# Patient Record
Sex: MALE | Race: WHITE | NOT HISPANIC OR LATINO | Employment: UNEMPLOYED | URBAN - METROPOLITAN AREA
[De-identification: names, ages, dates, MRNs, and addresses within clinical notes are randomized per-mention and may not be internally consistent; named-entity substitution may affect disease eponyms.]

---

## 2019-11-18 ENCOUNTER — HOSPITAL ENCOUNTER (EMERGENCY)
Facility: CLINIC | Age: 59
Discharge: HOME | End: 2019-11-18
Attending: PSYCHIATRY & NEUROLOGY | Admitting: PSYCHIATRY & NEUROLOGY
Payer: COMMERCIAL

## 2019-11-18 VITALS
HEIGHT: 67 IN | SYSTOLIC BLOOD PRESSURE: 155 MMHG | RESPIRATION RATE: 18 BRPM | WEIGHT: 140 LBS | DIASTOLIC BLOOD PRESSURE: 91 MMHG | TEMPERATURE: 98.3 F | HEART RATE: 85 BPM | BODY MASS INDEX: 21.97 KG/M2 | OXYGEN SATURATION: 96 %

## 2019-11-18 DIAGNOSIS — F10.20 UNCOMPLICATED ALCOHOL DEPENDENCE (H): ICD-10-CM

## 2019-11-18 DIAGNOSIS — F43.23 ADJUSTMENT REACTION WITH ANXIETY AND DEPRESSION: ICD-10-CM

## 2019-11-18 LAB
ALCOHOL BREATH TEST: 0.07 (ref 0–0.01)
AMPHETAMINES UR QL SCN: NEGATIVE
BARBITURATES UR QL: NEGATIVE
BENZODIAZ UR QL: NEGATIVE
CANNABINOIDS UR QL SCN: NEGATIVE
COCAINE UR QL: NEGATIVE
ETHANOL UR QL SCN: POSITIVE
OPIATES UR QL SCN: NEGATIVE

## 2019-11-18 PROCEDURE — 90791 PSYCH DIAGNOSTIC EVALUATION: CPT

## 2019-11-18 PROCEDURE — 80307 DRUG TEST PRSMV CHEM ANLYZR: CPT | Performed by: FAMILY MEDICINE

## 2019-11-18 PROCEDURE — 99285 EMERGENCY DEPT VISIT HI MDM: CPT | Mod: 25 | Performed by: PSYCHIATRY & NEUROLOGY

## 2019-11-18 PROCEDURE — 80320 DRUG SCREEN QUANTALCOHOLS: CPT | Performed by: FAMILY MEDICINE

## 2019-11-18 PROCEDURE — 99284 EMERGENCY DEPT VISIT MOD MDM: CPT | Mod: Z6 | Performed by: PSYCHIATRY & NEUROLOGY

## 2019-11-18 RX ORDER — ZOLPIDEM TARTRATE 10 MG/1
10 TABLET ORAL
Status: ON HOLD | COMMUNITY
End: 2019-11-26

## 2019-11-18 ASSESSMENT — ENCOUNTER SYMPTOMS
GASTROINTESTINAL NEGATIVE: 1
RESPIRATORY NEGATIVE: 1
DECREASED CONCENTRATION: 1
HALLUCINATIONS: 0
EYES NEGATIVE: 1
MUSCULOSKELETAL NEGATIVE: 1
NEUROLOGICAL NEGATIVE: 1
APPETITE CHANGE: 1
ACTIVITY CHANGE: 1
HYPERACTIVE: 0
CARDIOVASCULAR NEGATIVE: 1

## 2019-11-18 ASSESSMENT — MIFFLIN-ST. JEOR: SCORE: 1398.67

## 2019-11-18 NOTE — ED PROVIDER NOTES
"  History     Chief Complaint   Patient presents with     Drug / Alcohol Assessment     Suicidal     HPI  Bert Aburto is a 59 year old male who is here wanting a CD assessment and detox. Patient has a long history of alcohol dependence. He felt his drinking has gotten out of control past month. He lost his job this summer. His wife is concerned. He has been seen in the ED for falls due to his drinking. He was last seen at [facility] 2 days ago. He was not interested in [facility] detox. He allegedly has been calling [facility] for bed availability but none was available. He decided to come here to the ED. He reports drinking up to 15 beers daily. He breathalized 0.06 on arrival. He reports his wife told him to say that he is feeling suicidal with hope that he will be admitted. There was no detox bed. Patient is not interested in a psych bed as he denies feeling unsafe nor suicidal. He was told to say it in order to get admitted. He is not interested in [facility] detox. He is open to getting an outpatient CD assessment.     Patient denies using other drugs. He does not exhibit psychosis nor appear under the influence nor impaired by drugs. He now would like to go home. He would like to go smoke.    Please see [facility] Crisis Assessment on 11/18/19 in Epic for further details.    PERSONAL MEDICAL HISTORY  Past Medical History:   Diagnosis Date     Cancer (H)      PAST SURGICAL HISTORY  Past Surgical History:   Procedure Laterality Date     GENITOURINARY SURGERY       FAMILY HISTORY  History reviewed. No pertinent family history.  SOCIAL HISTORY  Social History     Tobacco Use     Smoking status: Current Every Day Smoker     Packs/day: 1.50     Smokeless tobacco: Never Used   Substance Use Topics     Alcohol use: Yes     Comment: \"12 beers a day\"     MEDICATIONS  No current facility-administered medications for this encounter.      Current Outpatient Medications   Medication     zolpidem (AMBIEN) 10 MG tablet     ALLERGIES  No Known " "Allergies      I have reviewed the Medications, Allergies, Past Medical and Surgical History, and Social History in the Epic system.    Review of Systems   Constitutional: Positive for activity change and appetite change.   HENT: Negative.    Eyes: Negative.    Respiratory: Negative.    Cardiovascular: Negative.    Gastrointestinal: Negative.    Genitourinary: Negative.    Musculoskeletal: Negative.    Neurological: Negative.    Psychiatric/Behavioral: Positive for decreased concentration and suicidal ideas. Negative for hallucinations. The patient is not hyperactive.    All other systems reviewed and are negative.      Physical Exam   BP: (!) 161/82  Pulse: 68  Temp: 98  F (36.7  C)  Resp: 18  Height: 170.2 cm (5' 7\")  Weight: 63.5 kg (140 lb)  SpO2: 100 %      Physical Exam  Vitals signs and nursing note reviewed.   HENT:      Head: Normocephalic.      Nose: Nose normal.      Mouth/Throat:      Mouth: Mucous membranes are moist.   Eyes:      Pupils: Pupils are equal, round, and reactive to light.   Neck:      Musculoskeletal: Normal range of motion.   Cardiovascular:      Rate and Rhythm: Normal rate and regular rhythm.   Pulmonary:      Effort: Pulmonary effort is normal.      Breath sounds: Normal breath sounds.   Abdominal:      General: Abdomen is flat.   Musculoskeletal: Normal range of motion.   Skin:     General: Skin is warm.   Neurological:      General: No focal deficit present.      Mental Status: He is alert.   Psychiatric:         Attention and Perception: Attention and perception normal.         Mood and Affect: Mood and affect normal.         Speech: Speech normal.         Behavior: Behavior normal. Behavior is not agitated, aggressive, hyperactive or combative. Behavior is cooperative.         Thought Content: Thought content normal. Thought content is not paranoid or delusional. Thought content does not include homicidal or suicidal ideation.         Cognition and Memory: Cognition and memory " normal.         Judgment: Judgment normal.         ED Course        Procedures               Labs Ordered and Resulted from Time of ED Arrival Up to the Time of Departure from the ED   DRUG ABUSE SCREEN 6 CHEM DEP URINE ([facility]) - Abnormal; Notable for the following components:       Result Value    Ethanol Qual Urine Positive (*)     All other components within normal limits   ALCOHOL BREATH TEST POCT - Abnormal; Notable for the following components:    Alcohol Breath Test 0.069 (*)     All other components within normal limits            Assessments & Plan (with Medical Decision Making)   Patient with alcohol dependence who is here seeking detox. He also mentioned feeling suicidal as he was told that he would be ensured an admission. There is no available bed. He is not interested in a psych bed. He would like discharge to home. He plans on taking cab. He took an referral for an outpatient CD evaluation set up by [facility]. Patient can be discharged.    I have reviewed the nursing notes.    I have reviewed the findings, diagnosis, plan and need for follow up with the patient.    New Prescriptions    No medications on file       Final diagnoses:   Uncomplicated alcohol dependence (H)   Adjustment reaction with anxiety and depression       11/18/2019   [facility], [place], EMERGENCY DEPARTMENT     Juaquin Alcaraz MD  11/18/19 [id number]

## 2019-11-18 NOTE — ED NOTES
Patient arrives to [facility]. Psych Associate explains process and gives patient urine cup. Patient told about meeting with Mental Health  and Psychiatrist. Patient told about 2-5 hour time frame for complete evaluation. Patient offered fluids, nutrition, and comfort measures. Patient told about continuous video observation in room.

## 2019-11-18 NOTE — DISCHARGE INSTRUCTIONS
Please work on sobriety. Consider attending AA meetings for support  Follow-up P-referred outpatient CD evaluation and recommendations  If you are unable to stop drinking and want detox here, please continue to call Intake ([phone]) for bed availability

## 2019-11-20 ENCOUNTER — HOSPITAL ENCOUNTER (OUTPATIENT)
Dept: BEHAVIORAL HEALTH | Facility: CLINIC | Age: 59
Discharge: HOME | End: 2019-11-20
Attending: SOCIAL WORKER | Admitting: SOCIAL WORKER
Payer: COMMERCIAL

## 2019-11-20 VITALS
SYSTOLIC BLOOD PRESSURE: 134 MMHG | HEIGHT: 67 IN | DIASTOLIC BLOOD PRESSURE: 76 MMHG | WEIGHT: 140 LBS | HEART RATE: 85 BPM | BODY MASS INDEX: 21.97 KG/M2

## 2019-11-20 PROCEDURE — H0001 ALCOHOL AND/OR DRUG ASSESS: HCPCS | Mod: HF | Performed by: COUNSELOR

## 2019-11-20 ASSESSMENT — ANXIETY QUESTIONNAIRES
6. BECOMING EASILY ANNOYED OR IRRITABLE: NEARLY EVERY DAY
7. FEELING AFRAID AS IF SOMETHING AWFUL MIGHT HAPPEN: NEARLY EVERY DAY
2. NOT BEING ABLE TO STOP OR CONTROL WORRYING: NEARLY EVERY DAY
3. WORRYING TOO MUCH ABOUT DIFFERENT THINGS: NEARLY EVERY DAY
1. FEELING NERVOUS, ANXIOUS, OR ON EDGE: MORE THAN HALF THE DAYS
GAD7 TOTAL SCORE: 20
IF YOU CHECKED OFF ANY PROBLEMS ON THIS QUESTIONNAIRE, HOW DIFFICULT HAVE THESE PROBLEMS MADE IT FOR YOU TO DO YOUR WORK, TAKE CARE OF THINGS AT HOME, OR GET ALONG WITH OTHER PEOPLE: EXTREMELY DIFFICULT
4. TROUBLE RELAXING: NEARLY EVERY DAY
5. BEING SO RESTLESS THAT IT IS HARD TO SIT STILL: NEARLY EVERY DAY

## 2019-11-20 ASSESSMENT — MIFFLIN-ST. JEOR: SCORE: 1398.67

## 2019-11-20 ASSESSMENT — PAIN SCALES - GENERAL: PAINLEVEL: NO PAIN (0)

## 2019-11-20 ASSESSMENT — PATIENT HEALTH QUESTIONNAIRE - PHQ9: SUM OF ALL RESPONSES TO PHQ QUESTIONS 1-9: 21

## 2019-11-20 NOTE — PROGRESS NOTES
"Rule 25 Assessment  Background Information   1. Date of Assessment Request  2. Date of Assessment  11/20/2019 3. Date Service Authorized     4.   TIGRE Grajeda     5.  Phone Number   [phone] 6. Referent  [facility] ED 7. Assessment Site  [facility]     8. Client Name   Bert Aburto 9. Date of Birth  1960 Age  59 year old 10. Gender  male  11. PMI/ Insurance No.  [id number]   12. Client's Primary Language:  English 13. Do you require special accommodations, such as an  or assistance with written material? No   14. Current Address: [address] [place] [address]   15. Client Phone Numbers: [phone] (home) or [phone] (cell)     16. Tell me what has happened to bring you here today.    Mr. Bert Aburto presents to [facility] for an outpatient evaluation of possible chemical dependency. The reason for the CD evaluation was due to the patient stating, \"I have a reallyt bad drinking problem and I haven't been functioning the last few months. If I am awake I am drinking. And I can't eat.\"     17. Have you had other rule 25 assessments?     No    DIMENSION I - Acute Intoxication /Withdrawal Potential   1. Chemical use most recent 12 months outside a facility and other significant use history (client self-report)              X = Primary Drug Used   Age of First Use Most Recent Pattern of Use and Duration   Need enough information to show pattern (both frequency and amounts) and to show tolerance for each chemical that has a diagnosis   Date of last use and time, if needed   Withdrawal Potential? Requiring special care Method of use  (oral, smoked, snort, IV, etc)   x   Alcohol     teens HU: past 6 months.  In a 24 hour period, drinking at least 12 Leinekugel beers. He'll drink [employer] [place] Cream before he goes to sleep.    Prior to past 6 months: drinking about 6 beers a day.   11/20/19  11am  CCEILY @ 12:30pm " was 0.50 yes oral      Marijuana/  Hashish   teens HU: none  Used in high school and college. 1980 no smoke      Cocaine/Crack     No use          Meth/  Amphetamines   No use          Heroin     No use          Other Opiates/  Synthetics   '18 7/17/2018: surgery and rx oxycotin. He used as prescribed. 7/2018 no oral      Inhalants     No use          Benzodiazepines     No use          Hallucinogens     '90 Mushrooms: Used 10x in his life. '90 no oral      Barbiturates/  Sedatives/  Hypnotics '10 Ambien:  First Rx: 2010  Currently: when he tries to sleep, he takes 10-20mg. He suppose to take one 10mg tab.   11/20/19  This am he took  10mg no oral      Over-the-Counter Drugs   No use          Other     No use          Nicotine     teen Current: 1+ PPD 11/20/19 no smoke     2. Do you use greater amounts of alcohol/other drugs to feel intoxicated or achieve the desired effect?  Yes.  Or use the same amount and get less of an effect?  Yes.  Example: The patient reported having increased use and tolerance issues with alcohol.    3A. Have you ever been to detox?     No    3B. When was the first time?     The patient denied ever having a detoxification admission.    3C. How many times since then?     The patient denied ever having a detoxification admission.    3D. Date of most recent detox:     The patient denied ever having a detoxification admission.    4.  Withdrawal symptoms: Have you had any of the following withdrawal symptoms?  Past 12 months Recent (past 30 days)   Agitation  Headache  Fatigue / Extremely Tired  Vivid / Unpleasant Dreams  Irritability  Sensitivity to Noise  Diminished Appetite  Unable to Eat Agitation  Headache  Fatigue / Extremely Tired  Vivid / Unpleasant Dreams  Irritability  Sensitivity to Noise  Diminished Appetite  Unable to Eat     's Visual Observations and Symptoms: currently intoxicated. CECILY at 12:30pm was 0.050    Based on the above information, is withdrawal likely to require  attention as part of treatment participation?  Yes    Dimension I Ratings   Acute intoxication/Withdrawal potential - The placing authority must use the criteria in Dimension I to determine a client s acute intoxication and withdrawal potential.    RISK DESCRIPTIONS - Severity ratin Client can tolerate and cope with withdrawal discomfort. The client displays mild to moderate intoxication or signs and symptoms interfering with daily functioning but does not immediately endanger self or others. Client poses minimal risk of severe withdrawal.    REASONS SEVERITY WAS ASSIGNED (What about the amount of the person s use and date of most recent use and history of withdrawal problems suggests the potential of withdrawal symptoms requiring professional assistance? )     Patient reports that his last use of alcohol was today about 11am. Patient displays mild intoxication symptomology at this time.  Pt was given a breathalyzer during his evaluation and patient's CECILY was 0.05 at 12:30pm. We called [facility] detox, and no beds were available today. Pt will need detox prior to entering a residential CD treatment program.         DIMENSION II - Biomedical Complications and Conditions   1a. Do you have any current health/medical conditions?(Include any infectious diseases, allergies, or chronic or acute pain, history of chronic conditions)       Yes.   Illnesses/Medical Conditions you are receiving care for: Chronic back pain. Hx of prostrate cancer in 2018.    1b. On a scale of mild, moderate to severe please specify the severity of the patient's diabetes and/or neuropathy.    The patient denied having a history of being diagnosed with diabetes or neuropathy.    2. Do you have a health care provider? When was your most recent appointment? What concerns were identified?     The patient's PCP is Dr. Chela Brambila.  The patient's Primary Medical Clinic is [facility].  The patient's last medical appointment was ~ 1-2  week agos.  Pt was seen in the ED at  [facility] on 2019 for alcohol abuse.    3. If indicated by answers to items 1 or 2: How do you deal with these concerns? Is that working for you? If you are not receiving care for this problem, why not?      Back pain: he ignores it and he drinks to treat it.    4A. List current medication(s) including over-the-counter or herbal supplements--including pain management:     Ambien and zoloft    4B. Do you follow current medical recommendations/take medications as prescribed?     No, please explain: He takes more Ambien than prescribed.    4C. When did you last take your medication?     Zoloft: this past weekend  Ambien: this morning    4D. Do you need a referral to have a follow up with a primary care physician?    No.    5. Has a health care provider/healer ever recommended that you reduce or quit alcohol/drug use?     Yes    6. Are you pregnant?     NA, because the patient is male    7. Have you had any injuries, assaults/violence towards you, accidents, health related issues, overdose(s) or hospitalizations related to your use of alcohol or other drugs:     Yes, explain: 2018 he was walking the dog, fell, and got a concussion. ED visit on 2019 for alcohol intoxication/withdrawal/abuse.    8. Do you have any specific physical needs/accommodations? No    Dimension II Ratings   Biomedical Conditions and Complications - The placing authority must use the criteria in Dimension II to determine a client s biomedical conditions and complications.   RISK DESCRIPTIONS - Severity ratin Client tolerates and sherrie with physical discomfort and is able to get the services that the client needs.    REASONS SEVERITY WAS ASSIGNED (What physical/medical problems does this person have that would inhibit his or her ability to participate in treatment? What issues does he or she have that require assistance to address?)    Patient denies having any chronic  "biomedical conditions that would interfere with treatment or any recovery skills training/workshop. Pt reports having chronic back pain. Pt denies taking any medications for his physical health. At the time of the CD evaluation the patient's BP was 134/76 and Pulse was 85 BPM. Pt's BMI score was 21.93, placing him in the healthy weight category. Pt reports having pain at this time and reports his pain level is a 1 on the 0-10 Pain Rating Scale. Pt reports that he is a daily cigarette smoker and is not inclined to quit smoking at this time.          DIMENSION III - Emotional, Behavioral, Cognitive Conditions and Complications   1. (Optional) Tell me what it was like growing up in your family. (substance use, mental health, discipline, abuse, support)     \"big family, crowded. It was good. I enjoyed it.\" He reports there is 8 kids in the family and he is the 2nd youngest.  CD: oldest sister and brother are on lithium for bipolar. Mom was too before she passed away.  Support: yes  Abuse: no    2. When was the last time that you had significant problems...  A. with feeling very trapped, lonely, sad, blue, depressed or hopeless  about the future? Past Month- \"because of my drinking.\"    B. with sleep trouble, such as bad dreams, sleeping restlessly, or falling  asleep during the day? Past Month- related to his drinking.    C. with feeling very anxious, nervous, tense, scared, panicked, or like  something bad was going to happen? Past Month- he if often anxious.    D. with becoming very distressed and upset when something reminded  you of the past? Never    E. with thinking about ending your life or committing suicide? Past Month- \"I just feel like my life is going no where and I feel I would be better off dead. Especially the way it is now.\" Any actual thoughts of suicide? \"not how it would happen.\" Any plan? no.  Passive suicide ideation.    3. When was the last time that you did the following things two or more " "times?  A. Lied or conned to get things you wanted or to avoid having to do  something? Past Month    B. Had a hard time paying attention at school, work, or home? 2 - 12 months ago    C. Had a hard time listening to instructions at school, work, or home? Never    D. Were a bully or threatened other people? Never    E. Started physical fights with other people? Never    Note: These questions are from the Global Appraisal of Individual Needs--Short Screener. Any item marked  past month  or  2 to 12 months ago  will be scored with a severity rating of at least 2.     For each item that has occurred in the past month or past year ask follow up questions to determine how often the person has felt this way or has the behavior occurred? How recently? How has it affected their daily living? And, whether they were using or in withdrawal at the time?    See above    4A. If the person has answered item 2E with  in the past year  or  the past month , ask about frequency and history of suicide in the family or someone close and whether they were under the influence.     The patient denied any family member or someone close to the patient had ever completed suicide.    Any history of suicide in your family? Or someone close to you?     The patient denied any family member or someone close to the patient had ever completed suicide.    4B. If the person answered item 2E  in the past month  ask about  intent, plan, means and access and any other follow-up information  to determine imminent risk. Document any actions taken to intervene  on any identified imminent risk.      Past Month- \"I just feel like my life is going no where and I feel I would be better off dead. Especially the way it is now.\" Any actual thoughts of suicide? \"not how it would happen.\" Any plan? no.  Passive suicide ideation.    5A. Have you ever been diagnosed with a mental health problem?     No      5B. Are you receiving care for any mental health issues? If " "yes, what is the focus of that care or treatment?  Are you satisfied with the service? Most recent appointment?  How has it been helpful?     Yes, He has a therapist and they have worked together since this past summer.  Last saw her \"a few months ago because I can't afford it.\"    6. Have you been prescribed medications for emotional/psychological problems?     Yes, Ambien and zoloft    7. Does your MH provider know about your use?     Yes.  7B. What does he or she have to say about it?(DSM) \"the same thing, quit. You shouldn't be doing what you are doing.\"    8A. Have you ever been verbally, emotionally, physically or sexually abused?      No     Follow up questions to learn current risk, continuing emotional impact.      The patient denied having any history of being verbally, emotionally, physically or sexually abused.    8B. Have you received counseling for abuse?      The patient denied having any history of being verbally, emotionally, physically or sexually abused.    9. Have you ever experienced or been part of a group that experienced community violence, historical trauma, rape or assault?     No    10A. :    No    11. Do you have problems with any of the following things in your daily life?    Dizziness, Performing your job/school work and In relationships with others      Note: If the person has any of the above problems, follow up with items 12, 13, and 14. If none of the issues in item 11 are a problem for the person, skip to item 15.    The patient would benefit from developing sober coping skills.    12. Have you been diagnosed with traumatic brain injury or Alzheimer s?  No    13. If the answer to #12 is no, ask the following questions:    Have you ever hit your head or been hit on the head? Yes- 2018 concussion    Were you ever seen in the Emergency Room, hospital or by a doctor because of an injury to your head? Yes    Have you had any significant illness that affected your brain (brain " "tumor, meningitis, West Nile Virus, stroke or seizure, heart attack, near drowning or near suffocation)? No    14. If the answer to #12 is yes, ask if any of the problems identified in #11 occurred since the head injury or loss of oxygen. Yes, \"I was unconscious, so yeah.\"    15A. Highest grade of school completed:     Graduate/professional degree- environmental engineering     15B. Do you have a learning disability? No    15C. Did you ever have tutoring in Math or English? No    15D. Have you ever been diagnosed with Fetal Alcohol Effects or Fetal Alcohol Syndrome? No    16. If yes to item 15 B, C, or D: How has this affected your use or been affected by your use?     The patient denied having any history of a learning disability, tutoring in math or English or being diagnosed with Fetal Alcohol Effects or Fetal Alcohol Syndrome.    Dimension III Ratings   Emotional/Behavioral/Cognitive - The placing authority must use the criteria in Dimension III to determine a client s emotional, behavioral, and cognitive conditions and complications.   RISK DESCRIPTIONS - Severity ratin Client has difficulty with impulse control and lacks coping skills. Client has thoughts of suicide or harm to others without means; however, the thoughts may interfere with participation in some treatment activities. Client has difficulty functioning in significant life areas. Client has moderate symptoms of emotional, behavioral, or cognitive problems. Client is able to participate in most treatment activities.    REASONS SEVERITY WAS ASSIGNED - What current issues might with thinking, feelings or behavior pose barriers to participation in a treatment program? What coping skills or other assets does the person have to offset those issues? Are these problems that can be initially accommodated by a treatment provider? If not, what specialized skills or attributes must a provider have?    The patient denies having mental health diagnosis. Pt " "is taking Ambien more than he is prescribed and doesn't take his Zoloft consistently. Pt reports he has worked with a therapist in 2019, but hasn't seen her in a while since he couldn't afford to see her anymore. Pt denies a history of abuse. At the time of the assessment, pt's PHQ-9 score was 21 (severe depression) and his SCOTT-7 score was 20 (severe anxiety).  Pt lacks emotional and stress management skills. Pt denies SIB, SA, suicidal thoughts at this time. During pt's assessment, his [place]-Suicide Severity Rating Scale score was 1 - Low Risk.         DIMENSION IV - Readiness for Change   1. You ve told me what brought you here today. (first section) What do you think the problem really is?     The drinking and not being able to stop.     2. Tell me how things are going. Ask enough questions to determine whether the person has use related problems or assets that can be built upon in the following areas: Family/friends/relationships; Legal; Financial; Emotional; Educational; Recreational/ leisure; Vocational/employment; Living arrangements (DSM)      The patient currently lives with his wife of 30 years.  The patient denied having any concerns regarding his immediate living environment or neighborhood. The patient reported having a relationship conflict with \"my wife, my daughter, and just about anybody that knows me, mainly through my wife\" due to his ongoing alcohol abuse issues. The patient reported having a history of legal issues, including 3 DUIs, the most recent was in 2008. The patient is unemployed and he last worked 6/10/2019. The patient reported having some increased financial stress due to not working. The patient lacks a current sober peer support network.    3. What activities have you engaged in when using alcohol/other drugs that could be hazardous to you or others (i.e. driving a car/motorcycle/boat, operating machinery, unsafe sex, sharing needles for drugs or tattoos, etc     The patient " "reported having a history of driving while under the influence of alcohol or drugs.    4. How much time do you spend getting, using or getting over using alcohol or drugs? (DSM)     He drinks whenever he is awake. He will drink at least 1 beer an hour.    5. Reasons for drinking/drug use (Use the space below to record answers. It may not be necessary to ask each item.)  Like the feeling Yes   Trying to forget problems Yes   To cope with stress Yes   To relieve physical pain Yes   To cope with anxiety Yes   To cope with depression Yes   To relax or unwind Yes   Makes it easier to talk with people No   Partner encourages use No   Most friends drink or use Yes   To cope with family problems Yes   Afraid of withdrawal symptoms/to feel better No   Other (specify)  No     A. What concerns other people about your alcohol or drug use/Has anyone told you that you use too much? What did they say? (DSM)     Wife: \"because I am basically worthless.\"    B. When did you first think you had a problem with drugs or alcohol?    \"ever since I was alive.\"    6. What changes are you willing to make? What substance are you willing to stop using? How are you going to do that? Have you tried that before? What interfered with your success with that goal?      What specific changes are you willing to make? \"quit drinking. Try to anyways.\"  Why do you want to make this change? \"because it is time.\"  What are you willing to commit to in order to make this change? \"check in (to detox), get better, come home hopefully, and stop.\"  What is getting in your way to make this change? Nothing. \"Just this process\" of getting into CD treatment.  What is the cost if you don't make this change? \"my marriage. My house.\"  On a scale 0-10, how confident are you in making this change? 8  Why are you a 8 and not a 6? \"because I am not sure what it really means.\"    7. What would be helpful to you in making this change?     \"AA for one. Getting alcohol out of " "my system.\" He wants to attend detox.    Dimension IV Ratings   Readiness for Change - The placing authority must use the criteria in Dimension IV to determine a client s readiness for change.   RISK DESCRIPTIONS - Severity ratin Client displays verbal compliance, but lacks consistent behaviors; has low motivation for change; and is passively involved in treatment.    REASONS SEVERITY WAS ASSIGNED - (What information did the person provide that supports your assessment of his or her readiness to change? How aware is the person of problems caused by continued use? How willing is she or he to make changes? What does the person feel would be helpful? What has the person been able to do without help?)      Patient admits he has had a problem with alcohol \"ever since I was alive.\"  The patient's motivation for CD treatment appears to external, as an attempt to save his marriage.  Pt drank an hour prior to this CD evaluation. Pt is willing to go to detox followed by inpatient CD treatment.  Pt appears to be in the \"contemplation\" Stage within the Stages of Change Model.          DIMENSION V - Relapse, Continued Use, and Continued Problem Potential   1A. In what ways have you tried to control, cut-down or quit your use? If you have had periods of sobriety, how did you accomplish that? What was helpful? What happened to prevent you from continuing your sobriety? (DSM)     Control: He hasn't tried to control his use of alcohol.  Sober time: when his daughter was born.    1B. What were the circumstances of your most recent relapse with mood altering chemicals?    none    2. Have you experienced cravings? If yes, ask follow up questions to determine if the person recognizes triggers and if the person has had any success in dealing with them.     In the past 30 days, on a scale of 0-10 (0 least severe to 10 being most severe, how would you rate your cravings?  10    3. Have you been treated for alcohol/other drug " "abuse/dependence? No    4. Support group participation: Have you/do you attend support group meetings to reduce/stop your alcohol/drug use? How recently? What was your experience? Are you willing to restart? If the person has not participated, is he or she willing?     \"I have been to AA once.\"    5. What would assist you in staying sober/straight?     \"AA for one. Getting alcohol out of my system.\" He wants to attend detox.    Dimension V Ratings   Relapse/Continued Use/Continued problem potential - The placing authority must use the criteria in Dimension V to determine a client s relapse, continued use, and continued problem potential.   RISK DESCRIPTIONS - Severity ratin No awareness of the negative impact of mental health problems or substance abuse. No coping skills to arrest mental health or addiction illnesses, or prevent relapse.    REASONS SEVERITY WAS ASSIGNED - (What information did the person provide that indicates his or her understanding of relapse issues? What about the person s experience indicates how prone he or she is to relapse? What coping skills does the person have that decrease relapse potential?)      Pt denies having ever attended a cd treatment program, a detox program, or a 12 step meeting before. Pt lacks education into his disease of addiction. In the past 30 days, pt rates his cravings for alcohol as a 10 on the 0-10 Craving Scale.  Pt lacks impulse control and long-term sober maintenance skills. Pt lacks insight into the effects his use has had on his physical and mental health.  Pt is at a high risk for continued use.         DIMENSION VI - Recovery Environment   1. Are you employed/attending school? Tell me about that.     Pt is no longer working. His last day of work was 6/10/2019.  How has your use impacted your job? He reports it hasn't.  When is the last time you called in to work due to being too hungover/impaired to go to work? He hasn't    2A. Describe a typical day; " "evening for you. Work, school, social, leisure, volunteer, spiritual practices. Include time spent obtaining, using, recovering from drugs or alcohol. (DSM)     Current: \"I get up around, various times. I might wake up... I go to bed kind of early. I might up at 2, I might wake up at 8. Every time I get up I have a drink. My wife gets up around 10 or 11, and she immediately calls me and it goes down hill from there.\"    Please describe what leisure activities have been associated with your substance abuse:     The patient denied having any leisure activities which had been associated with his substance abuse.    2B. How often do you spend more time than you planned using or use more than you planned? (DSM)     \"every day.\"    3. How important is using to your social connections? Do many of your family or friends use?     Most of his friends drink.    4A. Are you currently in a significant relationship?     Yes.  4B. How long?  30 years            Please describe your significant other's use of mood altering chemicals? She drinks when she comes home from work.    4C. Sexual Orientation:     Heterosexual    5A. Who do you live with?      His wife    5B. Tell me about their alcohol/drug use and mental health issues.     She drinks when she comes home from work to relax.    5C. Are you concerned for your safety there? No    5D. Are you concerned about the safety of anyone else who lives with you? No    6A. Do you have children who live with you?     No    6B. Do you have children who do not live with you?     Yes- one adult daughter.    7A. Who supports you in making changes in your alcohol or drug use? What are they willing to do to support you? Who is upset or angry about you making changes in your alcohol or drug use? How big a problem is this for you?      \"wife and daughter.\"    7B. This table is provided to record information about the person s relationships and available support It is not necessary to ask " each item; only to get a comprehensive picture of their support system.  How often can you count on the following people when you need someone?   Partner / Spouse Always supportive   Parent(s)/Aunt(s)/Uncle(s)/Grandparents The patient's parents and other family members are .   Sibling(s)/Cousin(s) Usually supportive   Child(negro) Always supportive   Other relative(s) Usually supportive   Friend(s)/neighbor(s) Usually supportive  The patient doesn't have any other current friends.   Child(negro) s father(s)/mother(s) Always supportive   Support group member(s) The patient denied having any current involvement with 12-step or other support group meetings.   Community of campbell members The patient denied having any current involvement with community campbell members.   /counselor/therapist/healer Always supportive   Other (specify) No     8A. What is your current living situation?     He owns a house with his wife.    8B. What is your long term plan for where you will be living?     No changes. He is talking about selling his house.     8C. Tell me about your living environment/neighborhood? Ask enough follow up questions to determine safety, criminal activity, availability of alcohol and drugs, supportive or antagonistic to the person making changes.      No concerns.    9. Criminal justice history: Gather current/recent history and any significant history related to substance use--Arrests? Convictions? Circumstances? Alcohol or drug involvement? Sentences? Still on probation or parole? Expectations of the court? Current court order? Any sex offenses - lifetime? What level? (DSM)    3 DUIs. Most recent one was in .    10. What obstacles exist to participating in treatment? (Time off work, childcare, funding, transportation, pending snf time, living situation)     The patient denied having any obstacles for participating in substance abuse treatment.    Dimension VI Ratings   Recovery environment -  "The placing authority must use the criteria in Dimension VI to determine a client s recovery environment.   RISK DESCRIPTIONS - Severity rating: 3 Client is not engaged in structured, meaningful activity and the client's peers, family, significant other, and living environment are unsupportive, or there is significant criminal justice system involvement.    REASONS SEVERITY WAS ASSIGNED - (What support does the person have for making changes? What structure/stability does the person have in his or her daily life that will increase the likelihood that changes can be sustained? What problems exist in the person s environment that will jeopardize getting/staying clean and sober?)     The patient currently lives with his wife of 30 years.  The patient denied having any concerns regarding his immediate living environment or neighborhood. The patient reported having a relationship conflict with \"my wife, my daughter, and just about anybody that knows me, mainly through my wife\" due to his ongoing alcohol abuse issues. The patient reported having a history of legal issues, including 3 DUIs, the most recent was in 2008. The patient is unemployed and he last worked 6/10/2019. The patient reported having some increased financial stress due to not working. The patient lacks a current sober peer support network.         Client Choice/Exceptions   Would you like services specific to language, age, gender, culture, Mormon preference, race, ethnicity, sexual orientation or disability?  No    What particular treatment choices and options would you like to have? inpatient    Do you have a preference for a particular treatment program? None    Criteria for Diagnosis     Criteria for Diagnosis  DSM-5 Criteria for Substance Use Disorder  Instructions: Determine whether the client currently meets the criteria for Substance Use Disorder using the diagnostic criteria in the DSM-V pp.[phone]. Current means during the most recent 12 " months outside a facility that controls access to substances    Category of Substance Severity (ICD-10 Code / DSM 5 Code)     Alcohol Use Disorder Severe  (10.20) (303.90)   Cannabis Use Disorder The patient does not meet the criteria for a Cannabis use disorder.   Hallucinogen Use Disorder The patient does not meet the criteria for a Hallucinogen use disorder.   Inhalant Use Disorder The patient does not meet the criteria for an Inhalant use disorder.   Opioid Use Disorder The patient does not meet the criteria for an Opioid use disorder.   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   Tobacco Use Disorder Moderate   (F17.200) (305.1)   Other (or unknown) Substance Use Disorder The patient does not meet the criteria for a Other (or unknown) Substance use disorder.       Collateral Contact Summary   Number of contacts made: 2    Contact with referring person:  No    If court related records were reviewed, summarize here: No court records had been reviewed at the time of this documentation.    Information from collateral contacts supported/largely agreed with information from the client and associated risk ratings.      Rule 25 Assessment Summary and Plan   's Recommendation    1)  Complete a medical detox program or a similar detox program followed by a residential treatment program. Some examples are: [facility], [facility], [facility], or [facility].   2)  Abstain from all mood-altering chemicals unless prescribed by a licensed provider.   3)  Attend, at minimum, 2 weekly support group meetings, such as 12 step based (AA/NA), SMART Recovery, [facility], and/or [facility] meetings.     4)  Actively work with a male mentor/sponsor on a weekly basis.   5)  Follow all the recommendations of your treatment/medical  providers.  6)  Begin working with your therapist again.      Collateral Contacts     Name:    [id number]   Relationship:    [facility]   Phone Number:    NA Releases:    NA     The patient's medical record at [facility] was reviewed and the information contained in the medical record supported the patient's account of his chemical use history and chemical use consequences.    Per ED note on 11/18/2019 by Dr. Alcaraz:  Chief Complaint   Patient presents with     Drug / Alcohol Assessment     Suicidal      HPI  Bert A Deady is a 59 year old male who is here wanting a CD assessment and detox. Patient has a long history of alcohol dependence. He felt his drinking has gotten out of control past month. He lost his job this summer. His wife is concerned. He has been seen in the ED for falls due to his drinking. He was last seen at [facility] 2 days ago. He was not interested in [facility] detox. He allegedly has been calling [facility] for bed availability but none was available. He decided to come here to the ED. He reports drinking up to 15 beers daily. He breathalized 0.06 on arrival. He reports his wife told him to say that he is feeling suicidal with hope that he will be admitted. There was no detox bed. Patient is not interested in a psych bed as he denies feeling unsafe nor suicidal. He was told to say it in order to get admitted. He is not interested in [facility] detox. He is open to getting an outpatient CD assessment.      Patient denies using other drugs. He does not exhibit psychosis nor appear under the influence nor impaired by drugs. He now would like to go home. He would like to go smoke.    Per Teresa Church on 6/12/2008:  HISTORY OF ALCOHOL AND DRUG USE:  He began using alcohol at age 14.  His heaviest use was in his college years, he was using 3-4 times per week, 3-4 beers.  In the last 6 months he quit drinking after he received a DWI.  His last reported use was 11/16/2007 or 11/17/2007.  He smoked  marijuana at  age 15.  His heaviest use was in high school and early college.  He was smoking 2-3 times per week.  He has not used since the 1980s.  He used cocaine at age 20 1-2 times.  He used hallucinogens at age 20.  He has not done that since college.  He started smoking cigarettes at age 15 and he   continues to smoke.  SUMMARY OF CHEMICAL DEPENDENCY SYMPTOMS:  He does not think he has a problem with chemicals.  He used to drink beer.  He has reduced his use within the last 6 months.  He has had an increase in tolerance and needed more to get the same high.  He has attempted to stop his use with the aid of AA.  He has changed his pattern of use.  He uses 1-2 hours a day when he does drink.  He admits to having had hangovers.  He failed to meet parental responsibilities and other responsibilities at home.  He has had 2   DWIs, one in 2004 and one in 2007.  He spent more money than he could afford.  He embarrassed and emotionally hurt his family.  He was confronted about his using behavior.  He currently lives with his wife and child.    Collateral Contacts     Name:    Corina Brenner   Relationship:    Wife   Phone Number:    [phone] cell  [phone] work   Releases:    Yes     Corina reports pt's drinking is getting worse. She reports he drinks 18-20 beers per day, whenever he is awake. He has drank almost daily for 40+ years. He has 4 DUIs and has never thought he had a problem with alcohol before. She stated she took pt's car keeps so he doesn't drive. He uses 4 tabs of Ambien a day, along with alcohol, to help him sleep. She stated pt spent a lot of time in bed.  He is very depressed and has low energy. She is worried that pt will complete suicide if things don't improve. Pt isn't eating and when he does eat, he throws up. He is unable to keep food down. She is concerned about brain damage caused by pt's drinking and not eating. She stated this is the most willing pt has ever been about not  drinking before.   ollateral Contacts      A problematic pattern of alcohol/drug use leading to clinically significant impairment or distress, as manifested by at least two of the following, occurring within a 12-month period:    1.) Alcohol/drug is often taken in larger amounts or over a longer period than was intended.  2.) There is a persistent desire or unsuccessful efforts to cut down or control alcohol/drug use  3.) A great deal of time is spent in activities necessary to obtain alcohol, use alcohol, or recover from its effects.  4.) Craving, or a strong desire or urge to use alcohol/drug  5.) Recurrent alcohol/drug use resulting in a failure to fulfill major role obligations at work, school or home.  6.) Continued alcohol use despite having persistent or recurrent social or interpersonal problems caused or exacerbated by the effects of alcohol/drug.  7.) Important social, occupational, or recreational activities are given up or reduced because of alcohol/drug use.  8.) Recurrent alcohol/drug use in situations in which it is physically hazardous.  9.) Alcohol/drug use is continued despite knowledge of having a persistent or recurrent physical or psychological problem that is likely to have been caused or exacerbated by alcohol.  10.) Tolerance, as defined by either of the following: A need for markedly increased amounts of alcohol/drug to achieve intoxication or desired effect.  11.) Withdrawal, as manifested by either of the following: The characteristic withdrawal syndrome for alcohol/drug (refer to Criteria A and B of the criteria set for alcohol/drug withdrawal).      Specify if: In early remission:  After full criteria for alcohol/drug use disorder were previously met, none of the criteria for alcohol/drug use disorder have been met for at least 3 months but for less than 12 months (with the exception that Criterion A4,  Craving or a strong desire or urge to use alcohol/drug  may be met).     In sustained  remission:   After full criteria for alcohol use disorder were previously met, non of the criteria for alcohol/drug use disorder have been met at any time during a period of 12 months or longer (with the exception that Criterion A4,  Craving or strong desire or urge to use alcohol/drug  may be met).   Specify if:   This additional specifier is used if the individual is in an environment where access to alcohol is restricted.    Mild: Presence of 2-3 symptoms  Moderate: Presence of 4-5 symptoms  Severe: Presence of 6 or more symptoms

## 2019-11-20 NOTE — PROGRESS NOTES
"[facility]  [address] [place] [address]        ADULT CD ASSESSMENT ADDENDUM      Patient Name: Bert Aburto  Cell Phone: [phone]  Home: [phone]   Email:  [email or web address]  Emergency Contact: Corina Brenner (wife) Tel: [phone]    The patient reported being:      With which race do you identify? White    Initial Screening Questions     1. Are you currently having severe withdrawal symptoms that are putting yourself or others in danger?  No    2. Are you currently having severe medical problems that require immediate attention?  No    3. Are you currently having severe emotional or behavioral problems that are putting yourself or others at risk of harm?  No    4. Do you have sufficient reading skills that will enable you to understand written materials, including the program rules and client rights materials?  Yes     Family History and other additional information     Who raised you? (parents, grandparents, adoptive parents, step-parents, etc.)    Both Parents    Please tell me what it was like growing up in your family. (please include any history of substance abuse, mental health issues, emotional/physical/sexual abuse, forms of discipline, and support)     \"big family, crowded. It was good. I enjoyed it.\" He reports there is 8 kids in the family and he is the 2nd youngest.  CD: oldest sister and brother are on lithium for bipolar. Mom was too before she passed away.  Support: yes  Abuse: no     Do you have any children or Stepchildren? Yes, explain: 1 26 y/o daughter    Are you being investigated by Child Protection Services? No    Do you have a child protection worker, probation office or ?  No    How would you describe your current finances?  Some money problems    If you are having problems, (unpaid bills, bankruptcy, IRS problems) please explain:  No    If working or a student are you able to function appropriately in that setting? No, explain: due " "to his drinking.     Describe your preferred learning style:  by reading    What are your some of your personal strengths?  \"I used to be pretty smart in engineering.\"    Do you currently participate in community campbell activities, such as attending Mosque, temple, Alevism or Baptist services?  No    How does your spirituality impact your recovery?  \"it doesn't.\"    Do you currently self-administer your medications?  Yes    Have you ever had to lie to people important to you about how much you smith?   No   Have you ever felt the need to bet more and more money?   No   Have you ever attempted treatment for a gambling problem?   No   Have you ever touched or fondled someone else inappropriately or forced them to have sex with you against their will?   No   Are you or have you ever been a registered sex offender?   No   Is there any history of sexual abuse in your family? No   Have you ever felt obsessed by your sexual behavior, such as having sex with many partners, masturbating often, using pornography often?   No     Have you ever received therapy or stayed in the hospital for mental health problems?   Yes, explain: currently in therapy. No MH hospitalizations.     Have you ever hurt yourself, such as cutting, burning or hitting yourself?   No     Have you ever purged, binged or restricted yourself as a way to control your weight?   No     Are you on a special diet?   No     Do you have any concerns regarding your nutritional status?   No     Have you had any appetite changes in the last 3 months?   No   Have you had weight loss or weight gain of more than 10 lbs in the last 3 months?   If patient gained or lost more than 10 lbs, then refer to program RN / attending Physician for assessment.   Yes, explain: lost weight   Was the patient informed of BMI?    Normal, No Intervention   Yes   Have you engaged in any risk-taking behavior that would put you at risk for exposure to blood-borne or sexually transmitted " diseases?   No   Do you have any dental problems?   No   Have you ever lived through any trauma or stressful life events?   No   In the past month, have you had any of the following symptoms related to the trauma listed above? (dreams, intense memories, flashbacks, physical reactions, etc.)   No   Have you ever believed people were spying on you, or that someone was plotting against you or trying to hurt you?   No   Have you ever believed someone was reading your mind or could hear your thoughts or that you could actually read someone's mind or hear what another person was thinking?   No   Have you ever believed that someone of some force outside of yourself was putting thoughts into your mind or made you act in a way that was not your usual self?  Have you ever though you were possessed?   No   Have you ever believed you were being sent special messages through the TV, radio or newspaper?   No   Have you ever heard things other people couldn't hear, such as voices or other noises?   No   Have you ever had visions when you were awake?  Or have you ever seen things other people couldn't see?   No   Do you have a valid 's license?    Yes     PHQ-9, SCOTT-7 and Suicide Risk Assessment   PHQ-9 on 11/20/2019 SCOTT-7 on 11/20/2019   The patient's PHQ-9 score was 21 out of 27, indicating severe depression.   The patient's SCOTT-7 score was 20 out of 21, indicating severe anxiety.       [place]-Suicide Severity Rating Scale   Suicide Ideation   1.) Have you ever wished you were dead or that you could go to sleep and not wake up?     Lifetime:  Yes   Past Month:  Yes     2.) Have you actually had any thoughts of killing yourself?   Lifetime:  No   Past Month:  No     3.) Have you been thinking about how you might do this?     Lifetime:  No   Past Month:  No     4.) Have you had these thoughts and had some intention of acting on them?     Lifetime:  No   Past Month:  No     5.) Have you started to work out the details of how  to kill yourself?   Lifetime:  No   Past Month:  No     6.) Do you intend to carry out this plan?      Lifetime:  No   Past Month:  No     Intensity of Ideation   Intensity of ideation (1 being least severe, 5 being most severe):     Lifetime:  The patient denied ever having any suicidal thoughts in life.   Past Month:  4     How often do you have these thoughts?  Daily or on almost daily basis     When you have the thoughts how long do they last?  Fleeting - few seconds or minutes     Can you stop thinking about killing yourself or wanting to die if you want to?  Yes, easily able to control thoughts     Are there things - anyone or anything (i.e. family, Rastafari, pain of death) that stopped you from wanting to die or acting on thoughts of suicide?  Protective factors definitely stopped you from attempting suicide     What sort of reasons did you have for thinking about wanting to die or killing yourself (ie end pain, stop how you were feeling, get attention or reaction, revenge)?  Mostly to end or stop the pain (you couldn't go on living the way you were feeling)     Suicidal Behavior   (Suicide Attempt) - Have you made a suicide attempt?     Lifetime:  The patient had never made a suicide attempt.   Past Month:  The patient had never made a suicide attempt.     Have you engaged in self-harm (non-suicidal self-injury)?  The patient denied having any history of engaging in self-harm (non-suicidal self-injury).     (Interrupted Attempt) - Has there been a time when you started to do something to end your life but someone or something stopped you before you actually did anything?  The patient denied having any history of an interrupted suicide attempt.     (Aborted or Self-Interrupted Attempt) - Has there been a time when you started to do something to try to end your life but you stopped yourself before you actually did anything?  The patient denied having any history of an aborted or self-interrupted suicide  attempt.     (Preparatory Acts of Behavior) - Have you taken any steps towards making suicide attempt or preparing to kill yourself (such as collecting pills, getting a gun, giving valuables away or writing a suicide note)?  The patient denied having any history of taking any steps towards making a suicide attempt or preparing to kill self.     Actual Lethality/Medical Damage:  The patient denied ever making a suicidal attempt.       2008  [facility]  Used with permission by Marycruz Morris, PhD.       Guide to C-SSRS Risk Ratings   NO IDEATION:  with no active thoughts IDEATION: with a wish to die. IDEATION: with active thoughts. Risk Ratings   If Yes No No 0 - Very Low Risk   If NA Yes No 1 - Low Risk   If NA Yes Yes 2 - Low/moderate risk   IDEATION: associated thoughts of methods without intent or plan INTENT: Intent to follow through on suicide PLAN: Plan to follow through on suicide Risk Ratings cont...   If Yes No No 3 - Moderate Risk   If Yes Yes No 4 - High Risk   If Yes Yes Yes 5 - High Risk   The patient's ADDITIONAL RISK FACTORS and lack of PROTECTIVE FACTORS may increase their overall suicide risk ratings.     Additional Risk Factors:    Significant history of having untreated or poorly treated mental health symptoms     A recent loss that was significant to the patient, i.e. loss of job, loss of home, divorce, break-up, etc.     A triggering event(s) leading to humiliation, shame or despair   Protective Factors:    Having people in his/her life that would prevent the patient from considering a suicide attempt (i.e. young children, spouse, parents, etc.)     Having pet(s) that give companionship and/or would prevent the patient from considering a suicide attempt     A positive relationship with his/her clinical medical and/or mental health providers     Having easy access to supportive family members     Risk Status   Past month: 1. - Low Risk: Evaluation Counselors:   "Document in Epic / SBAR to counselor \"Low Risk\".      Treatment Counselors:  Reassess upon admission as applicable, assess weekly in progress notes under Dimension 3 and summarize in Discharge / Treatment summary under Dimension 3.    Past 24 hours: 0. - Very Low Risk:  Evaluation Counselors:  Document in Epic / SBAR to counselor \"Very Low Risk\".      Treatment Counselors:  Reassess upon admission as applicable, assess weekly in progress notes under Dimension 3 and summarize in Discharge / Treatment summary under Dimension 3.   Additional information to support suicide risk rating: There was no additional information to provide at this time.     Mental Health Status   Physical Appearance/Attire: Appears stated age   Hygiene: well groomed   Eye Contact: at examiner   Speech Rate:  regular   Speech Volume: regular   Speech Quality: fluid   Cognitive/Perceptual:  reality based   Cognition: memory intact    Judgment: intact   Insight: intact   Orientation:  time, place, person and situation   Thought: logical    Hallucinations:  none   General Behavioral Tone: cooperative   Psychomotor Activity: no problem noted   Gait:  no problem   Mood: depressed   Affect: blunted/restricted   Counselor Notes: pt's CECILY was 0.05 @ 12:30pm     Criteria for Diagnosis: DSM-5 Criteria for Substance Use Disorders      Alcohol Use Disorder Severe - 303.90 (F10.20)  Tobacco Use Disorder Moderate - 305.10 (F17.200)    Level of Care   I.) Intoxication and Withdrawal: 1   II.) Biomedical:  1   III.) Emotional and Behavioral:  2   IV.) Readiness to Change:  2   V.) Relapse Potential: 4   VI.) Recovery Environmental: 3     Initial Problem List     The patient lacks relapse prevention skills  The patient has poor coping skills  The patient has poor refusal skills   The patient lacks a sober peer support network  The patient has a tendency to isolate  The patient has dual issues of MI and CD  The patient lacks the ability to effectively manage " his/her mental health issues  The patient has a significant history of guilt and shame issues    Patient/Client is willing to follow treatment recommendations.  Yes    Counselor: Sushma Rodriguez [facility]    Vulnerable Adult Checklist for LODGING:     This LODGING patient, or other Residential/Lodging CD Treatment patient is a categorical Vulnerable Adult according to [place] Statute 626.5572 subdivision 21.    Susceptibility to abuse by others     1.  Have you ever been emotionally abused by anyone?          No    2.  Have you ever been bullied, or physically assaulted by anyone?        No    3.  Have you ever been sexually taken advantage of or sexually assaulted?        No    4.  Have you ever been financially taken advantage of?        No    5.  Have you ever hurt yourself intentionally such as burns or cuts?       No    Risk of abusing other vulnerable adults     1.  Have you ever bullied, berated or emotionally degraded someone else?       No    2.  Have you ever financially taken advantage of someone else?       No    3.  Have you ever sexually exploited or assaulted another person?       No    4.  Have you ever gotten into fights, verbal arguments or physically assaulted someone?          No    Based on the above information:    This Lodging Plus patient, or other Residential/Lodging CD Treatment patient is a categorical Vulnerable Adult according to [place] Statue 626.5572 subdivision 21.                                                                                                                                                                                                       This person has a history of abuse, but is assessed as stable and not in need of an individual abuse prevention plan beyond the program abuse prevention plan.

## 2019-11-21 ENCOUNTER — HOSPITAL ENCOUNTER (INPATIENT)
Facility: CLINIC | Age: 59
LOS: 5 days | Discharge: HOME | DRG: 897 | End: 2019-11-26
Attending: EMERGENCY MEDICINE | Admitting: PSYCHIATRY & NEUROLOGY
Payer: COMMERCIAL

## 2019-11-21 DIAGNOSIS — F10.10 ALCOHOL ABUSE: Primary | ICD-10-CM

## 2019-11-21 DIAGNOSIS — F51.04 PSYCHOPHYSIOLOGICAL INSOMNIA: ICD-10-CM

## 2019-11-21 DIAGNOSIS — I10 ESSENTIAL HYPERTENSION: ICD-10-CM

## 2019-11-21 PROBLEM — F10.939 ALCOHOL WITHDRAWAL WITH COMPLICATION WITH INPATIENT TREATMENT, WITH UNSPECIFIED COMPLICATION (H): Status: ACTIVE | Noted: 2019-11-21

## 2019-11-21 LAB
ALBUMIN SERPL-MCNC: 3.7 G/DL (ref 3.4–5)
ALCOHOL BREATH TEST: 0 (ref 0–0.01)
ALP SERPL-CCNC: 120 U/L (ref 40–150)
ALT SERPL W P-5'-P-CCNC: 55 U/L (ref 0–70)
AMPHETAMINES UR QL SCN: NEGATIVE
ANION GAP SERPL CALCULATED.3IONS-SCNC: 8 MMOL/L (ref 3–14)
AST SERPL W P-5'-P-CCNC: 51 U/L (ref 0–45)
BARBITURATES UR QL: NEGATIVE
BASOPHILS # BLD AUTO: 0.1 10E9/L (ref 0–0.2)
BASOPHILS NFR BLD AUTO: 0.5 %
BENZODIAZ UR QL: NEGATIVE
BILIRUB SERPL-MCNC: 0.3 MG/DL (ref 0.2–1.3)
BUN SERPL-MCNC: 7 MG/DL (ref 7–30)
CALCIUM SERPL-MCNC: 9.5 MG/DL (ref 8.5–10.1)
CANNABINOIDS UR QL SCN: NEGATIVE
CHLORIDE SERPL-SCNC: 98 MMOL/L (ref 94–109)
CO2 SERPL-SCNC: 26 MMOL/L (ref 20–32)
COCAINE UR QL: NEGATIVE
CREAT SERPL-MCNC: 0.64 MG/DL (ref 0.66–1.25)
DIFFERENTIAL METHOD BLD: NORMAL
EOSINOPHIL # BLD AUTO: 0.1 10E9/L (ref 0–0.7)
EOSINOPHIL NFR BLD AUTO: 1 %
ERYTHROCYTE [DISTWIDTH] IN BLOOD BY AUTOMATED COUNT: 12 % (ref 10–15)
ETHANOL UR QL SCN: NEGATIVE
GFR SERPL CREATININE-BSD FRML MDRD: >90 ML/MIN/{1.73_M2}
GLUCOSE SERPL-MCNC: 100 MG/DL (ref 70–99)
HCT VFR BLD AUTO: 44.9 % (ref 40–53)
HGB BLD-MCNC: 15.7 G/DL (ref 13.3–17.7)
IMM GRANULOCYTES # BLD: 0.1 10E9/L (ref 0–0.4)
IMM GRANULOCYTES NFR BLD: 0.8 %
LYMPHOCYTES # BLD AUTO: 1.5 10E9/L (ref 0.8–5.3)
LYMPHOCYTES NFR BLD AUTO: 15.3 %
MCH RBC QN AUTO: 32.9 PG (ref 26.5–33)
MCHC RBC AUTO-ENTMCNC: 35 G/DL (ref 31.5–36.5)
MCV RBC AUTO: 94 FL (ref 78–100)
MONOCYTES # BLD AUTO: 1.1 10E9/L (ref 0–1.3)
MONOCYTES NFR BLD AUTO: 10.9 %
NEUTROPHILS # BLD AUTO: 6.9 10E9/L (ref 1.6–8.3)
NEUTROPHILS NFR BLD AUTO: 71.5 %
NRBC # BLD AUTO: 0 10*3/UL
NRBC BLD AUTO-RTO: 0 /100
OPIATES UR QL SCN: NEGATIVE
PLATELET # BLD AUTO: 405 10E9/L (ref 150–450)
POTASSIUM SERPL-SCNC: 4.8 MMOL/L (ref 3.4–5.3)
PROT SERPL-MCNC: 7.8 G/DL (ref 6.8–8.8)
RBC # BLD AUTO: 4.77 10E12/L (ref 4.4–5.9)
SODIUM SERPL-SCNC: 132 MMOL/L (ref 133–144)
WBC # BLD AUTO: 9.7 10E9/L (ref 4–11)

## 2019-11-21 PROCEDURE — 82075 ASSAY OF BREATH ETHANOL: CPT | Performed by: EMERGENCY MEDICINE

## 2019-11-21 PROCEDURE — 25000132 ZZH RX MED GY IP 250 OP 250 PS 637: Performed by: PSYCHIATRY & NEUROLOGY

## 2019-11-21 PROCEDURE — 99252 IP/OBS CONSLTJ NEW/EST SF 35: CPT | Performed by: PHYSICIAN ASSISTANT

## 2019-11-21 PROCEDURE — HZ2ZZZZ DETOXIFICATION SERVICES FOR SUBSTANCE ABUSE TREATMENT: ICD-10-PCS | Performed by: PSYCHIATRY & NEUROLOGY

## 2019-11-21 PROCEDURE — 99207 ZZC CDG-MDM COMPONENT: MEETS MODERATE - UP CODED: CPT | Performed by: PHYSICIAN ASSISTANT

## 2019-11-21 PROCEDURE — 25000132 ZZH RX MED GY IP 250 OP 250 PS 637: Performed by: EMERGENCY MEDICINE

## 2019-11-21 PROCEDURE — 99283 EMERGENCY DEPT VISIT LOW MDM: CPT | Performed by: EMERGENCY MEDICINE

## 2019-11-21 PROCEDURE — 80053 COMPREHEN METABOLIC PANEL: CPT | Performed by: EMERGENCY MEDICINE

## 2019-11-21 PROCEDURE — 80307 DRUG TEST PRSMV CHEM ANLYZR: CPT | Performed by: EMERGENCY MEDICINE

## 2019-11-21 PROCEDURE — 99284 EMERGENCY DEPT VISIT MOD MDM: CPT | Mod: Z6 | Performed by: EMERGENCY MEDICINE

## 2019-11-21 PROCEDURE — 12800008 ZZH R&B CD ADULT

## 2019-11-21 PROCEDURE — 85025 COMPLETE CBC W/AUTO DIFF WBC: CPT | Performed by: EMERGENCY MEDICINE

## 2019-11-21 PROCEDURE — 80320 DRUG SCREEN QUANTALCOHOLS: CPT | Performed by: EMERGENCY MEDICINE

## 2019-11-21 RX ORDER — ONDANSETRON 4 MG/1
4 TABLET, ORALLY DISINTEGRATING ORAL EVERY 6 HOURS PRN
Status: DISCONTINUED | OUTPATIENT
Start: 2019-11-21 | End: 2019-11-26 | Stop reason: HOSPADM

## 2019-11-21 RX ORDER — MULTIPLE VITAMINS W/ MINERALS TAB 9MG-400MCG
1 TAB ORAL ONCE
Status: COMPLETED | OUTPATIENT
Start: 2019-11-21 | End: 2019-11-21

## 2019-11-21 RX ORDER — NICOTINE 21 MG/24HR
1 PATCH, TRANSDERMAL 24 HOURS TRANSDERMAL DAILY
Status: DISCONTINUED | OUTPATIENT
Start: 2019-11-21 | End: 2019-11-26 | Stop reason: HOSPADM

## 2019-11-21 RX ORDER — LANOLIN ALCOHOL/MO/W.PET/CERES
100 CREAM (GRAM) TOPICAL ONCE
Status: COMPLETED | OUTPATIENT
Start: 2019-11-21 | End: 2019-11-21

## 2019-11-21 RX ORDER — ATENOLOL 50 MG/1
50 TABLET ORAL DAILY PRN
Status: DISCONTINUED | OUTPATIENT
Start: 2019-11-21 | End: 2019-11-26 | Stop reason: HOSPADM

## 2019-11-21 RX ORDER — HYDROXYZINE HYDROCHLORIDE 25 MG/1
25 TABLET, FILM COATED ORAL EVERY 4 HOURS PRN
Status: DISCONTINUED | OUTPATIENT
Start: 2019-11-21 | End: 2019-11-26 | Stop reason: HOSPADM

## 2019-11-21 RX ORDER — MULTIPLE VITAMINS W/ MINERALS TAB 9MG-400MCG
1 TAB ORAL DAILY
Status: DISCONTINUED | OUTPATIENT
Start: 2019-11-22 | End: 2019-11-26 | Stop reason: HOSPADM

## 2019-11-21 RX ORDER — LANOLIN ALCOHOL/MO/W.PET/CERES
100 CREAM (GRAM) TOPICAL DAILY
Status: DISCONTINUED | OUTPATIENT
Start: 2019-11-22 | End: 2019-11-26 | Stop reason: HOSPADM

## 2019-11-21 RX ORDER — ACETAMINOPHEN 325 MG/1
650 TABLET ORAL EVERY 4 HOURS PRN
Status: DISCONTINUED | OUTPATIENT
Start: 2019-11-21 | End: 2019-11-26 | Stop reason: HOSPADM

## 2019-11-21 RX ORDER — ALUMINA, MAGNESIA, AND SIMETHICONE 2400; 2400; 240 MG/30ML; MG/30ML; MG/30ML
30 SUSPENSION ORAL EVERY 4 HOURS PRN
Status: DISCONTINUED | OUTPATIENT
Start: 2019-11-21 | End: 2019-11-26 | Stop reason: HOSPADM

## 2019-11-21 RX ORDER — DIAZEPAM 5 MG
5-20 TABLET ORAL EVERY 30 MIN PRN
Status: DISCONTINUED | OUTPATIENT
Start: 2019-11-21 | End: 2019-11-26 | Stop reason: HOSPADM

## 2019-11-21 RX ORDER — TRAZODONE HYDROCHLORIDE 50 MG/1
50 TABLET, FILM COATED ORAL
Status: DISCONTINUED | OUTPATIENT
Start: 2019-11-21 | End: 2019-11-22

## 2019-11-21 RX ORDER — FOLIC ACID 1 MG/1
1 TABLET ORAL ONCE
Status: COMPLETED | OUTPATIENT
Start: 2019-11-21 | End: 2019-11-21

## 2019-11-21 RX ORDER — FOLIC ACID 1 MG/1
1 TABLET ORAL DAILY
Status: DISCONTINUED | OUTPATIENT
Start: 2019-11-22 | End: 2019-11-26 | Stop reason: HOSPADM

## 2019-11-21 RX ORDER — LOPERAMIDE HCL 2 MG
2 CAPSULE ORAL 4 TIMES DAILY PRN
Status: DISCONTINUED | OUTPATIENT
Start: 2019-11-21 | End: 2019-11-26 | Stop reason: HOSPADM

## 2019-11-21 RX ORDER — IBUPROFEN 600 MG/1
600 TABLET, FILM COATED ORAL EVERY 6 HOURS PRN
Status: DISCONTINUED | OUTPATIENT
Start: 2019-11-21 | End: 2019-11-26 | Stop reason: HOSPADM

## 2019-11-21 RX ADMIN — Medication 100 MG: at 10:07

## 2019-11-21 RX ADMIN — NICOTINE 1 PATCH: 21 PATCH, EXTENDED RELEASE TRANSDERMAL at 16:01

## 2019-11-21 RX ADMIN — MULTIPLE VITAMINS W/ MINERALS TAB 1 TABLET: TAB at 10:07

## 2019-11-21 RX ADMIN — FOLIC ACID 1 MG: 1 TABLET ORAL at 10:33

## 2019-11-21 ASSESSMENT — ANXIETY QUESTIONNAIRES: GAD7 TOTAL SCORE: 20

## 2019-11-21 ASSESSMENT — ENCOUNTER SYMPTOMS
ABDOMINAL PAIN: 0
NAUSEA: 0
SHORTNESS OF BREATH: 0
VOMITING: 0
TREMORS: 0

## 2019-11-21 ASSESSMENT — ACTIVITIES OF DAILY LIVING (ADL)
DRESS: INDEPENDENT
HYGIENE/GROOMING: INDEPENDENT
ORAL_HYGIENE: INDEPENDENT

## 2019-11-21 ASSESSMENT — MIFFLIN-ST. JEOR
SCORE: 1398.67
SCORE: 1403.2

## 2019-11-21 NOTE — PROGRESS NOTES
11/21/19 1506   Patient Belongings   Patient Belongings other (see comments)   Belongings Search Yes   Clothing Search Yes   Second Staff Laurent HIGGINS RN     Storage - Sweatshirt, sweatpants with string, shoes, beanie, gloves, cigarettes, lighter, cell charging cord,     Med room - wallet, cell phone, hearing aide case, 4 insurance  Cards,    Security - [employer] health savings card, [facility] visa, [facility] debit/visa, [employer] debit/MasterCard, [place] Drivers license, $87.00    A               Admission:  I am responsible for any personal items that are not sent to the safe or pharmacy.  [facility] is not responsible for loss, theft or damage of any property in my possession.    Signature:  _________________________________ Date: _______  Time: _____                                              Staff Signature:  ____________________________ Date: ________  Time: _____      2nd Staff person, if patient is unable/unwilling to sign:    Signature: ________________________________ Date: ________  Time: _____     Discharge:  [facility] has returned all of my personal belongings:    Signature: _________________________________ Date: ________  Time: _____                                          Staff Signature:  ____________________________ Date: ________  Time: _____

## 2019-11-21 NOTE — ED PROVIDER NOTES
"    [facility] EMERGENCY DEPARTMENT ([facility])    11/21/19        History     Chief Complaint   Patient presents with     Alcohol Problem     Patient drinks 12 packs of beer daily, last drink was yerday at 3 pm, no hx of withdrawal seizure. Has a bed on hold in detox     The history is provided by the patient and medical records.     Bert Aburto is a 59 year old male with a past medical history significant for prostate cancer s/p prostatectomy (7/16/2019), substance abuse, anxiety, and stress disorder who presents here to the Emergency Department seeking detox from alcohol. Patient has called ahead and has a bed on hold for detox. Patient reports that he drinks 12 or more beers daily. States his last drink was yesterday at about 3pm yesterday afternoon when he drank wine. Reports he went to bed early last night. Patient reports that he has been drinking daily for the past 1-2 months. Denies withdrawals currently and denies shaking. Denies withdrawal seizures. Denies suicidal ideations. Patient denies other drug use including marijuana.    Social: Lives with his wife at home. Patient lost his job in June and haven't worked since that time. H/o alcohol abuse    I have reviewed the Medications, Allergies, Past Medical and Surgical History, and Social History in the [facility] [employer] system.    Past Medical History:   Diagnosis Date     Cancer (H)      Substance abuse (H)        Past Surgical History:   Procedure Laterality Date     GENITOURINARY SURGERY         History reviewed. No pertinent family history.    Social History     Tobacco Use     Smoking status: Current Every Day Smoker     Packs/day: 1.50     Smokeless tobacco: Never Used   Substance Use Topics     Alcohol use: Yes     Comment: \"12 beers a day\"       No current facility-administered medications for this encounter.      Current Outpatient Medications   Medication     sertraline (ZOLOFT) 25 MG tablet     zolpidem (AMBIEN) 10 MG tablet      No Known " "Allergies      Review of Systems   Respiratory: Negative for shortness of breath.    Cardiovascular: Negative for chest pain.   Gastrointestinal: Negative for abdominal pain, nausea and vomiting.   Neurological: Negative for tremors.   Psychiatric/Behavioral: Negative for self-injury and suicidal ideas.   All other systems reviewed and are negative.      Physical Exam   BP: (!) 169/80  Pulse: 74  Temp: 97.8  F (36.6  C)  Resp: 16  Height: 170.2 cm (5' 7\")  Weight: 63.5 kg (140 lb)  SpO2: (SARAHI finger tip cyanotic/cold)      Physical Exam  Physical Exam   Constitutional:   well nourished, well developed, resting comfortably   HENT:   Head: Normocephalic and atraumatic.   Cardiovascular: regular rate and rhythm without murmurs or gallops  Pulmonary/Chest: Clear to auscultation bilaterally, with no wheezes or retractions. No respiratory distress.  GI: Soft with good bowel sounds.  Non-tender, non-distended, with no guarding, no rebound, no peritoneal signs.   Back:  No bony or CVA tenderness   Musculoskeletal:  no edema   Skin: Skin is warm and dry. No rash noted.   Neurological: alert and oriented to person, place, and time. Nonfocal exam, no tremor  Psychiatric:  normal mood and affect.   ED Course   9:47 AM  The patient was seen and examined by Tiffany Velasco MD in Room ED16C.        Procedures             Critical Care time:  none           Results for orders placed or performed during the hospital encounter of 11/21/19   CBC with platelets differential     Status: None   Result Value Ref Range    WBC 9.7 4.0 - 11.0 10e9/L    RBC Count 4.77 4.4 - 5.9 10e12/L    Hemoglobin 15.7 13.3 - 17.7 g/dL    Hematocrit 44.9 40.0 - 53.0 %    MCV 94 78 - 100 fl    MCH 32.9 26.5 - 33.0 pg    MCHC 35.0 31.5 - 36.5 g/dL    RDW 12.0 10.0 - 15.0 %    Platelet Count 405 150 - 450 10e9/L    Diff Method Automated Method     % Neutrophils 71.5 %    % Lymphocytes 15.3 %    % Monocytes 10.9 %    % Eosinophils 1.0 %    % Basophils 0.5 %    % " Immature Granulocytes 0.8 %    Nucleated RBCs 0 0 /100    Absolute Neutrophil 6.9 1.6 - 8.3 10e9/L    Absolute Lymphocytes 1.5 0.8 - 5.3 10e9/L    Absolute Monocytes 1.1 0.0 - 1.3 10e9/L    Absolute Eosinophils 0.1 0.0 - 0.7 10e9/L    Absolute Basophils 0.1 0.0 - 0.2 10e9/L    Abs Immature Granulocytes 0.1 0 - 0.4 10e9/L    Absolute Nucleated RBC 0.0    Comprehensive metabolic panel     Status: Abnormal   Result Value Ref Range    Sodium 132 (L) 133 - 144 mmol/L    Potassium 4.8 3.4 - 5.3 mmol/L    Chloride 98 94 - 109 mmol/L    Carbon Dioxide 26 20 - 32 mmol/L    Anion Gap 8 3 - 14 mmol/L    Glucose 100 (H) 70 - 99 mg/dL    Urea Nitrogen 7 7 - 30 mg/dL    Creatinine 0.64 (L) 0.66 - 1.25 mg/dL    GFR Estimate >90 >60 mL/min/[1.73_m2]    GFR Estimate If Black >90 >60 mL/min/[1.73_m2]    Calcium 9.5 8.5 - 10.1 mg/dL    Bilirubin Total 0.3 0.2 - 1.3 mg/dL    Albumin 3.7 3.4 - 5.0 g/dL    Protein Total 7.8 6.8 - 8.8 g/dL    Alkaline Phosphatase 120 40 - 150 U/L    ALT 55 0 - 70 U/L    AST 51 (H) 0 - 45 U/L   Alcohol breath test POCT     Status: Normal   Result Value Ref Range    Alcohol Breath Test 0.000 0.00 - 0.01      Labs Ordered and Resulted from Time of ED Arrival Up to the Time of Departure from the ED   COMPREHENSIVE METABOLIC PANEL - Abnormal; Notable for the following components:       Result Value    Sodium 132 (*)     Glucose 100 (*)     Creatinine 0.64 (*)     AST 51 (*)     All other components within normal limits   ALCOHOL BREATH TEST POCT - Normal   CBC WITH PLATELETS DIFFERENTIAL   DRUG ABUSE SCREEN 6 CHEM DEP URINE ([facility])            Assessments & Plan (with Medical Decision Making)       I have reviewed the nursing notes.  Emergency Department course:  The patient was seen and examined at 0947 am. Breathalyzer is 0.00.   I treated him with thiamine, folic acid and multivitamins po.   Laboratory studies today show an unremarkable CBC, with a WBC of 9.7.  Comprehensive metabolic panel shows  hyponatremia, with a sodium of 132.  AST is slightly elevated at 51.  The remainder of the panel is unremarkable.  Urine tox screen is negative.     The patient is a 59-year-old male with a history of alcohol abuse here for alcohol detox.  He will be admitted here to [facility] for treatment. He was admitted upstairs in the early afternoon.         I have reviewed the findings, diagnosis, plan and need for follow up with the patient.    New Prescriptions    No medications on file       Final diagnoses:   Alcohol abuse     I, Angelito Marinelli, am serving as a trained medical scribe to document services personally performed by Tiffany Velasco MD, based on the provider's statements to me.   I, Tiffany Velasco MD, was physically present and have reviewed and verified the accuracy of this note documented by Angelito Marinelli.  This note was created in part by the use of Dragon voice recognition dictation system. Inadvertent grammatical errors and typographical errors may still exist.  Tiffany Velasco MD    11/21/2019   [facility] EMERGENCY DEPARTMENT     Tiffany Velasco MD  11/21/19 6027

## 2019-11-21 NOTE — CONSULTS
Consult Date:  11/21/2019      HISTORY OF PRESENT ILLNESS:  The patient is a 59-year-old male admitted to station 3A for alcohol abuse and detoxification.  He has been drinking at least a 6-pack of beer daily for the past couple of months; however, breathalyzer on admission was 0.0.  An Internal Medicine consultation was ordered by Dr. Almeida to assess medical problems including hyponatremia.  At this time, Bert denies acute physical concerns including fever, chills, chest pain, shortness of breath, abdominal pain, nausea, bowel or bladder concerns including melena.      PAST MEDICAL HISTORY:   1.  Psychiatric history per Dr. Almeida.   2.  History of prostate cancer, in remission, status post prostatectomy.   3.  Denies history of other chronic medical problems and surgeries including cardiopulmonary disease, hypertension and diabetes.      ADMISSION MEDICATIONS:  None.      ALLERGIES:  NO KNOWN DRUG ALLERGIES.      SOCIAL HISTORY:   with 1 grown child.  Currently unemployed.  Lives in [place].  Smokes on average 20 cigarettes daily.      FAMILY HISTORY:  Reviewed and noncontributory.      REVIEW OF SYSTEMS:  Ten-point review of systems negative except as stated above in history of present illness.      PHYSICAL EXAMINATION:   GENERAL:  Nontoxic-appearing man in no acute distress.   VITAL SIGNS:  Stable.  Temperature afebrile, pulse 96, blood pressure 160s/70s.   HEENT:  Poor dentition, otherwise negative.   NECK:  Supple.  No cervical lymphadenopathy or thyromegaly.   LUNGS:  Clear.   CARDIOVASCULAR:  Regular rate and rhythm.   ABDOMEN:  Soft, nontender.   EXTREMITIES:  No edema.   SKIN:  No rashes noted on exposed areas.   NEUROLOGIC:  He is awake, alert and oriented x3.  Motor strength symmetric.  He is not tremulous.      LABORATORY DATA:  Sodium level 132, AST 51, otherwise CBC and rest of comprehensive metabolic panel normal.  Urine drug screen negative.  Breathalyzer negative.      ASSESSMENT:    1.  Alcohol abuse and withdrawal per Dr. Almeida.   2.  Mild hyponatremia, question hypovolemic due to poor p.o. water intake prior to admission.   3.  Otherwise, overall in apparent normally good physical health.      PLAN:  Repeat sodium level tomorrow a.m.  No further medical intervention indicated at this time.  Medicine will follow up on results of sodium level and if trending towards normal or indeed normal, will sign off.  Please feel free to call with questions.      Thank you for this consultation.         RODERICK LYNN PA-C             D: 2019   T: 2019   MT: PAVITHRA      Name:     LEONA VALE   MRN:      [id number]        Account:       [id number]   :      1960           Consult Date:  2019      Document: [id number]

## 2019-11-21 NOTE — ED NOTES
"ED to Behavioral Floor Handoff    SITUATION  Bert Aburto is a 59 year old male who speaks English and lives in a home with a spouse The patient arrived in the ED by private car from home with a complaint of Alcohol Problem (Patient drinks 12 packs of beer daily, last drink was yerday at 3 pm, no hx of withdrawal seizure. Has a bed on hold in detox)  .The patient's current symptoms started/worsened 2 month(s) ago and during this time the symptoms have increased.   In the ED, pt was diagnosed with   Final diagnoses:   None        Initial vitals were: BP: (!) 169/80  Pulse: 74  Temp: 97.8  F (36.6  C)  Resp: 16  Height: 170.2 cm (5' 7\")  Weight: 63.5 kg (140 lb)  SpO2: (SARAHI finger tip cyanotic/cold)   --------  Is the patient diabetic? Yes   If yes, last blood glucose? --     If yes, was this treated in the ED? --  --------  Is the patient inebriated (ETOH) No or Impaired on other substances? No  MSSA done? Yes  Last MSSA score: --    Were withdrawal symptoms treated? N/A  Does the patient have a seizure history? No. If yes, date of most recent seizure--  --------  Is the patient patient experiencing suicidal ideation? denies current or recent suicidal ideation     Homicidal ideation? denies current or recent homicidal ideation or behaviors.    Self-injurious behavior/urges? denies current or recent self injurious behavior or ideation.  ------  Was pt aggressive in the ED No  Was a code called No  Is the pt now cooperative? Yes  -------  Meds given in ED:   Medications   vitamin B1 (THIAMINE) tablet 100 mg (100 mg Oral Given 11/21/19 1007)   folic acid (FOLVITE) tablet 1 mg (1 mg Oral Given 11/21/19 1033)   multivitamin w/minerals (THERA-VIT-M) tablet 1 tablet (1 tablet Oral Given 11/21/19 1007)      Family present during ED course? No  Family currently present? No    BACKGROUND  Does the patient have a cognitive impairment or developmental disability? No  Allergies: No Known Allergies.   Social demographics are " "  Social History     Socioeconomic History     Marital status:      Spouse name: None     Number of children: None     Years of education: None     Highest education level: None   Occupational History     None   Social Needs     Financial resource strain: None     Food insecurity:     Worry: None     Inability: None     Transportation needs:     Medical: None     Non-medical: None   Tobacco Use     Smoking status: Current Every Day Smoker     Packs/day: 1.50     Smokeless tobacco: Never Used   Substance and Sexual Activity     Alcohol use: Yes     Comment: \"12 beers a day\"     Drug use: Never     Sexual activity: None   Lifestyle     Physical activity:     Days per week: None     Minutes per session: None     Stress: None   Relationships     Social connections:     Talks on phone: None     Gets together: None     Attends Baptism service: None     Active member of club or organization: None     Attends meetings of clubs or organizations: None     Relationship status: None     Intimate partner violence:     Fear of current or ex partner: None     Emotionally abused: None     Physically abused: None     Forced sexual activity: None   Other Topics Concern     Parent/sibling w/ CABG, MI or angioplasty before 65F 55M? Not Asked   Social History Narrative     None        ASSESSMENT  Labs results   Labs Ordered and Resulted from Time of ED Arrival Up to the Time of Departure from the ED   COMPREHENSIVE METABOLIC PANEL - Abnormal; Notable for the following components:       Result Value    Sodium 132 (*)     Glucose 100 (*)     Creatinine 0.64 (*)     AST 51 (*)     All other components within normal limits   ALCOHOL BREATH TEST POCT - Normal   CBC WITH PLATELETS DIFFERENTIAL   DRUG ABUSE SCREEN 6 CHEM DEP URINE ([facility])      Imaging Studies: No results found for this or any previous visit (from the past 24 hour(s)).   Most recent vital signs BP (!) 169/80   Pulse 74   Temp 97.8  F (36.6  C) (Oral)   Resp 16   Ht " "1.702 m (5' 7\")   Wt 63.5 kg (140 lb)   BMI 21.93 kg/m     Abnormal labs/tests/findings requiring intervention:---   Pain control: pt had none  Nausea control: pt had none    RECOMMENDATION  Are any infection precautions needed (MRSA, VRE, etc.)? No If yes, what infection? --  ---  Does the patient have mobility issues? independently. If yes, what device does the pt use? ---  ---  Is patient on 72 hour hold or commitment? No If on 72 hour hold, have hold and rights been given to patient? N/A  Are admitting orders written if after 10 p.m. ?N/A  Tasks needing to be completed:---     Selene Vergara RN   [facility]--    [phone] [place] [facility]   [phone] [facility] ED  "

## 2019-11-21 NOTE — PLAN OF CARE
Pt presents to Station 3A for treatment of alcohol withdrawal. No previous detox or treatment.  Pt reports drinking 12 beers per day x  several months with last use being yesterday. Pt states he is having negative consequences related to his alcohol use. Lost his job. Relationship issues.  Pt wishes to detox and enter into a treatment program. He states he completed a rule 25 assessment at [facility]. Denies SI/SIB.  MSSA upon arrival = 7.  Pt will be monitored for signs and symptoms of alcohol withdrawal and will be treated with the appropriate protocols.

## 2019-11-22 LAB — SODIUM SERPL-SCNC: 133 MMOL/L (ref 133–144)

## 2019-11-22 PROCEDURE — 36415 COLL VENOUS BLD VENIPUNCTURE: CPT | Performed by: PHYSICIAN ASSISTANT

## 2019-11-22 PROCEDURE — 12800008 ZZH R&B CD ADULT

## 2019-11-22 PROCEDURE — 84295 ASSAY OF SERUM SODIUM: CPT | Performed by: PHYSICIAN ASSISTANT

## 2019-11-22 PROCEDURE — H2035 A/D TX PROGRAM, PER HOUR: HCPCS

## 2019-11-22 PROCEDURE — 25000132 ZZH RX MED GY IP 250 OP 250 PS 637: Performed by: PSYCHIATRY & NEUROLOGY

## 2019-11-22 PROCEDURE — 99222 1ST HOSP IP/OBS MODERATE 55: CPT | Mod: AI | Performed by: PSYCHIATRY & NEUROLOGY

## 2019-11-22 RX ORDER — LANOLIN ALCOHOL/MO/W.PET/CERES
3 CREAM (GRAM) TOPICAL
Status: DISCONTINUED | OUTPATIENT
Start: 2019-11-22 | End: 2019-11-26 | Stop reason: HOSPADM

## 2019-11-22 RX ADMIN — HYDROXYZINE HYDROCHLORIDE 25 MG: 25 TABLET, FILM COATED ORAL at 04:22

## 2019-11-22 RX ADMIN — MULTIPLE VITAMINS W/ MINERALS TAB 1 TABLET: TAB at 08:27

## 2019-11-22 RX ADMIN — FOLIC ACID 1 MG: 1 TABLET ORAL at 08:27

## 2019-11-22 RX ADMIN — NICOTINE 1 PATCH: 21 PATCH, EXTENDED RELEASE TRANSDERMAL at 08:27

## 2019-11-22 RX ADMIN — Medication 100 MG: at 08:27

## 2019-11-22 RX ADMIN — DIAZEPAM 10 MG: 5 TABLET ORAL at 04:22

## 2019-11-22 ASSESSMENT — ACTIVITIES OF DAILY LIVING (ADL)
DRESS: INDEPENDENT
HYGIENE/GROOMING: INDEPENDENT
ORAL_HYGIENE: INDEPENDENT

## 2019-11-22 NOTE — PROGRESS NOTES
11/21/19 3274   General Information   Art Directive other (see comments)     AT directive was to create an image of self as a landscape, using features of landscapes as metaphors for self. Goals of directive: creating a personal self narrative, identifying positive strengths and goals, emotional expression. Pt was a positive participant, focused on task for the full duration of group. Pt finished drawing and briefly shared with group.  Pts mood was calm.

## 2019-11-22 NOTE — PHARMACY-ADMISSION MEDICATION HISTORY
Admission Medication History status for the 11/21/2019 admission is complete.  See EPIC admission navigator for Prior to Admission medications.    Medication history sources:  Patient, MN      Medication history source reliability: Moderate    Medication adherence:  Good    Changes made to PTA medication list (reason)  Added: n/a  Deleted: n/a  Changed: Sertraline 25mg: Take 20mg by mouth daily to: Sertraline 50mg: Take 1 tablet by mouth daily (Per patient)    Additional medication history information (including reliability of information, actions taken by pharmacist):   -Pt was a moderate historian; he knew all medication names and last administration times, however reported taking Ambien 30mg nightly prn as compared to the prescribed dose of 10mg nightly prn (confirmed via MN ).   -Preferred pharmacy is [facility] [address])    Time spent in this activity: 10 minutes    Medication history completed by: Berenice Lim PD4 Pharmacy Intern     Prior to Admission medications    Medication Sig Last Dose Taking? Auth Provider   sertraline (ZOLOFT) 50 MG tablet Take 50 mg by mouth daily  Past Week at Unknown time Yes Reported, Patient   zolpidem (AMBIEN) 10 MG tablet Take 10 mg by mouth nightly as needed for sleep 11/20/2019 at Unknown time Yes Reported, Patient

## 2019-11-22 NOTE — H&P
"Admitted:     11/21/2019      LEGAL STATUS AND REASON FOR ADMISSION:  The patient was admitted on a voluntary status after presenting to the Emergency Department seeking admission for alcohol detoxification and requested referral to CD treatment.      SOURCES FOR INTAKE:  The patient's interview and review of available medical records.      CHIEF COMPLAINT:  \"My wife helped get here.\"      HISTORY OF PRESENT ILLNESS:  Bert Aburto is a 59-year-old  male with history of alcoholism, status post prostatectomy and anxiety, who presented to the Emergency Department seeking admission for alcohol detoxification.  He was at [facility] Emergency Department earlier in the week and completed a CD assessment prior to admission.  He lives with his wife.  He lost his job as an  due to poor functioning secondary to alcohol use and is currently unemployed.  He has had 3 DWIs, most recent 1 was in 2008.  He has never been to CD treatment.  He is prescribed Zoloft and Ambien, but has not been compliant with the Zoloft, which he takes it for anxiety.  He also tells me that he was prescribed 10 mg of Ambien, but had been taking up to 30 mg in the past 2-3 weeks.      The patient smokes more than 1 pack of tobacco per day.  He used marijuana in high school but none since.  He acknowledged that he has been prescribed Ambien for over 10 years, 10 mg at bedtime, but acknowledged that he has been taking up to 30 mg in the past 2-3 weeks.  No over-the-counter medication misuse.  No history of cocaine, heroin or methamphetamine use.  Drug of choice is alcohol.  He started drinking in high school, which became habitual shortly after.  Longest sobriety was only 1 month.  He drinks daily, tells me that about 6-8 beers per day, but at the Emergency Department, he stated that he drinks up to a 12-pack of beer per day.  He has had withdrawals, but no DTs or seizures.  He acknowledged progressive use, use despite negative " "consequence and lack of control.  Last alcohol use was 2 days ago.  He is interested in door to [id number] referral to residential CD treatment, acknowledging that he has difficulty maintaining sobriety in the community and is fearful that he will relapse.      The patient does not have a psychiatrist.  He sees a therapist, Renu Benavides, for individual therapy.  He has known her for about a year, but has not seen her lately.  He has been prescribed Zoloft for a couple for a few months, but has not been compliant with it.  He takes it \"only when I'm anxious at bedtime.\"  He has been prescribed Ambien for about 10 years at 10 mg at bedtime, but acknowledged he has been taking on average 30 mg per night for the past 2-3 weeks.  He has been diagnosed with anxiety.  No prior suicidal attempts or self-harming behavior.  No inpatient hospitalization for mental illness.      Apart from stress and frustration related to heavy alcohol consumption, the patient denied any recent bouts of depression, hopelessness, helplessness, thought of suicide.  He is future oriented.  He contracted for safety in the unit and in the community.  He endorsed intermittent anxiety, which seems to be situational in nature.  He acknowledged limited coping skills.  Some anxiety could be related also may be related to alcohol withdrawal.  His sleep and appetite have been intact.  Energy, motivation, concentration and focus fluctuate.  He reported no history or symptoms related to margie or psychosis.  No history or symptoms related to PTSD or OCD.  He stated that his memory is intact, denied any recent confusion or disorientation.      PAST MEDICAL HISTORY:  The patient has a history of prostate cancer, status post prostatectomy in 2018.  No other surgical history.  He tells me that he had a concussion about a year ago after falling while intoxicated.  HE DENIED MEDICAL ALLERGIES.  He describes fatigue and deconditioning secondary to heavy alcohol " use lately.      FAMILY HISTORY:  The patient tells me that older sister and younger brother both have bipolar disorder.  No chemical dependency in the family.      SOCIAL HISTORY:  The patient grew up in suburbs of [place], was raised by both parents with 7 siblings.  Denied childhood abuse and neglect.  Graduated high school on time, completed college and has a Master's degree in Environmental and Civil Engineering.  He has been  for 30 years.  He has 1 child.  He currently resides with his wife.  Denies legal history.  He is unemployed, lost his job recently.      PHYSICAL EXAMINATION:  Please refer to the physical exam done by Anish Marie on 11/21/2019.      REVIEW OF SYSTEMS:  A 12-point review of system was obtained and negative except for HPI.      LABORATORY DATA:  Urine drug screen was negative on 11/21.  On 11/18, it was positive for alcohol with alcohol breathalyzer of 0.069.  CBC and CMP were within normal limits except for sodium 132 (was 133), creatinine 0.64, AST 51.  Nonfasting glucose 100.        VITAL SIGNS:  Temperature 98, respiratory rate 16, heart rate 87, blood pressure 158/103.      PSYCHIATRIC EXAMINATION:  A 59-year-old  male appears his stated age, cooperative, pleasant, engaged, established good rapport and eye contact.  No major psychomotor abnormality, tics or tremors were noted.  His speech was normal pace, volume and clarity.  Gait and muscle tone within normal limits.  Mood described as anxious with full reactive and fully engaged affect.  Thought process was linear and goal directed.  Thought content:  He denied current thought of suicide, urges to self-harm, denied hallucination and perceptual disturbances.  Sensorium was clear.  He was oriented to time, place, person and situation.  Immediate and remote memory intact.  Language and fund of knowledge adequate for age and level of training.  Concentration and focus intact.  Insight and judgment fair to good for  safety at the current level of care.      DIAGNOSES:   1.  Alcohol use disorder, severe.   2.  Anxiety, not otherwise specified.   3.  Rule out substance-induced mood disorder.   4.  Hypnotic use disorder.      PLAN AND RECOMMENDATIONS:  The patient was admitted to detox on a voluntary status after presenting to the Emergency Department seeking admission for detoxification and referral to CD treatment.  The patient completed CD assessment prior to admission.  He is interested in [id number] referral to residential CD treatment.  In addition to alcohol abuse, patient also endorsed acknowledged that he has been misusing Ambien.      After reviewing proposed treatment and medication profile, patient agreed to the followin.  The patient was placed on alcohol withdrawal protocol including MSSA-Valium p.r.n., multivitamin, folic acid and thiamine.  Ambien was discontinued and replaced with melatonin 3 mg as needed for insomnia.   2.  PRN hydroxyzine for anxiety will be offered.   3.  Zoloft was discontinued due to noncompliance.      Internal Medicine consult was obtained to address physical complaints and for health maintenance.  Psychosocial assessment was obtained by our clinical  who will assist with setting up post-discharge care.  The patient will be granted discharge once completed detox and establishes safety in the community.  The patient will likely need [id number]-prlg referral to residential CD treatment due to high risk for relapse and inability to maintain sobriety in the community.         LEVI DIAS MD             D: 2019   T: 2019   MT: PAVITHRA      Name:     LEONA VALE   MRN:      [id number]        Account:      [id number]   :      1960        Admitted:     2019                   Document: [id number]       cc: Chela Brambila MD

## 2019-11-22 NOTE — PROGRESS NOTES
Writer met with pt to discuss aftercare plans. Pt reports he had a CD assessment on 11/20/19 at [facility]. Writer verified in chart review assessment was completed on 11/20/19. Pt reports he is thinking about treatment at [facility] but needs to speak with his wife first to confirm this plan. Pt reports his wife is planning to come to the unit at 1:00 pm today to drop off pt's hearing aids. Pt reports that he will update writer after speaking with his wife on treatment plan. Writer informed pt that she would assist with coordinating admission to [facility] or other treatment facility of his choice. Pt is aware that minimal case management is available over the weekend. Writer provided pt with phone number for [facility] in the chance that he does not make a decision by end of workday and needs to follow-up with coordinating admission over the weekend. Pt has [facility] for insurance.  CM continues    Update: Writer spoke with pt and his wife. They report they would like treatment at [facility] and can pay the out-of-pocket costs plus lodging. Pt had Rule 25 and addendum, sbar1 note on 11/20. Pt and wife would like for him to stay until Monday 11/25 to see if there is a bed available for admission. AVS initiated.

## 2019-11-22 NOTE — PLAN OF CARE
Behavioral Team Discussion: (11/22/2019)    Continued Stay Criteria/Rationale: Patient admitted for Chemical Use Issues.  Plan: The following services will be provided to the patient; psychiatric assessment, medication management, therapeutic milieu, individual and group support, and skills groups.   Participants: 3A Provider: Dr. Shashank Almeida MD; 3A RN's: Laurent Lockhart RN; 3A CM's: Dariela Aguilar.  Summary/Recommendation: Providers will assess today for treatment recommendations, discharge planning, and aftercare plans. CM will meet with pt for discharge planning.   Medical/Physical: Per ED note:  Past Medical History:   Diagnosis Date     Cancer (H)       Substance abuse (H)              Past Surgical History         Past Surgical History:   Procedure Laterality Date     GENITOURINARY SURGERY          Precautions:   Behavioral Orders   Procedures     Code 1 - Restrict to Unit     Routine Programming     As clinically indicated     Status 15     Every 15 minutes.     Withdrawal precautions     Rationale for change in precautions or plan: N/A  Progress: Initial.

## 2019-11-22 NOTE — PROGRESS NOTES
Brief Medicine Note:    IM peripherally following Na which has normalized. Medicine will sign off. Please contact with future questions or concerns    Michelle Greenwood PA-C  Internal Medicine [facility]  [phone]

## 2019-11-22 NOTE — PROGRESS NOTES
Pt called wife to bring new hearing aid batteries. Pt states wife will drop batteries off around 13:30 on Friday 11/22

## 2019-11-23 PROCEDURE — H2032 ACTIVITY THERAPY, PER 15 MIN: HCPCS

## 2019-11-23 PROCEDURE — 25000132 ZZH RX MED GY IP 250 OP 250 PS 637: Performed by: PSYCHIATRY & NEUROLOGY

## 2019-11-23 PROCEDURE — 12800008 ZZH R&B CD ADULT

## 2019-11-23 RX ADMIN — Medication 100 MG: at 09:06

## 2019-11-23 RX ADMIN — MULTIPLE VITAMINS W/ MINERALS TAB 1 TABLET: TAB at 09:06

## 2019-11-23 RX ADMIN — FOLIC ACID 1 MG: 1 TABLET ORAL at 09:06

## 2019-11-23 RX ADMIN — NICOTINE 1 PATCH: 21 PATCH, EXTENDED RELEASE TRANSDERMAL at 09:07

## 2019-11-23 ASSESSMENT — ACTIVITIES OF DAILY LIVING (ADL)
ORAL_HYGIENE: INDEPENDENT
DRESS: INDEPENDENT
HYGIENE/GROOMING: INDEPENDENT

## 2019-11-23 NOTE — PLAN OF CARE
"Patient attended and participated in evening OT group focused on gratitude. A structured activity was used to promote engagement and creative expression while group discussion offered an opportunity to share and process the benefits of gratitude. Patient asserted to writer that he is hard of hearing right away. He was pleasant on approach with full range of affect. He listened to initial instructions and said \"i'm not really following\" but was able to complete task after step-by-step instruction. He was engaged fur the duration of the group, participating in discussion and completing structured task. He was casually conversational with peer. He identified feeling gratitude for several subjects including his daughter for whom he expressed a lot of pride.  "

## 2019-11-23 NOTE — PROGRESS NOTES
Patient has not required valium for over 24 hours. Last MSSA was 4; pt denied any withdrawal symptoms. Pt has now been removed from alcohol detox monitoring. We'll continue to monitor.

## 2019-11-24 PROCEDURE — 25000132 ZZH RX MED GY IP 250 OP 250 PS 637: Performed by: PSYCHIATRY & NEUROLOGY

## 2019-11-24 PROCEDURE — 12800008 ZZH R&B CD ADULT

## 2019-11-24 PROCEDURE — H2032 ACTIVITY THERAPY, PER 15 MIN: HCPCS

## 2019-11-24 RX ADMIN — NICOTINE 1 PATCH: 21 PATCH, EXTENDED RELEASE TRANSDERMAL at 09:08

## 2019-11-24 RX ADMIN — FOLIC ACID 1 MG: 1 TABLET ORAL at 09:08

## 2019-11-24 RX ADMIN — Medication 100 MG: at 09:08

## 2019-11-24 RX ADMIN — MULTIPLE VITAMINS W/ MINERALS TAB 1 TABLET: TAB at 09:08

## 2019-11-24 ASSESSMENT — ACTIVITIES OF DAILY LIVING (ADL)
DRESS: INDEPENDENT
ORAL_HYGIENE: INDEPENDENT
HYGIENE/GROOMING: INDEPENDENT

## 2019-11-24 NOTE — PLAN OF CARE
"Pt out of detox at this time. Pt is waiting for placement at [facility]. He is hoping for a \"door to door\" admission. Pt is pleasant and cooperative. Denies SI/SIB. Appetite is good. No physical complaints at this time.  "

## 2019-11-24 NOTE — PROGRESS NOTES
" 11/23/19 2200   Art Therapy   Type of Intervention structured groups   Response participates with cues/redirection   Hours 1   Treatment Detail   (Art Therapy- crystal ball goal oriented directive)   Goal-Goal- cope, express, regulate and reflect through Art Therapy directives    Outcome- Pt was a positive participant and did a thoughtful project. He said he was curious about what one month from now looked like. He stated he had never been in detox. He talked about being honest and forthcoming with family members about his condition. He was pleased his daughter visited from out of town and is very proud of her accomplishments and her medical school tract. He was worried he is a \"burden\" to her and she reassured him otherwise.   "

## 2019-11-25 PROCEDURE — 25000132 ZZH RX MED GY IP 250 OP 250 PS 637: Performed by: PHYSICIAN ASSISTANT

## 2019-11-25 PROCEDURE — 12800008 ZZH R&B CD ADULT

## 2019-11-25 PROCEDURE — 25000132 ZZH RX MED GY IP 250 OP 250 PS 637: Performed by: PSYCHIATRY & NEUROLOGY

## 2019-11-25 PROCEDURE — 99232 SBSQ HOSP IP/OBS MODERATE 35: CPT | Performed by: PSYCHIATRY & NEUROLOGY

## 2019-11-25 RX ORDER — AMLODIPINE BESYLATE 5 MG/1
5 TABLET ORAL DAILY
Status: DISCONTINUED | OUTPATIENT
Start: 2019-11-25 | End: 2019-11-26 | Stop reason: HOSPADM

## 2019-11-25 RX ADMIN — Medication 100 MG: at 08:48

## 2019-11-25 RX ADMIN — MULTIPLE VITAMINS W/ MINERALS TAB 1 TABLET: TAB at 08:48

## 2019-11-25 RX ADMIN — FOLIC ACID 1 MG: 1 TABLET ORAL at 08:48

## 2019-11-25 RX ADMIN — AMLODIPINE BESYLATE 5 MG: 5 TABLET ORAL at 13:38

## 2019-11-25 RX ADMIN — NICOTINE 1 PATCH: 21 PATCH, EXTENDED RELEASE TRANSDERMAL at 08:48

## 2019-11-25 ASSESSMENT — ACTIVITIES OF DAILY LIVING (ADL)
LAUNDRY: WITH SUPERVISION
HYGIENE/GROOMING: INDEPENDENT
ORAL_HYGIENE: INDEPENDENT
DRESS: INDEPENDENT

## 2019-11-25 NOTE — PROVIDER NOTIFICATION
With pt having ongoing hypertension, updated internal medicine Bev and she has placed an order for amlodipine.

## 2019-11-25 NOTE — PROGRESS NOTES
[facility], [place]   Psychiatric Progress Note        Interim History:   The patient's care was discussed with the treatment team during the daily team meeting and/or staff's chart notes were reviewed.  Staff report patient is doing well. No significant dep or anx reported. No SI or ROSIE. Sleeping and eating well. No overt psychosis, margie or confusion. Requested referral to [facility] and completed CD assessment. No overt psychosis,margie or confusion.     The patient noted that he is feeling better. Withdrawal resolved but fearful of relapsing if discharge to the community. He denied dep, anx and SI. No hallucination or racing thoughts. Sleeping and eating well. Hopes to discharge directly to [facility] program.     Discussed medications and care plan.        Medications:       folic acid  1 mg Oral Daily     multivitamin w/minerals  1 tablet Oral Daily     nicotine  1 patch Transdermal Daily     nicotine   Transdermal Q8H     nicotine   Transdermal Daily     vitamin B1  100 mg Oral Daily          Allergies:   No Known Allergies       Labs:   No results found for this or any previous visit (from the past 24 hour(s)).       Psychiatric Examination:     Vitals:    11/24/19 0820 11/24/19 1628 11/25/19 0804 11/25/19 1006   BP: (!) 142/83 (!) 161/83 (!) 167/82 (!) 160/84   BP Location:    Left arm   Pulse: 79 86 68 86   Resp: 16 17 16 16   Temp: 97.4  F (36.3  C) 97.5  F (36.4  C) 97.6  F (36.4  C) 97.6  F (36.4  C)   TempSrc: Oral Tympanic Oral Oral   SpO2: 98% 100% 97%    Weight:       Height:                                                      Weight is 141 lbs 0 oz  Body mass index is 22.08 kg/m .    Appearance: awake, alert, adequately groomed, appeared as age stated and no apparent distress  Attitude:  cooperative  Eye Contact:  good  Mood:  better and good  Affect:  appropriate and in normal range, mood congruent, full range and reactive  Speech:  clear, coherent and normal  prosody  Psychomotor Behavior:  no evidence of tardive dyskinesia, dystonia, or tics and intact station, gait and muscle tone  Throught Process:  linear and goal oriented  Associations:  no loose associations  Thought Content:  no evidence of suicidal ideation or homicidal ideation and no evidence of psychotic thought   Insight:  fair  Judgement:  intact  Oriented to:  time, person, and place  Attention Span and Concentration:  intact  Recent and Remote Memory:  intact         Precautions:     Behavioral Orders   Procedures     Code 1 - Restrict to Unit     Routine Programming     As clinically indicated     Status 15     Every 15 minutes.     Withdrawal precautions          Diagnoses:     1.  Alcohol use disorder, severe.   2.  Anxiety, not otherwise specified.   3.  Rule out substance-induced mood disorder.   4.  Hypnotic use disorder.          Plan:     Medications:  1.  The patient was placed on alcohol withdrawal protocol including MSSA-Valium p.r.n., multivitamin, folic acid and thiamine.  Ambien was discontinued and replaced with melatonin 3 mg as needed for insomnia.   2.  PRN hydroxyzine for anxiety will be offered.   3.  Zoloft was discontinued due to noncompliance.     Legal Status and Disposition:  --  volunt.  --  The patient will be granted discharge once completed detox and establishes safety in the community.  The patient will likely need [id number] referral to residential CD treatment due to high risk for relapse and inability to maintain sobriety in the community. Likely discharge to [facility] when bed open.

## 2019-11-25 NOTE — PROGRESS NOTES
Pt's has been experiencing ongoing hypertension. Patient denies shortness of breath, chest pain, dizziness and states he doesn't have a family hx of heart problems but did have a brother who had an MI.       Vitals:    11/25/19 0804 11/25/19 1006 11/25/19 1253 11/25/19 1314   BP: (!) 167/82 (!) 160/84 (!) 173/92 (!) 165/89   BP Location:  Left arm Left arm Left arm   Pulse: 68 86 94 90   Resp: 16 16 16 16   Temp: 97.6  F (36.4  C) 97.6  F (36.4  C)     TempSrc: Oral Oral     SpO2: 97%      Weight:       Height:

## 2019-11-25 NOTE — PROGRESS NOTES
"Brief Medicine Note    Contacted by nursing regarding elevated BP throughout hospital deborah, despite resolution of detox. Per chart review BP running 140's-170's systolic. No previous hx of HTN documented. Pulse stable. Will start patient on low dose amlodipine 5 mg daily. Will follow BP while inpatient and adjust if needed. Further monitoring per outpatient provider on discharge.     Today's vital signs, medications, and nursing notes were reviewed.    BP (!) 165/89 (BP Location: Left arm)   Pulse 90   Temp 97.6  F (36.4  C) (Oral)   Resp 16   Ht 1.702 m (5' 7\")   Wt 64 kg (141 lb)   SpO2 97%   BMI 22.08 kg/m          Radha Henderson PA-C  Hospitalist Service  Pager [phone]      "

## 2019-11-25 NOTE — PROGRESS NOTES
11/24/19 [facility]   Therapeutic Recreation   Type of Intervention structured groups   Activity leisure education   Response Participates, initiates socially appropriate   Hours 1     Pt participated in Therapeutic Recreation group with focus on stress reduction, leisure education, and acquisition of knowledge and skills. Pt was fully engaged and cooperative in group recreational intervention; leisure inventory. Pt participated through the entire duration of the group. Pt discussed many healthy interests that are enjoyable during free time. Showed progress in session goals. Pt mood was calm and was appropriate with interactions.

## 2019-11-26 VITALS
BODY MASS INDEX: 22.13 KG/M2 | HEART RATE: 80 BPM | DIASTOLIC BLOOD PRESSURE: 94 MMHG | HEIGHT: 67 IN | SYSTOLIC BLOOD PRESSURE: 162 MMHG | OXYGEN SATURATION: 100 % | TEMPERATURE: 98 F | RESPIRATION RATE: 16 BRPM | WEIGHT: 141 LBS

## 2019-11-26 PROCEDURE — 25000132 ZZH RX MED GY IP 250 OP 250 PS 637: Performed by: PHYSICIAN ASSISTANT

## 2019-11-26 PROCEDURE — 99238 HOSP IP/OBS DSCHRG MGMT 30/<: CPT | Performed by: PSYCHIATRY & NEUROLOGY

## 2019-11-26 PROCEDURE — 25000132 ZZH RX MED GY IP 250 OP 250 PS 637: Performed by: PSYCHIATRY & NEUROLOGY

## 2019-11-26 RX ORDER — LANOLIN ALCOHOL/MO/W.PET/CERES
3 CREAM (GRAM) TOPICAL
Qty: 30 TABLET | Refills: 0 | Status: SHIPPED | OUTPATIENT
Start: 2019-11-26 | End: 2019-12-26

## 2019-11-26 RX ORDER — AMLODIPINE BESYLATE 5 MG/1
5 TABLET ORAL DAILY
Qty: 30 TABLET | Refills: 0 | Status: SHIPPED | OUTPATIENT
Start: 2019-11-27 | End: 2024-03-03

## 2019-11-26 RX ORDER — FOLIC ACID 1 MG/1
1 TABLET ORAL DAILY
Qty: 30 TABLET | Refills: 0 | Status: SHIPPED | OUTPATIENT
Start: 2019-11-27 | End: 2019-12-27

## 2019-11-26 RX ORDER — LANOLIN ALCOHOL/MO/W.PET/CERES
100 CREAM (GRAM) TOPICAL DAILY
Qty: 30 TABLET | Refills: 0 | Status: SHIPPED | OUTPATIENT
Start: 2019-11-27 | End: 2019-12-27

## 2019-11-26 RX ORDER — MULTIPLE VITAMINS W/ MINERALS TAB 9MG-400MCG
1 TAB ORAL DAILY
Qty: 30 TABLET | Refills: 0 | Status: SHIPPED | OUTPATIENT
Start: 2019-11-27 | End: 2019-12-27

## 2019-11-26 RX ADMIN — MULTIPLE VITAMINS W/ MINERALS TAB 1 TABLET: TAB at 08:25

## 2019-11-26 RX ADMIN — Medication 100 MG: at 08:25

## 2019-11-26 RX ADMIN — FOLIC ACID 1 MG: 1 TABLET ORAL at 08:26

## 2019-11-26 RX ADMIN — NICOTINE 1 PATCH: 21 PATCH, EXTENDED RELEASE TRANSDERMAL at 08:26

## 2019-11-26 RX ADMIN — AMLODIPINE BESYLATE 5 MG: 5 TABLET ORAL at 08:25

## 2019-11-26 ASSESSMENT — ACTIVITIES OF DAILY LIVING (ADL)
ORAL_HYGIENE: INDEPENDENT
LAUNDRY: WITH SUPERVISION
HYGIENE/GROOMING: INDEPENDENT
DRESS: INDEPENDENT

## 2019-11-26 NOTE — PROGRESS NOTES
Pt given copy of their discharge instructions and medication administration instructions. All discharge plans and labs were discussed with patient. Pt reports no questions at this time regarding discharge plans, labs or medications. Pt denies any suicidal ideation, plans or intent at this time. Patient reports he doesn't have access to fire arms, has a safe enviornment he's returning to and identifies spending time with his family as a healthy coping mechanism. Patient has a follow up appointment 11/27/19 with his primary provider which he is aware of.  Patient discharge assisted via [address] the [facility]. Patient plan is to discharge home and then follow up with his primary provider and then follow up with [facility]. Patient is discharged at this time.

## 2019-11-26 NOTE — DISCHARGE SUMMARY
Psychiatric Discharge Summary    Bert Aburto MRN# [id number]   Age: 59 year old YOB: 1960     Date of Admission:  11/21/2019  Date of Discharge:  11/26/2019  Admitting Physician:  Shashank Almeida MD  Discharge Physician:  Shashank Almeida MD         Event Leading to Hospitalization:     Bert Aburto is a 59-year-old  male with history of alcoholism, status post prostatectomy and anxiety, who presented to the Emergency Department seeking admission for alcohol detoxification.  He was at [facility] earlier in the week and completed a CD assessment prior to admission.  He lives with his wife.  He lost his job as an  due to poor functioning secondary to alcohol use and is currently unemployed.  He has had 3 DWIs, most recent 1 was in 2008.  He has never been to CD treatment.  He is prescribed Zoloft and Ambien, but has not been compliant with the Zoloft, which he takes it for anxiety.  He also tells me that he was prescribed 10 mg of Ambien, but had been taking up to 30 mg in the past 2-3 weeks.      The patient smokes more than 1 pack of tobacco per day.  He used marijuana in high school but none since.  He acknowledged that he has been prescribed Ambien for over 10 years, 10 mg at bedtime, but acknowledged that he has been taking up to 30 mg in the past 2-3 weeks.  No over-the-counter medication misuse.  No history of cocaine, heroin or methamphetamine use.  Drug of choice is alcohol.  He started drinking in high school, which became habitual shortly after.  Longest sobriety was only 1 month.  He drinks daily, tells me that about 6-8 beers per day, but at the Emergency Department, he stated that he drinks up to a 12-pack of beer per day.  He has had withdrawals, but no DTs or seizures.  He acknowledged progressive use, use despite negative consequence and lack of control.  Last alcohol use was 2 days ago.  He is interested in door to [id number] referral to  "residential CD treatment, acknowledging that he has difficulty maintaining sobriety in the community and is fearful that he will relapse.      The patient does not have a psychiatrist.  He sees a therapist, Renu Benavides, for individual therapy.  He has known her for about a year, but has not seen her lately.  He has been prescribed Zoloft for a couple for a few months, but has not been compliant with it.  He takes it \"only when I'm anxious at bedtime.\"  He has been prescribed Ambien for about 10 years at 10 mg at bedtime, but acknowledged he has been taking on average 30 mg per night for the past 2-3 weeks.  He has been diagnosed with anxiety.  No prior suicidal attempts or self-harming behavior.  No inpatient hospitalization for mental illness.      Apart from stress and frustration related to heavy alcohol consumption, the patient denied any recent bouts of depression, hopelessness, helplessness, thought of suicide.  He is future oriented.  He contracted for safety in the unit and in the community.  He endorsed intermittent anxiety, which seems to be situational in nature.  He acknowledged limited coping skills.  Some anxiety could be related also may be related to alcohol withdrawal.  His sleep and appetite have been intact.  Energy, motivation, concentration and focus fluctuate.  He reported no history or symptoms related to margie or psychosis.  No history or symptoms related to PTSD or OCD.  He stated that his memory is intact, denied any recent confusion or disorientation.      The patient has a history of prostate cancer, status post prostatectomy in 2018.  No other surgical history.  He tells me that he had a concussion about a year ago after falling while intoxicated.  HE DENIED MEDICAL ALLERGIES.  He describes fatigue and deconditioning secondary to heavy alcohol use lately.      The patient tells me that older sister and younger brother both have bipolar disorder.  No chemical dependency in the family. The " patient grew up in suburbs of [place], was raised by both parents with 7 siblings.  Denied childhood abuse and neglect.  Graduated high school on time, completed college and has a Master's degree in Environmental and Civil Engineering.  He has been  for 30 years.  He has 1 child.  He currently resides with his wife.  Denies legal history.  He is unemployed, lost his job recently.      See Admission note by Shashank Almeida MD on 11/22/2019 for additional details.          Diagnoses:     1.  Alcohol use disorder, severe.   2.  Anxiety, not otherwise specified.   3.  Rule out substance-induced mood disorder.   4.  Hypnotic use disorder.          Labs:     Recent Results (from the past 336 hour(s))   Alcohol breath test POCT    Collection Time: 11/18/19 12:13 PM   Result Value Ref Range    Alcohol Breath Test 0.069 (A) 0.00 - 0.01   Drug abuse screen 6 urine (tox)    Collection Time: 11/18/19  2:50 PM   Result Value Ref Range    Amphetamine Qual Urine Negative NEG^Negative    Barbiturates Qual Urine Negative NEG^Negative    Benzodiazepine Qual Urine Negative NEG^Negative    Cannabinoids Qual Urine Negative NEG^Negative    Cocaine Qual Urine Negative NEG^Negative    Ethanol Qual Urine Positive (A) NEG^Negative    Opiates Qualitative Urine Negative NEG^Negative   Alcohol breath test POCT    Collection Time: 11/21/19  9:45 AM   Result Value Ref Range    Alcohol Breath Test 0.000 0.00 - 0.01   CBC with platelets differential    Collection Time: 11/21/19 10:01 AM   Result Value Ref Range    WBC 9.7 4.0 - 11.0 10e9/L    RBC Count 4.77 4.4 - 5.9 10e12/L    Hemoglobin 15.7 13.3 - 17.7 g/dL    Hematocrit 44.9 40.0 - 53.0 %    MCV 94 78 - 100 fl    MCH 32.9 26.5 - 33.0 pg    MCHC 35.0 31.5 - 36.5 g/dL    RDW 12.0 10.0 - 15.0 %    Platelet Count 405 150 - 450 10e9/L    Diff Method Automated Method     % Neutrophils 71.5 %    % Lymphocytes 15.3 %    % Monocytes 10.9 %    % Eosinophils 1.0 %    % Basophils 0.5 %    % Immature  Granulocytes 0.8 %    Nucleated RBCs 0 0 /100    Absolute Neutrophil 6.9 1.6 - 8.3 10e9/L    Absolute Lymphocytes 1.5 0.8 - 5.3 10e9/L    Absolute Monocytes 1.1 0.0 - 1.3 10e9/L    Absolute Eosinophils 0.1 0.0 - 0.7 10e9/L    Absolute Basophils 0.1 0.0 - 0.2 10e9/L    Abs Immature Granulocytes 0.1 0 - 0.4 10e9/L    Absolute Nucleated RBC 0.0    Comprehensive metabolic panel    Collection Time: 11/21/19 10:01 AM   Result Value Ref Range    Sodium 132 (L) 133 - 144 mmol/L    Potassium 4.8 3.4 - 5.3 mmol/L    Chloride 98 94 - 109 mmol/L    Carbon Dioxide 26 20 - 32 mmol/L    Anion Gap 8 3 - 14 mmol/L    Glucose 100 (H) 70 - 99 mg/dL    Urea Nitrogen 7 7 - 30 mg/dL    Creatinine 0.64 (L) 0.66 - 1.25 mg/dL    GFR Estimate >90 >60 mL/min/[1.73_m2]    GFR Estimate If Black >90 >60 mL/min/[1.73_m2]    Calcium 9.5 8.5 - 10.1 mg/dL    Bilirubin Total 0.3 0.2 - 1.3 mg/dL    Albumin 3.7 3.4 - 5.0 g/dL    Protein Total 7.8 6.8 - 8.8 g/dL    Alkaline Phosphatase 120 40 - 150 U/L    ALT 55 0 - 70 U/L    AST 51 (H) 0 - 45 U/L   Drug abuse screen 6 urine (chem dep)    Collection Time: 11/21/19 10:47 AM   Result Value Ref Range    Amphetamine Qual Urine Negative NEG^Negative    Barbiturates Qual Urine Negative NEG^Negative    Benzodiazepine Qual Urine Negative NEG^Negative    Cannabinoids Qual Urine Negative NEG^Negative    Cocaine Qual Urine Negative NEG^Negative    Ethanol Qual Urine Negative NEG^Negative    Opiates Qualitative Urine Negative NEG^Negative   Sodium    Collection Time: 11/22/19  6:25 AM   Result Value Ref Range    Sodium 133 133 - 144 mmol/L          Consults:   Consult Date:  11/21/2019      HISTORY OF PRESENT ILLNESS:  The patient is a 59-year-old male admitted to [facility] for alcohol abuse and detoxification.  He has been drinking at least a 6-pack of beer daily for the past couple of months; however, breathalyzer on admission was 0.0.  An Internal Medicine consultation was ordered by Dr. Almeida to assess  medical problems including hyponatremia.  At this time, Bert denies acute physical concerns including fever, chills, chest pain, shortness of breath, abdominal pain, nausea, bowel or bladder concerns including melena.      PAST MEDICAL HISTORY:   1.  Psychiatric history per Dr. Almeida.   2.  History of prostate cancer, in remission, status post prostatectomy.   3.  Denies history of other chronic medical problems and surgeries including cardiopulmonary disease, hypertension and diabetes.      ADMISSION MEDICATIONS:  None.      ALLERGIES:  NO KNOWN DRUG ALLERGIES.      SOCIAL HISTORY:   with 1 grown child.  Currently unemployed.  Lives in [place].  Smokes on average 20 cigarettes daily.      FAMILY HISTORY:  Reviewed and noncontributory.      REVIEW OF SYSTEMS:  Ten-point review of systems negative except as stated above in history of present illness.      PHYSICAL EXAMINATION:   GENERAL:  Nontoxic-appearing man in no acute distress.   VITAL SIGNS:  Stable.  Temperature afebrile, pulse 96, blood pressure 160s/70s.   HEENT:  Poor dentition, otherwise negative.   NECK:  Supple.  No cervical lymphadenopathy or thyromegaly.   LUNGS:  Clear.   CARDIOVASCULAR:  Regular rate and rhythm.   ABDOMEN:  Soft, nontender.   EXTREMITIES:  No edema.   SKIN:  No rashes noted on exposed areas.   NEUROLOGIC:  He is awake, alert and oriented x3.  Motor strength symmetric.  He is not tremulous.      LABORATORY DATA:  Sodium level 132, AST 51, otherwise CBC and rest of comprehensive metabolic panel normal.  Urine drug screen negative.  Breathalyzer negative.      ASSESSMENT:   1.  Alcohol abuse and withdrawal per Dr. Almeida.   2.  Mild hyponatremia, question hypovolemic due to poor p.o. water intake prior to admission.   3.  Otherwise, overall in apparent normally good physical health.      PLAN:  Repeat sodium level tomorrow a.m.  No further medical intervention indicated at this time.  Medicine will follow up on results of  sodium level and if trending towards normal or indeed normal, will sign off.  Please feel free to call with questions.      Thank you for this consultation.     RODERICK LYNN PA-C         Hospital Course:   Bert Aburto was admitted to Detox Unit with attending Shashank Almeida MD as a voluntary patient. The patient was placed under status 15 (15 minute checks) to ensure patient safety. The patient completed CD assessment. He was accepted to [facility]. The patient is able to return home while awaiting opening at CD program. Patient  did not require seclusion or administration of emergency medications to manage behavior.    The following medication changes took place:   1.  The patient was placed on alcohol withdrawal protocol including MSSA-Valium p.r.n., multivitamin, folic acid and thiamine.  The patient completed detox uneventfully.   2.  PRN hydroxyzine for anxiety will be offered.   3.  Zoloft was discontinued due to noncompliance. Ambien was discontinued and replaced with melatonin 3 mg as needed for insomnia.     The patient tolerated medications well. Reported mood symptoms resolved. The patient was active on the unit. The patient was social, engaged and attended groups. No overt margie, confusion or psychosis noted. The patient maintained denial of SI, HI and ROSIE. The patient slept well. Appetite was intact. The patient was compliant with medications and care.     Bert Aburto will be released to home. At the time of this encounter, Bert Aburto was determined to not be a danger to himself or others and symptoms did not meet criteria for involuntary hospitalization.  The patient denied depression, anxiety, racing thoughts and irritability. The patient denied hallucinations and paranoia. The patient was future oriented and denied SI, ROSIE and HI. The patient noted tolerating medications well.    Steps taken to minimize risk include: assessing patient s behavior and thought process daily during  hospital stay, discharging patient with adequate plan for follow up for mental and physical health, and discussing safety plan of returning to the hospital or calling 911, should the patient ever has thoughts of harming self or others. Therefore, based on all available evidence including the factors cited above, the patient does not appear to be at imminent risk for self-harm, and is appropriate for outpatient level of care.  The patient agreed to continue medications and outpatient care.     Because this patient meets criteria for an Alcohol Use Disorder, I performed the following brief intervention on the date of this note:   1) Expressed concern that the patient is drinking at unhealthy levels known to increase their risk of alcohol related problems   2) Gave feedback linking alcohol use and health, including personalized feedback explaining how alcohol use can interact with their medical and/or psychiatric problems, and with prescribed medications.   3) Advised patient to abstain.         Discharge Medications:     Current Discharge Medication List      START taking these medications    Details   amLODIPine (NORVASC) 5 MG tablet Take 1 tablet (5 mg) by mouth daily  Qty: 30 tablet, Refills: 0    Associated Diagnoses: Essential hypertension      folic acid (FOLVITE) 1 MG tablet Take 1 tablet (1 mg) by mouth daily  Qty: 30 tablet, Refills: 0    Associated Diagnoses: Alcohol abuse      melatonin 3 MG tablet Take 1 tablet (3 mg) by mouth nightly as needed for sleep  Qty: 30 tablet, Refills: 0    Associated Diagnoses: Psychophysiological insomnia      multivitamin w/minerals (THERA-VIT-M) tablet Take 1 tablet by mouth daily  Qty: 30 tablet, Refills: 0    Associated Diagnoses: Alcohol abuse      vitamin B1 (THIAMINE) 100 MG tablet Take 1 tablet (100 mg) by mouth daily  Qty: 30 tablet, Refills: 0    Associated Diagnoses: Alcohol abuse         STOP taking these medications       sertraline (ZOLOFT) 50 MG tablet Comments:    Reason for Stopping:         zolpidem (AMBIEN) 10 MG tablet Comments:   Reason for Stopping:                    Psychiatric and Physical Examinations:   Appearance:  awake, alert, adequately groomed, appeared as age stated and no apparent distress  Attitude:  cooperative  Eye Contact:  good  Mood:  better and good  Affect:  appropriate and in normal range, intensity is normal, full range and reactive  Speech:  clear, coherent and normal prosody  Psychomotor Behavior:  no evidence of tardive dyskinesia, dystonia, or tics and intact station, gait and muscle tone  Thought Process:  linear and goal oriented  Associations:  no loose associations  Thought Content:  no evidence of suicidal ideation or homicidal ideation and no evidence of psychotic thought  Insight:  fair  Judgment:  intact  Oriented to:  time, person, and place  Attention Span and Concentration:  intact  Recent and Remote Memory:  intact  Language and Fund of Knowledge: appropriate  Muscle Strength and Tone: normal  Gait and Station: Normal  Vitals:    11/25/19 1314 11/25/19 1616 11/25/19 2002 11/26/19 0743   BP: (!) 165/89 (!) 160/85 (!) 159/90 (!) 158/90   BP Location: Left arm  Right arm    Pulse: 90 87 87 91   Resp: 16 16 16 16   Temp:  97.9  F (36.6  C) 98.6  F (37  C) 97.7  F (36.5  C)   TempSrc:  Oral Oral Oral   SpO2:    100%   Weight:       Height:              Discharge Plan:     Disposition: Home with plan to follow-up with admission to [facility]     Treatment Follow-Up:  [address] [place] [address]  [phone]-Coco (LP+ wait list coordinator)  [phone]-Edilson (LP+ admission counselor)     Primary care follow up appointment 11/27/2019 (for high blood pressure)  Time: 11:45 am with Dr. Santa 11/27/19  [facility]  Address: [address], [place] [address]  Phone number: [phone]     Resources:   AA/NA and Sponsors are excellent  resources for support and you can find one at any support group meeting.   Alcoholics Anonymous (www.alcoholics-anonymous.org): for local information 24 hours/day  AA Intergroup service office in [place] (http://www.aastpaul.org/) [phone]  AA Intergroup service office in [facility]: [phone]. (http://www.aamin[place].org/)  Narcotics Anonymous (www.nam[place].org) (629) 779-2191  https://aafairviewriverside.org/meetings  SMART Recovery - self management for addiction recovery:  www.smartrecAthleteTraxy.org  Pathways ~ A Health Crisis Resource & Support Center:  761.489.3073.  https://prescribetoprevent.org/patient-education/videos/  http://www.harmreduction.org  [facility] [phone]  Support Group:  AA/NA and Sponsor/support.  National [place] on Mental Illness (www.[place].sebastian.org): 220.253.8928 or 969-307-2389.  Alcoholics Anonymous (www.alcoholics-anonymous.org): Check your phone book for your local chapter.  Suicide Awareness Voices of Education (SAVE) (www.save.org): 006-243-GHBY (1252)  National Suicide Prevention Line (www.mentalhealthmn.org): 045-878-UZYD (5882)  Mental Health Consumer/Survivor Network of [place] (www.mhcsn.net): 394.941.2972 or 483-685-4461  Mental Health Association of [place] (www.mentalhealth.org): 808.444.3151 or 301-503-8919   Substance Abuse and Mental Health Services (www.samhsa.gov)  [place] Opioid Prevention Coalition: www.opioidcoalition.org     [place] Recovery Connection (MRC)  [facility] connects people seeking recovery to resources that help foster and sustain long-term recovery.  Whether you are seeking resources for treatment, transportation, housing, job training, education, health care or other pathways to recovery, [facility] is a great place to start.  119.305.3142.  Www.[facility].org    Attestation:  The patient has been seen and evaluated by me,  Shashank Almeida MD

## 2019-11-26 NOTE — PROGRESS NOTES
No availability today in LP+, writer received notice from utilization review that pt's insurance will not cover further days in detox. Writer spoke with pt's wife and pt about this. Wife and pt wish for him to be discharged home, they will follow-up with admission from home on Monday 12/2 to see what availability is like after the holiday week/weekend. AVS complete. Pt ready for discharge and reports he can take a Lyft or Uber home.

## 2019-11-26 NOTE — DISCHARGE INSTRUCTIONS
Behavioral Discharge Planning and Instructions  THANK YOU FOR CHOOSING THE [facility]  3A  [phone]    Summary: You were admitted to Station 3A on 11/21/19 for detoxification from alcohol.  A medical exam was performed that included lab work. You have met with a  and opted to attend treatment at [facility].  Please take care and make your recovery a daily priority, Bert! It was a pleasure working with you and the entire treatment team here wishes you the very best in your recovery!     Recommendation:  28 Days Treatment at [facility]    Main Diagnoses:  Per Dr. Shashank Almeida MD:  1.  Alcohol use disorder, severe.   2.  Anxiety, not otherwise specified.   3.  Rule out substance-induced mood disorder.   4.  Hypnotic use disorder.    Major Treatments, Procedures and Findings:  You were treated for alcohol detoxification using valium administered based on the [facility] protocol. You had a chemical dependency assessment. You had labs drawn and those results were reviewed with you. Please take a copy of your lab work with you to your next primary care physician appointment.    Symptoms to Report:  If you experience more anxiety, confusion, sleeplessness, deep sadness or thoughts of suicide, notify your treatment team or notify your primary care physician. IF ANY OF THE SYMPTOMS YOU ARE EXPERIENCING ARE A MEDICAL EMERGENCY CALL 911 IMMEDIATELY.     Lifestyle Adjustment: Adjust your lifestyle to get enough sleep, relaxation, exercise and good nutrition. Continue to develop healthy coping skills to decrease stress and promote a sober living environment. Do not use mood altering substances including alcohol, illegal drugs or addictive medications other than what is currently prescribed.     Disposition: Home with plan to follow-up with [facility] to [facility]    Treatment Follow-Up:  [place] [id number] [facility] [address] [address]  [phone]-Coco (LP+ wait list coordinator)  [phone]-Edilson (LP+ admission counselor)    Primary care follow up appointment 11/27/2019 (for high blood pressure)  Time: 11:45 am with Dr. Santa 11/27/19  [facility]  Address: [address], [place] [address]  Phone number: [phone]    Resources:   AA/NA and Sponsors are excellent resources for support and you can find one at any support group meeting.   Alcoholics Anonymous (www.alcoholics-anonymous.org): for local information 24 hours/day  AA Intergroup service office in [place] (http://www.aastpaul.org/) [phone]  AA Intergroup service office in [facility]: [phone]. (http://www.aaminneaPulse Entertainmentis.org/)  Narcotics Anonymous (www.nam[place].org) (540) 363-9328  https://aafairviewriverside.org/meetings  SMART Recovery - self management for addiction recovery:  www.smartrecovery.org  Pathways ~ A Health Crisis Resource & Support Center:  729.663.8515.  https://prescribetoprevent.org/patient-education/videos/  http://www.harmreduction.org  [facility] 702-720-0153  Support Group:  AA/NA and Sponsor/support.  National [place] on Mental Illness (www.[place].sebastian.org): 327.479.5902 or 639-424-5421.  Alcoholics Anonymous (www.alcoholics-anonymous.org): Check your phone book for your local chapter.  Suicide Awareness Voices of Education (SAVE) (www.save.org): 188-075-TSXD (4590)  National Suicide Prevention Line (www.mentalhealthmn.org): 531-757-PUMB (1917)  Mental Health Consumer/Survivor Network of [place] (www.mhcsn.net): 745.137.4031 or 453-972-0593  Mental Health Association of [place] (www.mentalhealth.org): 800.679.4383 or 885-347-4008   Substance Abuse and Mental Health Services (www.samhsa.gov)  [place] Opioid Prevention Coalition: www.opioidcoalition.org    [place] Recovery Connection (MRC)  MRC connects people seeking recovery to resources that help foster and sustain long-term  recovery.  Whether you are seeking resources for treatment, transportation, housing, job training, education, health care or other pathways to recovery, [facility] is a great place to start.  299.932.4305.  www.[facility].org    General Medication Instructions:   See your medication sheet(s) for instructions.   Take all medications as prescribed.  Make no changes unless your doctor suggests them.   Go to all your doctor visits.  Be sure to have all your required lab tests. This way, your medicines can be refilled on time.  Do not use any forms of alcohol.    Please Note:  If you have any questions at anytime after you are discharged please call the [facility] detox unit 3AW unit at [phone].  [facility] Behavioral Intake [phone]  Medical Records call [phone]  Outpatient Behavioral Intake call [phone]  LP+ Wait List/Bed Availability call [phone]    Please remember to take all of your behavioral discharge planning and lab paperwork to any follow up appointments, it contains your lab results, diagnosis, medication list and discharge recommendations.      THANK YOU FOR CHOOSING THE [facility]

## 2019-12-06 ENCOUNTER — HOSPITAL ENCOUNTER (OUTPATIENT)
Dept: BEHAVIORAL HEALTH | Facility: CLINIC | Age: 59
End: 2019-12-06
Attending: FAMILY MEDICINE
Payer: COMMERCIAL

## 2019-12-06 VITALS
HEART RATE: 88 BPM | SYSTOLIC BLOOD PRESSURE: 157 MMHG | BODY MASS INDEX: 21.97 KG/M2 | DIASTOLIC BLOOD PRESSURE: 100 MMHG | WEIGHT: 140 LBS | HEIGHT: 67 IN

## 2019-12-06 PROBLEM — F19.20 CHEMICAL DEPENDENCY (H): Status: ACTIVE | Noted: 2019-12-06

## 2019-12-06 PROCEDURE — 10020000 ZZH LODGING PLUS FACILITY CHARGE ADULT

## 2019-12-06 PROCEDURE — H2035 A/D TX PROGRAM, PER HOUR: HCPCS | Mod: HQ

## 2019-12-06 PROCEDURE — H2035 A/D TX PROGRAM, PER HOUR: HCPCS

## 2019-12-06 PROCEDURE — 40000007 ZZH STATISTIC ADULT CD FACE TO FACE-NO CHRG

## 2019-12-06 RX ORDER — IBUPROFEN 200 MG
400 TABLET ORAL EVERY 6 HOURS PRN
COMMUNITY
End: 2019-12-29

## 2019-12-06 RX ORDER — AMOXICILLIN 250 MG
2 CAPSULE ORAL DAILY PRN
COMMUNITY
End: 2019-12-29

## 2019-12-06 RX ORDER — MAGNESIUM HYDROXIDE/ALUMINUM HYDROXICE/SIMETHICONE 120; 1200; 1200 MG/30ML; MG/30ML; MG/30ML
30 SUSPENSION ORAL EVERY 6 HOURS PRN
COMMUNITY
End: 2019-12-29

## 2019-12-06 RX ORDER — ACETAMINOPHEN 325 MG/1
325-650 TABLET ORAL EVERY 4 HOURS PRN
COMMUNITY
End: 2019-12-29

## 2019-12-06 RX ORDER — LORATADINE 10 MG/1
10 TABLET ORAL DAILY PRN
COMMUNITY
End: 2019-12-29

## 2019-12-06 ASSESSMENT — PAIN SCALES - GENERAL: PAINLEVEL: MILD PAIN (2)

## 2019-12-06 ASSESSMENT — ANXIETY QUESTIONNAIRES
1. FEELING NERVOUS, ANXIOUS, OR ON EDGE: SEVERAL DAYS
5. BEING SO RESTLESS THAT IT IS HARD TO SIT STILL: NOT AT ALL
GAD7 TOTAL SCORE: 2
7. FEELING AFRAID AS IF SOMETHING AWFUL MIGHT HAPPEN: NOT AT ALL
2. NOT BEING ABLE TO STOP OR CONTROL WORRYING: NOT AT ALL
4. TROUBLE RELAXING: NOT AT ALL
6. BECOMING EASILY ANNOYED OR IRRITABLE: SEVERAL DAYS
3. WORRYING TOO MUCH ABOUT DIFFERENT THINGS: NOT AT ALL

## 2019-12-06 ASSESSMENT — PATIENT HEALTH QUESTIONNAIRE - PHQ9: SUM OF ALL RESPONSES TO PHQ QUESTIONS 1-9: 4

## 2019-12-06 ASSESSMENT — MIFFLIN-ST. JEOR: SCORE: 1398.67

## 2019-12-06 NOTE — PROGRESS NOTES
Name: Bert Aburto  Date: 12/6/2019  Medical Record: [id number]    Envelope Number: [id number]    List of Contents (List each item separately in new row):   Folic Acid 400 mcg  Melatonin 3 mg tabs  B1 Vitamin 100 mg  Melatonin 5 mg tabs  Multivitamin     Admission:  I am responsible for any personal items that are not sent to the safe or pharmacy.  [facility] is not responsible for loss, theft or damage of any property in my possession.      Patient Signature:  ___________________________________________       Date/Time:__________________________    Staff Signature: __________________________________       Date/Time:__________________________    2nd Staff person, if patient is unable/unwilling to sign:      __________________________________________________________       Date/Time: __________________________      Discharge:  [facility] has returned all of my personal belongings:    Patient Signature: ________________________________________     Date/Time: ____________________________________    Staff Signature: ______________________________________     Date/Time:_____________________________________

## 2019-12-06 NOTE — PROGRESS NOTES
Initial Services Plan        Service Initiation Date: 12/6/2019    Immediate health and/or safety concerns: No    Identify health and safety concern(s) below and include plan to address:    None Identified    Treatment suggestions for client during the time between intake (admit date) and completion of the individual treatment plan:     Look for a sober support network, i.e. 12 step, Smart Recovery, Celebrate Recovery, etc  Tour the treatment center or outpatient clinic  Introduce yourself to your treatment group. Spend time getting to know your peers  Review your patient or client handbook  Begin working on your treatment goal list    Completed by: SABINA Carrizales  Date completed: 12/6/2019 at 11:12 AM

## 2019-12-06 NOTE — PROGRESS NOTES
Name: Bert GUNDERSON Deady  Date: 12/6/2019  Medical Record: [id number]    Envelope Number: [id number]    List of Contents (List each item separately in new row):   1 Cell phone  1      Admission:  I am responsible for any personal items that are not sent to the safe or pharmacy.  [facility] is not responsible for loss, theft or damage of any property in my possession.      Patient Signature:  ___________________________________________       Date/Time:__________________________    Staff Signature: __________________________________       Date/Time:__________________________    2nd Staff person, if patient is unable/unwilling to sign:      __________________________________________________________       Date/Time: __________________________      Discharge:  [facility] has returned all of my personal belongings:    Patient Signature: ________________________________________     Date/Time: ____________________________________    Staff Signature: ______________________________________     Date/Time:_____________________________________

## 2019-12-06 NOTE — PROGRESS NOTES
Acknowledgement of Current Treatment Plan - Initial Treatment Plan     INITIAL TREATMENT PLAN:     1. I have participated in creating my treatment plan with my therapist / counselor on _12/6/19_.     I agree with the plan as it is written in the electronic health record.    Name Signature/Date   Patient     Name of Therapist / Counselor Signature/Date   Counselor/Therapist        2. I have completed and reviewed my Safety Plan with my counselor and signed this on _12/6/19_. I have been given the hard copy of this plan.    Patient signature/date:      _____________________________________________________________________________    3. Last Use Date: _11/20/19__    Patient signature/date:     _____________________________________________________________________________

## 2019-12-06 NOTE — PROGRESS NOTES
"[facility] Nursing Health Assessment      Vital signs:     BP (!) 157/100   Pulse 88   Ht 1.702 m (5' 7\")   Wt 63.5 kg (140 lb)   BMI 21.93 kg/m    B/P re-taken by LP RN - 132/72 P - 85    Direct admission    Counselor: Елена  Drug of Choice: Alcohol - Beer primarily  Last use: 11/21/2019  Home clinic/MD: [facility]. [facility]ela - [facility]     Psychiatrist/therapist: Therapist Jesi Benavides (private)    Medical history/current conditions:  Hypertension    H&P Screen:  H&P within the last 90 days: Yes.  Date: 11/26/2019 Location: 3A/detox      Mental Health diagnosis: none  Medication compliant?: na  Recent sucidal thoughts? no     When? na  Current thought of self-harm? no    Plan? na    Pain assessment:   Pt. Experiencing pain at this time?  No      Nursing Assessment Summary:  No acute issues    Do you use tobacco? Yes Type: cigarettes How often?: daiily How much?: 1 pack (20 cigarettes) Are you interested in quitting?: yes, pt given information on tobacco cessation and will consider the nicotine patch therapy     Pt received flu vaccination from PCP    On-going nursing intervention required?   No    Acute care visit recommended: no       "

## 2019-12-06 NOTE — PROGRESS NOTES
"Comprehensive Assessment Summary     Based on client interview, review of previous assessments and   comprehensive assessment interview the following diagnosis and recommendations are:     Patient: Bert Aburto  MRN; [id number]   : 1960  Age: 59 year old Sex: male       Client meets criteria for:  303.90/F10.20, Alcohol Use Disorder, Severe  305.40/F13.10, Sedative, Hypnotic, or Anxiolytic Use Disorder, Mild, Ambien  305.10/F17.200, Tobacco Use Disorder, Moderate    Dimension One: Acute Intoxication/Withdrawal Potential     Ratin  (Consider the client's ability to cope with withdrawal symptoms and current state of intoxication)     Collateral paperwork indicates Pt's detoxification was resolved.    Dimension Two: Biomedical Condition and Complications    Ratin  (Consider the degree to which any physical disorder would interfere with treatment for substance abuse, and the client's ability to tolerate any related discomfort; determine the impact of continued chemical use on the unborn child if the client is pregnant)     Pt denies any medical conditions or complications that would interfere with treatment. Pt has chronic back pain which he states he \"ignores and drinks to treat it.\" Pt has a PCP and a Primary Care Clinic. Pt attends scheduled medical appointments and is able to get needed care. Pt denies taking any medications for physical health. Pt reports he is a daily cigarette smoker and is not inclined to quit at this time.    Dimension Three: Emotional/Behavioral/Cognitive Conditions & Complications    Ratin  (Determine the degree to which any condition or complications are likely to interfere with treatment for substance abuse or with functioning in significant life areas and the likelihood of risk of harm to self or others)     Pt scores on PHQ-9 and SCOTT-7 indicate severe depression and severe anxiety. Pt denies having a mental health diagnosis. Pt is prescribed Ambien and Zoloft and " "reports taking more Ambien than prescribed and taking his Zoloft inconsistently. Pt should be encouraged toward consistent prescription adherence. Pt reports working with a therapist this year but stopped seeing her because he could not afford it. Pt reports a family hx of mental health concerns and states his sister and brother are each on Lithium for Bipolar and that his mother was too before she passed away. Pt denies a hx of trauma or abuse. Pt describes his upbringing as enjoyable. Pt comes from a large family with 8 children of which he is the 2nd youngest. Pt denies any SI/SIB/SAs. Pt was assessed for suicide risk and was assessed as low risk. Pt will continue to be monitored for any change in risk severity rating and will complete a safety plan.    Dimension Four: Treatment Acceptance/Resistance     Ratin  (Consider the amount of support and encouragement necessary to keep the client involved in treatment)     Pt states he has known he has a problem with alcohol \"ever since [he] was alive.\" Pt has continued to drink despite damage to relationships with his wife, daughter, and \"anybody that knows [him],\" a hx of 3 DUIs, financial stress, and possible loss of his marriage and house. Pt states that he drinks whenever he is awake. Pt motivation for treatment appears to be external. Pt drank an hour prior to his CD evaluation. Pt states that he believes AA may be helpful to him in quitting. Pt appears to be in the contemplation stage of change. Pt appears to lack internal motivation to stop using substances.     Dimension Five: Continued Use/Relaspe Prevention     Ratin  (Consider the degree to which the client's recognizes relapse issues and has the skills to prevent relapse of either substance use or mental health problems)     Pt states that he does not try to control his use of alcohol and drinks whenever he is awake. Pt states the only time he has been sober was when his daughter was born 20+ years " ago. Pt denies having ever attended a CD treatment program. Pt reports no previous 12 step or other support meeting attendance. Pt lacks education into the disease of addiction and recovery process. Pt states that he has severe cravings to use alcohol. Pt appears to lack impulse control, sober coping skills, and long-term sober maintenance skills. Pt appears to lack insight into the effects of his use on his physical health, mental health, relationships, and life overall. Pt appears to be at high risk for relapse at this time.    Dimension Six: Recovery Environment     Rating: 3  (Consider the degree to which key areas of the client's life are supportive of or antagonistic to treatment participation and recovery)     Pt is currently unemployed and is not in school. Pt reports some financial stress due to unemployment and fears losing his house. Pt states that most of his friends drink. Pt lives with his wife of 30 years. Pt states that his wife drinks every evening after returning home from work. Pt has one adult daughter who does not live with him. Pt states that his drinking has placed strain on his relationships with his wife and daughter. Pt states that his wife and daughter are supportive toward his recovery, and appears to not recognize that his wife's regular drinking could be problematic. Pt has a hx of legal issues with 3 DUIs, the most recent being in 2008. Pt appears to lack a sober support and an environment conducive to recovery.    I have reviewed the information on the assessment, psychosocial and medical history and checklist and the following changes have been made: Dimension 1 risk rating was changed from 1 to 0 because collateral paperwork indicates that Pt's detoxification has been resolved.

## 2019-12-06 NOTE — PROGRESS NOTES
Patient:  Bert GUNDERSON Deady    Date: 12/6/2019    Comprehensive Assessment UPDATE        Comprehensive Summary Update and Review  Counselor met with patient on 12/6/2019 and reviewed the Comprehensive Assessment.    There were no changes/updates identified by patient or in chart entries.

## 2019-12-06 NOTE — PROGRESS NOTES
Progress Note    This patient had a Rule 25 Assessment on 11/20/2019 completed by Sushma Bryson MA, SABINA .  This patient was seen for a face to face update of the Rule 25 Assessment on 12/6/2019 by SABINA Carrizales.  INSIDE: The patient's Rule 25 Assessment completed on 11/20/2019 is in the patient's electronic medical record in Epic in the Chart Review section under the Notes/Trans Tab.    Alcohol/Drug use since the last CD evaluation (include date of last use):     No additional substances use since the last CD evaluation     Please note any other clinical changes since the last CD evaluation (such as medication changes, additional legal charges, detoxification admissions, overdoses, etc.)     No significant changes since the last CD evaluation       ASAM Dimensions Original scores Current Scores   I.) Intoxication and Withdrawal: 1 1   II.) Biomedical:  1 1   III.) Emotional and Behavioral:  2 2   IV.) Readiness to Change:  2 2   V.) Relapse Potential: 4 4   VI.) Recovery Environmental: 3 3     Please list clinical justifications for the above ASAM score changes since the original comprehensive assessment:     None of the ASAM scores on the six dimensions had changed since the Rule 25 Assessment was completed on 11/20/2019.       Current CECILY: Current UA:     0.000     Positive for Benzodiazepines and negative for all other screened drugs.       PHQ-9, SCOTT-7   PHQ-9 on 12/6/2019 SCOTT-7 on 12/6/2019   The patient's PHQ-9 score was 4 out of 27, indicating minimal depression.   The patient's SCOTT-7 score was 2 out of 21, indicating minimal anxiety.       [place]-Suicide Severity Rating Scale Reassessment   Have you ever wished you were dead or that you could go to sleep and not wake up?  Past Month:  Yes     Have you actually had any thoughts of killing yourself?  Past Month:  No     Have you been thinking about how you might do this?     Past Month:  No   Lifetime:  No   Have you had these thoughts and  had some intention of acting on them?     Past Month:  No   Lifetime:  No   Have you started to work out the details of how to kill yourself?   Past Month:  No   Lifetime:  No   Do you intend to carry out this plan?   No     When you have the thoughts how long do they last?  The patient denied ever having any suicidal thoughts in life.     Are there things - anyone or anything (i.e. family, Confucianism, pain of death) that stopped you from wanting to die or acting on thoughts of suicide?  Does not apply       2008  The [employer] [facility] [employer]  Used with permission by Marycruz Morris, PhD.       Guide to C-SSRS Risk Ratings   NO IDEATION:  with no active thoughts IDEATION: with a wish to die. IDEATION: with active thoughts. Risk Ratings   If Yes No No 0 - Very Low Risk   If NA Yes No 1 - Low Risk   If NA Yes Yes 2 - Low/moderate risk   IDEATION: associated thoughts of methods without intent or plan INTENT: Intent to follow through on suicide PLAN: Plan to follow through on suicide Risk Ratings cont...   If Yes No No 3 - Moderate Risk   If Yes Yes No 4 - High Risk   If Yes Yes Yes 5 - High Risk   The patient's ADDITIONAL RISK FACTORS and lack of PROTECTIVE FACTORS may increase their overall suicide risk ratings.     Additional Risk Factors:    Significant history of having untreated or poorly treated mental health symptoms     A recent loss that was significant to the patient, i.e. loss of job, loss of home, divorce, break-up, etc.     A triggering event(s) leading to humiliation, shame or despair   Protective Factors:    Having people in his/her life that would prevent the patient from considering a suicide attempt (i.e. young children, spouse, parents, etc.)     Having pet(s) that give companionship and/or would prevent the patient from considering a suicide attempt     A positive relationship with his/her clinical medical and/or mental health providers     Having easy access to supportive family  "members     Risk Status   1. - Low Risk: Evaluation Counselors:  Document in Epic / SBAR to counselor \"Low Risk\".      Treatment Counselors:  Reassess upon admission as applicable, assess weekly in progress notes under Dimension 3 and summarize in Discharge / Treatment summary under Dimension 3.     Additional information to support suicide risk rating: There was no additional information to provide at this time.     "

## 2019-12-07 ENCOUNTER — HOSPITAL ENCOUNTER (OUTPATIENT)
Dept: BEHAVIORAL HEALTH | Facility: CLINIC | Age: 59
End: 2019-12-07
Attending: FAMILY MEDICINE
Payer: COMMERCIAL

## 2019-12-07 PROCEDURE — 10020000 ZZH LODGING PLUS FACILITY CHARGE ADULT

## 2019-12-07 PROCEDURE — H2035 A/D TX PROGRAM, PER HOUR: HCPCS | Mod: HQ

## 2019-12-07 ASSESSMENT — ANXIETY QUESTIONNAIRES: GAD7 TOTAL SCORE: 2

## 2019-12-08 ENCOUNTER — HOSPITAL ENCOUNTER (OUTPATIENT)
Dept: BEHAVIORAL HEALTH | Facility: CLINIC | Age: 59
End: 2019-12-08
Attending: FAMILY MEDICINE
Payer: COMMERCIAL

## 2019-12-08 PROCEDURE — 10020000 ZZH LODGING PLUS FACILITY CHARGE ADULT

## 2019-12-08 PROCEDURE — H2035 A/D TX PROGRAM, PER HOUR: HCPCS | Mod: HQ

## 2019-12-09 ENCOUNTER — HOSPITAL ENCOUNTER (OUTPATIENT)
Dept: BEHAVIORAL HEALTH | Facility: CLINIC | Age: 59
End: 2019-12-09
Attending: FAMILY MEDICINE
Payer: COMMERCIAL

## 2019-12-09 PROCEDURE — G0177 OPPS/PHP; TRAIN & EDUC SERV: HCPCS

## 2019-12-09 PROCEDURE — H2035 A/D TX PROGRAM, PER HOUR: HCPCS

## 2019-12-09 PROCEDURE — H2035 A/D TX PROGRAM, PER HOUR: HCPCS | Mod: HQ

## 2019-12-09 PROCEDURE — 10020000 ZZH LODGING PLUS FACILITY CHARGE ADULT

## 2019-12-10 ENCOUNTER — HOSPITAL ENCOUNTER (OUTPATIENT)
Dept: BEHAVIORAL HEALTH | Facility: CLINIC | Age: 59
End: 2019-12-10
Attending: FAMILY MEDICINE
Payer: COMMERCIAL

## 2019-12-10 PROCEDURE — 10020000 ZZH LODGING PLUS FACILITY CHARGE ADULT

## 2019-12-10 PROCEDURE — G0177 OPPS/PHP; TRAIN & EDUC SERV: HCPCS

## 2019-12-10 PROCEDURE — H2035 A/D TX PROGRAM, PER HOUR: HCPCS | Mod: HQ

## 2019-12-10 PROCEDURE — H2035 A/D TX PROGRAM, PER HOUR: HCPCS

## 2019-12-10 NOTE — PROGRESS NOTES
Patient:  Bert GUNDERSON Deady            Adult CD Progress Note and Treatment Plan Review     Attendance  Please refer to OP BEH CD Adult Attendance Record Documentation Flowsheet    Support group attended this week: Yes    Reporting sobriety:  Yes    Treatment Plan     Treatment Plan Review competed on: 12/10/19       Client preferred learning style:   Hands On  Verbal  Visual  Demonstration    Staff Members contributing : Jasson Biswas [facility], Adán Tam [facility], Marcos Garcia [facility] Intern, Josefa Rivera [facility]                      Received Supervision: No    Client contributed to goals and plans..    Client received copy of plan/revised plan: Yes    Client agrees with plan/revised plan: Yes    Changes to Treatment Plan: None    New Goals added since last review: No as this is patients first note.    Goals worked on since last review: Motivation, Change, Relapse Prevention, Relationships, Moment of Truth, Blind-Sided by DOC, First Step, Anxiety    Strategies effective: Yes    Strategies need these changes: No changes needed at this time.    1) Care Coordination Activities:  Patient will work counselors and  to secure as aftercare treatment program.[id number]  2) Medical, Mental Health and other appointments the client attended:  3) Medication issues: None at this time  4) Physical and mental health problems: Noneatthistime  5) Any changes in Vulnerable Adult Status? No  6) Review and evaluation of the individual abuse prevention plan: Current IAAP for this program is adequate for this client.      ASAM Risk Rating:    Dimension 1-0 Patient reports no withdrawal symptomology at this time.      Dimension 2-1 Patient has committed in Treatment Plan to maintain medication compliance. Patient reports no biomedical issues or concerns that would prohibit him from completing [facility] program.      Dimension 3-2 Patient is gaining awareness regarding how his addiction affects his mental and emotional well-being as  "evidenced by his ability to communicate about the aforementioned in a group setting. Patient is working on his \"Anxiety and Recovery From Chemical Dependency\" assignment and will present in group when complete. Patient will be scheduled to meet with staff psychotherapist to address his mental and emotional concerns. Patient reports no suicidal ideation at this time.       Dimension 4-2 Patient his continuing to work on his internal motivation for change. Patient has been showing up to meetings and lectures on time and has been an active member in his group by participating and offering meaningful feedback to his peers. Patient completed his \"First Step\" assignment and presented in group. Patient is working on his \"Drug Use History\" assignment and will present in group when complete.      Dimension 5-4 Patient is gaining awareness regarding his triggers, cues and early warning signs for relapse. Patient is working on his \"Addictive Disease\" assignment and will present in group when complete.        Dimension 6-4 Patient reports that he attended 5 12 Step meetings this week according to his treatment plan. Patient will work towards obtaining a sponsor while in treatment at [facility]. Patient will work with counselors and  to develop an aftercare treatment protocol.      Guide to Risk Ratings for Suicidality:   IDEATION: Active thoughts of suicide? INTENT: Intent to follow on suicide? PLAN: Plan to follow through on suicide? Level of Risk:   IF Yes Yes Yes Patient = High Emergent   IF Yes Yes No Patient = High Urgent/Non-Emergent   IF Yes No No Patient = Moderate Non-Urgent   IF No No   No Patient = Low Risk   The patient's ADDITIONAL RISK FACTORS and lack of PROTECTIVE FACTORS may increase their overall suicide risk ratings.     Patient's/client's current risk rating: Low Risk    Family Involvement:   Patient signed appropriate KINGA'S    Data:   Patient offered feedback, did actively " participate    Intervention:   Aftercare Planning  Behavior Modification  Cognitive Behavior Therapy  Counselor Feedback  Education  Emotional Management  Group Feedback  Relapse Prevention  Twelve Step Facilitation  Mental Health Education    Assessment:   Stages of Change Model  Contemplation      Appears/Sounds:  Cooperative  Motivated  Engaged    Plan:  Continue Group Therapy  Continue Treatment Plan Objectives.    SABINA Palacio

## 2019-12-11 ENCOUNTER — HOSPITAL ENCOUNTER (OUTPATIENT)
Dept: BEHAVIORAL HEALTH | Facility: CLINIC | Age: 59
End: 2019-12-11
Attending: FAMILY MEDICINE
Payer: COMMERCIAL

## 2019-12-11 VITALS — HEART RATE: 90 BPM | DIASTOLIC BLOOD PRESSURE: 73 MMHG | SYSTOLIC BLOOD PRESSURE: 134 MMHG

## 2019-12-11 PROCEDURE — H2035 A/D TX PROGRAM, PER HOUR: HCPCS | Mod: HQ

## 2019-12-11 PROCEDURE — 10020000 ZZH LODGING PLUS FACILITY CHARGE ADULT

## 2019-12-12 ENCOUNTER — HOSPITAL ENCOUNTER (OUTPATIENT)
Dept: BEHAVIORAL HEALTH | Facility: CLINIC | Age: 59
End: 2019-12-12
Attending: FAMILY MEDICINE
Payer: COMMERCIAL

## 2019-12-12 PROCEDURE — H2035 A/D TX PROGRAM, PER HOUR: HCPCS

## 2019-12-12 PROCEDURE — H2035 A/D TX PROGRAM, PER HOUR: HCPCS | Mod: HQ

## 2019-12-12 PROCEDURE — 10020000 ZZH LODGING PLUS FACILITY CHARGE ADULT

## 2019-12-12 PROCEDURE — G0177 OPPS/PHP; TRAIN & EDUC SERV: HCPCS

## 2019-12-13 ENCOUNTER — HOSPITAL ENCOUNTER (OUTPATIENT)
Dept: BEHAVIORAL HEALTH | Facility: CLINIC | Age: 59
End: 2019-12-13
Attending: FAMILY MEDICINE
Payer: COMMERCIAL

## 2019-12-13 PROCEDURE — H2035 A/D TX PROGRAM, PER HOUR: HCPCS | Mod: HQ

## 2019-12-13 PROCEDURE — 10020000 ZZH LODGING PLUS FACILITY CHARGE ADULT

## 2019-12-14 ENCOUNTER — HOSPITAL ENCOUNTER (OUTPATIENT)
Dept: BEHAVIORAL HEALTH | Facility: CLINIC | Age: 59
End: 2019-12-14
Attending: FAMILY MEDICINE
Payer: COMMERCIAL

## 2019-12-14 PROCEDURE — H2035 A/D TX PROGRAM, PER HOUR: HCPCS | Mod: HQ

## 2019-12-14 PROCEDURE — 10020000 ZZH LODGING PLUS FACILITY CHARGE ADULT

## 2019-12-14 NOTE — PROGRESS NOTES
"Patient:  Bert GUNDERSON Deady            Adult CD Progress Note and Treatment Plan Review     Attendance  Please refer to OP BEH CD Adult Attendance Record Documentation Flowsheet    Support group attended this week: Yes    Reporting sobriety:  Yes    Treatment Plan     Treatment Plan Review competed on: 12/14/19       Client preferred learning style:   Hands On  Verbal  Visual  Demonstration    Staff Members contributing : Jasson Biswas [facility], Adán Tam [facility], Marcos Garcia, [facility] Intern, Josefa Rivera [facility]                      Received Supervision: No    Client contributed to goals and plans..    Client received copy of plan/revised plan: Yes    Client agrees with plan/revised plan: Yes    Changes to Treatment Plan: None    New Goals added since last review: None    Goals worked on since last review: Motivation, Change, Relapse Prevention, Relationships, Moment of Truth, Blind-Sided by DOC, Voices of Addiction, Drug Use History  Strategies effective: Yes    Strategies need these changes: No changes needed at this time.    1) Care Coordination Activities:  Patient will work counselors and  to secure as aftercare treatment program.e3dd  2) Medical, Mental Health and other appointments the client attended:  3) Medication issues: None at this time  4) Physical and mental health problems: Noneatthistime  5) Any changes in Vulnerable Adult Status? No  6) Review and evaluation of the individual abuse prevention plan: Current IAAP for this program is adequate for this client.      ASAM Risk Rating:    Dimension 1-0 Patient report no issues in this area.      Dimension 2-1 Patient reports no biomedical issues or concerns st this time.      Dimension 3-2 Patient continues to work diligently in this area to gain a deeper understanding of the connection between his use and mental and emotional issues.Patient reports no suicidal ideation at this time. Patient completed his \"Anxiety and Recovery From Chemical Dependency\" " "assignment and presented in group. Patient reports no suicidal ideation at this time.      Dimension 4-2 Patient's internal motivation for change has improved each week. Patient has taken this treatment experience much more seriously than he did the last time at [facility].  Patient completed his \"Drug Use History\" assignment and presented in group. Patient is working on his   \"5 Years Sober vs 5 Years Using\" assignment and will present in group when complete.      Dimension 5-4 Patient continues to work on his triggers, cues and early warning signs for relapse.Patient completed his \"Addictive Disease\" assignment and will presented in group. Patient is working on his \"The Dry Drunk\" assignment and will present in group when complete.        Dimension 6-4 Patient reports that he attended 5 12 Step meetings in compliance with his treatment plan. Patient will work towards obtaining a sponsor while in treatment at [facility]. Patient will continue to  work with counselors and  to develop an aftercare treatment protocol.      Guide to Risk Ratings for Suicidality:   IDEATION: Active thoughts of suicide? INTENT: Intent to follow on suicide? PLAN: Plan to follow through on suicide? Level of Risk:   IF Yes Yes Yes Patient = High Emergent   IF Yes Yes No Patient = High Urgent/Non-Emergent   IF Yes No No Patient = Moderate Non-Urgent   IF No No   No Patient = Low Risk   The patient's ADDITIONAL RISK FACTORS and lack of PROTECTIVE FACTORS may increase their overall suicide risk ratings.     Patient's/client's current risk rating: Low Risk    Family Involvement:   Patient signed appropriate KINGA'S    Data:   Patient offered feedback, did actively participate    Intervention:   Aftercare Planning  Behavior Modification  Cognitive Behavior Therapy  Counselor Feedback  Education  Emotional Management  Group Feedback  Relapse Prevention  Twelve Step Facilitation  Mental Health Education    Assessment:   Stages of " Change Model  Contemplation      Appears/Sounds:  Cooperative  Motivated  Engaged    Plan:  Continue Group Therapy  Continue Treatment Plan Objectives.    SABINA Palacio

## 2019-12-15 ENCOUNTER — HOSPITAL ENCOUNTER (OUTPATIENT)
Dept: BEHAVIORAL HEALTH | Facility: CLINIC | Age: 59
End: 2019-12-15
Attending: FAMILY MEDICINE
Payer: COMMERCIAL

## 2019-12-15 PROCEDURE — 10020000 ZZH LODGING PLUS FACILITY CHARGE ADULT

## 2019-12-15 PROCEDURE — H2035 A/D TX PROGRAM, PER HOUR: HCPCS | Mod: HQ

## 2019-12-15 NOTE — PROGRESS NOTES
Name: Bert Aburto  Date: 12/15/2019  Medical Record: [id number]    Envelope Number: [id number]    List of Contents (List each item separately in new row):   Melatonin 3 mg- bottle open; Melatonin 5 mg open; Vitamin B-1 bottle open    Admission:  I am responsible for any personal items that are not sent to the safe or pharmacy.  [facility] is not responsible for loss, theft or damage of any property in my possession.      Patient Signature:  ___________________________________________       Date/Time:__________________________    Staff Signature: __________________________________       Date/Time:__________________________    2nd Staff person, if patient is unable/unwilling to sign:      __________________________________________________________       Date/Time: __________________________      Discharge:  [facility] has returned all of my personal belongings:    Patient Signature: ________________________________________     Date/Time: ____________________________________    Staff Signature: ______________________________________     Date/Time:_____________________________________

## 2019-12-16 ENCOUNTER — HOSPITAL ENCOUNTER (OUTPATIENT)
Dept: BEHAVIORAL HEALTH | Facility: CLINIC | Age: 59
End: 2019-12-16
Attending: FAMILY MEDICINE
Payer: COMMERCIAL

## 2019-12-16 PROCEDURE — 10020000 ZZH LODGING PLUS FACILITY CHARGE ADULT

## 2019-12-16 PROCEDURE — H2035 A/D TX PROGRAM, PER HOUR: HCPCS | Mod: HQ

## 2019-12-16 PROCEDURE — H2035 A/D TX PROGRAM, PER HOUR: HCPCS

## 2019-12-16 PROCEDURE — G0177 OPPS/PHP; TRAIN & EDUC SERV: HCPCS

## 2019-12-17 ENCOUNTER — HOSPITAL ENCOUNTER (OUTPATIENT)
Dept: BEHAVIORAL HEALTH | Facility: CLINIC | Age: 59
End: 2019-12-17
Attending: FAMILY MEDICINE
Payer: COMMERCIAL

## 2019-12-17 PROCEDURE — H2035 A/D TX PROGRAM, PER HOUR: HCPCS

## 2019-12-17 PROCEDURE — 10020000 ZZH LODGING PLUS FACILITY CHARGE ADULT

## 2019-12-17 PROCEDURE — G0177 OPPS/PHP; TRAIN & EDUC SERV: HCPCS

## 2019-12-17 PROCEDURE — H2035 A/D TX PROGRAM, PER HOUR: HCPCS | Mod: HQ

## 2019-12-18 ENCOUNTER — HOSPITAL ENCOUNTER (OUTPATIENT)
Dept: BEHAVIORAL HEALTH | Facility: CLINIC | Age: 59
End: 2019-12-18
Attending: FAMILY MEDICINE
Payer: COMMERCIAL

## 2019-12-18 PROCEDURE — 10020000 ZZH LODGING PLUS FACILITY CHARGE ADULT

## 2019-12-18 PROCEDURE — H2035 A/D TX PROGRAM, PER HOUR: HCPCS | Mod: HQ

## 2019-12-19 ENCOUNTER — HOSPITAL ENCOUNTER (OUTPATIENT)
Dept: BEHAVIORAL HEALTH | Facility: CLINIC | Age: 59
End: 2019-12-19
Attending: FAMILY MEDICINE
Payer: COMMERCIAL

## 2019-12-19 PROCEDURE — G0177 OPPS/PHP; TRAIN & EDUC SERV: HCPCS

## 2019-12-19 PROCEDURE — 10020000 ZZH LODGING PLUS FACILITY CHARGE ADULT

## 2019-12-19 PROCEDURE — H2035 A/D TX PROGRAM, PER HOUR: HCPCS | Mod: HQ

## 2019-12-19 PROCEDURE — H2035 A/D TX PROGRAM, PER HOUR: HCPCS

## 2019-12-20 ENCOUNTER — HOSPITAL ENCOUNTER (OUTPATIENT)
Dept: BEHAVIORAL HEALTH | Facility: CLINIC | Age: 59
End: 2019-12-20
Attending: FAMILY MEDICINE
Payer: COMMERCIAL

## 2019-12-20 PROCEDURE — H2035 A/D TX PROGRAM, PER HOUR: HCPCS | Mod: HQ

## 2019-12-20 PROCEDURE — 10020000 ZZH LODGING PLUS FACILITY CHARGE ADULT

## 2019-12-21 ENCOUNTER — HOSPITAL ENCOUNTER (OUTPATIENT)
Dept: BEHAVIORAL HEALTH | Facility: CLINIC | Age: 59
End: 2019-12-21
Attending: FAMILY MEDICINE
Payer: COMMERCIAL

## 2019-12-21 PROCEDURE — G0177 OPPS/PHP; TRAIN & EDUC SERV: HCPCS

## 2019-12-21 PROCEDURE — H2035 A/D TX PROGRAM, PER HOUR: HCPCS

## 2019-12-21 PROCEDURE — 10020000 ZZH LODGING PLUS FACILITY CHARGE ADULT

## 2019-12-22 ENCOUNTER — HOSPITAL ENCOUNTER (OUTPATIENT)
Dept: BEHAVIORAL HEALTH | Facility: CLINIC | Age: 59
End: 2019-12-22
Attending: FAMILY MEDICINE
Payer: COMMERCIAL

## 2019-12-22 PROCEDURE — 10020000 ZZH LODGING PLUS FACILITY CHARGE ADULT

## 2019-12-22 PROCEDURE — G0177 OPPS/PHP; TRAIN & EDUC SERV: HCPCS

## 2019-12-22 PROCEDURE — H2035 A/D TX PROGRAM, PER HOUR: HCPCS

## 2019-12-22 NOTE — PROGRESS NOTES
Patient:  Bert GUNDERSON Deady            Adult CD Progress Note and Treatment Plan Review     Attendance  Please refer to OP BEH CD Adult Attendance Record Documentation Flowsheet    Support group attended this week: yes    Reporting sobriety:  yes    Treatment Plan     Treatment Plan Review competed on: 12-22-19       Client preferred learning style: Visual  Hands on  Verbal    Staff Members contributing SABINA Isaacs, SABINA Bhardwaj, SABINA Palacio.                     Received Supervision: No    Client: contributed to goals and plan.    Client received copy of plan/revised plan: Yes    Client agrees with plan/revised plan: Yes        Changes to Treatment Plan: No    New Goals added since last review None needed.    Goals worked on since last review See ASAM progress notes below.    Strategies effective: yes    Strategies need these changes: None needed.    1) Care Coordination Activities:  None.  2) Medical, Mental Health and other appointments the client attended: None.  3) Medication issues: None.  4) Physical and mental health problems: Pt.reports he has no issues in this area.  5) Any changes in Vulnerable Adult Status?  No If yes, add to treatment plan and individual abuse prevention plan.  6) Review and evaluation of the individual abuse prevention plan: Current IAPP for this program is adequate for this client          ASAM Risk Rating:    Dimension 1 0 No change.    Dimension 2 1 No change.    Dimension 3 2 Pt.reports he has had no suicidal ideation. Pt.has gained insight into how his drinking affects his anxiety and over mental health.Pt.is seeing the staff psychotherapist.    Dimension 4 2 Pt.has been attending all groups and lectures. He has been able to talk about how his addiction has progressed and take ownership of the consequences of his use.He continues to work on increasing his internal motivation to change his life style to maintain sobriety. Pt.has completed his drug history and  first step assignments.    Dimension 5 4 Pt.attended the relapse prevention workshop. He was asked to identify his relapse warning signs and triggers in the areas of people,places,activities and feelings. Pt.will need to continue to work in this area as he goes thru treatment.  Pt.has been able to understand the disease of addiction and what he needs to change to maintain recovery.    Dimension 6 3 Pt.has been attending 5 12 step meetings per week while in treatment. He needs to obtain a sponsor. He will be attending Phase 2 aftercare here at [facility]. Pt's wife has been coming to visiting days.      Guide to Risk Ratings for Suicidality:   IDEATION: Active thoughts of suicide? INTENT: Intent to follow on suicide? PLAN: Plan to follow through on suicide? Level of Risk:   IF Yes Yes Yes Patient = High Emergent   IF Yes Yes No Patient = High Urgent/Non-Emergent   IF Yes No No Patient = Moderate Non-Urgent   IF No No   No Patient = Low Risk   The patient's ADDITIONAL RISK FACTORS and lack of PROTECTIVE FACTORS may increase their overall suicide risk ratings.     Patient's/client's current risk rating:  Low Risk    Family Involvement:   KINGA signed    Data:   offered feedback good insight client did actively participate      Intervention:   Aftercare planning  Behavior modification  Counselor feedback  Education  Emotional management  Group feedback  Relapse prevention  Twelve Step facilitation  Mental health education      Assessment:   Stages of Change Model  Action    Appears/Sounds:  Cooperative  Motivated  Engaged      Plan:  Continue group therapy.      SABINA Smith

## 2019-12-23 ENCOUNTER — HOSPITAL ENCOUNTER (OUTPATIENT)
Dept: BEHAVIORAL HEALTH | Facility: CLINIC | Age: 59
End: 2019-12-23
Attending: FAMILY MEDICINE
Payer: COMMERCIAL

## 2019-12-23 PROCEDURE — H2035 A/D TX PROGRAM, PER HOUR: HCPCS

## 2019-12-23 PROCEDURE — 10020000 ZZH LODGING PLUS FACILITY CHARGE ADULT

## 2019-12-23 PROCEDURE — H2035 A/D TX PROGRAM, PER HOUR: HCPCS | Mod: HQ

## 2019-12-24 ENCOUNTER — HOSPITAL ENCOUNTER (OUTPATIENT)
Dept: BEHAVIORAL HEALTH | Facility: CLINIC | Age: 59
End: 2019-12-24
Attending: FAMILY MEDICINE
Payer: COMMERCIAL

## 2019-12-24 PROCEDURE — H2035 A/D TX PROGRAM, PER HOUR: HCPCS | Mod: HQ

## 2019-12-24 PROCEDURE — 10020000 ZZH LODGING PLUS FACILITY CHARGE ADULT

## 2019-12-25 ENCOUNTER — HOSPITAL ENCOUNTER (OUTPATIENT)
Dept: BEHAVIORAL HEALTH | Facility: CLINIC | Age: 59
End: 2019-12-25
Attending: FAMILY MEDICINE
Payer: COMMERCIAL

## 2019-12-25 PROCEDURE — H2035 A/D TX PROGRAM, PER HOUR: HCPCS | Mod: HQ

## 2019-12-25 PROCEDURE — 10020000 ZZH LODGING PLUS FACILITY CHARGE ADULT

## 2019-12-26 ENCOUNTER — HOSPITAL ENCOUNTER (OUTPATIENT)
Dept: BEHAVIORAL HEALTH | Facility: CLINIC | Age: 59
End: 2019-12-26
Attending: FAMILY MEDICINE
Payer: COMMERCIAL

## 2019-12-26 PROCEDURE — H2035 A/D TX PROGRAM, PER HOUR: HCPCS

## 2019-12-26 PROCEDURE — H2035 A/D TX PROGRAM, PER HOUR: HCPCS | Mod: HQ

## 2019-12-26 PROCEDURE — 10020000 ZZH LODGING PLUS FACILITY CHARGE ADULT

## 2019-12-26 NOTE — ADDENDUM NOTE
Encounter addended by: Teresa Correa on: 12/26/2019 8:25 AM   Actions taken: Charge Capture section accepted

## 2019-12-27 ENCOUNTER — HOSPITAL ENCOUNTER (OUTPATIENT)
Dept: BEHAVIORAL HEALTH | Facility: CLINIC | Age: 59
End: 2019-12-27
Attending: FAMILY MEDICINE
Payer: COMMERCIAL

## 2019-12-27 PROCEDURE — H2035 A/D TX PROGRAM, PER HOUR: HCPCS | Mod: HQ

## 2019-12-27 PROCEDURE — 10020000 ZZH LODGING PLUS FACILITY CHARGE ADULT

## 2019-12-28 ENCOUNTER — HOSPITAL ENCOUNTER (OUTPATIENT)
Dept: BEHAVIORAL HEALTH | Facility: CLINIC | Age: 59
End: 2019-12-28
Attending: FAMILY MEDICINE
Payer: COMMERCIAL

## 2019-12-28 PROCEDURE — 10020000 ZZH LODGING PLUS FACILITY CHARGE ADULT

## 2019-12-28 PROCEDURE — H2035 A/D TX PROGRAM, PER HOUR: HCPCS

## 2019-12-28 PROCEDURE — H2035 A/D TX PROGRAM, PER HOUR: HCPCS | Mod: HQ

## 2019-12-29 ENCOUNTER — HOSPITAL ENCOUNTER (OUTPATIENT)
Dept: BEHAVIORAL HEALTH | Facility: CLINIC | Age: 59
End: 2019-12-29
Attending: FAMILY MEDICINE
Payer: COMMERCIAL

## 2019-12-29 PROCEDURE — H2035 A/D TX PROGRAM, PER HOUR: HCPCS | Mod: HQ

## 2019-12-29 PROCEDURE — 10020000 ZZH LODGING PLUS FACILITY CHARGE ADULT

## 2019-12-29 NOTE — PROGRESS NOTES
Nursing Discharge Planning Meeting    Writer completed discharge planning meeting with patient. Discharge is planned for 1/3/20    Discussed appropriate follow up care to manage ISAIAH, MI and medical and to obtain medication refills. Patient given a copy of their current medications for reference. Questions were answered at this time and the patient verbalized an understanding of the post-discharge follow up plan.    Patient to schedule an appointment with their PCP Dr. Gadela - [facility]   Continue to support patient in discharge planning as needed to assure appropriate continuity of care.     Tobacco Cessation  Patient participated in the nicotine replacement therapy for tobacco cessation or reduction during their treatment programming: No, pt uses cigarettes daily and is not interested in quitting at this time, education and cessation options were offered.     The patient was provided with community resources for follow-up to continue tobacco cessation support once in the community. Also the patient was encoruaged to discuss their tobacco cessation efforts with the primary care provider.

## 2019-12-29 NOTE — IP AVS SNAPSHOT
MRN:[id number]                      After Visit Summary   12/29/2019    Bert Aburto    MRN: [id number]           Visit Information        Provider Department      12/29/2019  7:15 AM ADULT [facility]        Your next 10 appointments already scheduled    Dec 30, 2019  7:15 AM CST  Treatment with ADULT [facility] B  [facility] ([facility]) [address] [place] [id number]  [phone]      Dec 31, 2019  7:15 AM CST  Treatment with ADULT LODGING PL[facility] B  [facility] ([facility]) [address] [place] [id number]  [phone]      Jan 01, 2020  7:15 AM CST  Treatment with ADULT LODGING PLUS B  [facility] ([facility]) [address] [place] [id number]  [phone]      Jan 02, 2020  7:15 AM CST  Treatment with ADULT LODGING PLUS B  [facility] ([facility]) [address] [place] [phone]  [phone]         Care EveryWhere ID    This is your Care EveryWhere ID. This could be used by other organizations to access your [facility] medical records  [id number]       Equal Access to Services    CAROLINE LY AH: Sascha parko Sopitaali, waaxda luqadaha, qaybta kaalmada adeegyada, hollie ken. So [facility] [phone].    ATENCIÓN: Si habla español, tiene a moore disposición servicios gratuitos de asistencia lingüística. Llame al [phone].    We comply with applicable federal and state civil rights laws, including the [place] Human Rights Act. We do not discriminate on the basis of race, color, creed, Scientologist, national origin, marital status, age, disability, sex, sexual orientation, or gender  identity.

## 2019-12-29 NOTE — IP AVS SNAPSHOT
Medication List       Patient:  LEONA VALE   :  1960   Physician:  Chela Bramblia           This is your record.  Keep this with you and show to your community pharmacist(s) and physician(s) at each visit.     Allergies:  No Known Allergies          Medications  Valid as of: 2019 - 10:28 AM    Generic Name Brand Name Tablet Size Instructions for use    amLODIPine Besylate NORVASC 5 MG Take 1 tablet (5 mg) by mouth daily        Folic Acid FOLVITE 1 MG Take 1 tablet (1 mg) by mouth daily        Melatonin melatonin 3 MG Take 1 tablet (3 mg) by mouth nightly as needed for sleep        Multiple Vitamins-Minerals THERA-VIT-M  Take 1 tablet by mouth daily        Thiamine HCl THIAMINE 100 MG Take 1 tablet (100 mg) by mouth daily        .           .           .           .

## 2019-12-30 ENCOUNTER — HOSPITAL ENCOUNTER (OUTPATIENT)
Dept: BEHAVIORAL HEALTH | Facility: CLINIC | Age: 59
End: 2019-12-30
Attending: FAMILY MEDICINE
Payer: COMMERCIAL

## 2019-12-30 PROCEDURE — H2035 A/D TX PROGRAM, PER HOUR: HCPCS

## 2019-12-30 PROCEDURE — H2035 A/D TX PROGRAM, PER HOUR: HCPCS | Mod: HQ

## 2019-12-30 PROCEDURE — 10020000 ZZH LODGING PLUS FACILITY CHARGE ADULT

## 2019-12-31 ENCOUNTER — HOSPITAL ENCOUNTER (OUTPATIENT)
Dept: BEHAVIORAL HEALTH | Facility: CLINIC | Age: 59
End: 2019-12-31
Attending: FAMILY MEDICINE
Payer: COMMERCIAL

## 2019-12-31 PROCEDURE — 10020000 ZZH LODGING PLUS FACILITY CHARGE ADULT

## 2019-12-31 PROCEDURE — H2035 A/D TX PROGRAM, PER HOUR: HCPCS

## 2019-12-31 PROCEDURE — H2035 A/D TX PROGRAM, PER HOUR: HCPCS | Mod: HQ

## 2019-12-31 NOTE — PROGRESS NOTES
Patient:  Bert A Deady            Adult CD Progress Note and Treatment Plan Review     Attendance  Please refer to OP BEH CD Adult Attendance Record Documentation Flowsheet    Support group attended this week: yes    Reporting sobriety:  yes    Treatment Plan     Treatment Plan Review competed on: 12/30/19       Client preferred learning style: Visual  Hands on  Demonstration    Staff Members contributing: Jasson Biswas [facility]; Adán Tam [facility]; Josefa Rivera [facility]; Marcos Garcia [facility] Intern                         Received Supervision: No    Client: contributed to goals and plan.    Client received copy of plan/revised plan: Yes    Client agrees with plan/revised plan: Yes    Changes to Treatment Plan: No    New Goals added since last review: None.     Goals worked on since last review: building sober support, relapse prevention, spiritual care, relationship skills, understanding the impact substance use has had on self, understanding the difference between recovery and the dry drunk symdrome    Strategies effective: yes    Strategies need these changes: Continue working on above goals where needed.     1) Care Coordination Activities:  Pt has met with counseling staff to discuss aftercare planning and pt will be going to Phase II following LP.  2) Medical, Mental Health and other appointments the client attended: Pt met with the LP nurse for his discharge planning meeting.   3) Medication issues: None.   4) Physical and mental health problems: None.  5) Any changes in Vulnerable Adult Status?  No If yes, add to treatment plan and individual abuse prevention plan.  6) Review and evaluation of the individual abuse prevention plan: Current IAPP for this program is adequate for this client    ASAM Risk Rating:    Dimension 1 0 No problems in this area.     Dimension 2 1 No changes.     Dimension 3 2 Pt continues to manage his mental health symptoms while in LP. Pt denies any suicidal ideation and his mood appears  "stable. Pt attended and participated in the weekend workshop on relationship skills.     Dimension 4 1 Pt has been actively participating in all aspects of his tx. Pt continues to work on increasing his internal motivation for long-term recovery. Pt attended and participated in the spiritual care group facilitated by Vania Paz and supervised by SABINA Riley. Pt gained understanding of the impact his substance use has had on him by completing and presenting in group the worksheet on denial and also his \"Goodbye Letter to Alcohol.\" Pt continues to work on his spirituality assignment and will present it in group once it is complete.    Dimension 5 4 Pt gained understanding of the difference between recovery and the dry drunk syndrome by reading the pamphlet \"The Dry Drunk Syndrome\" and writing and presenting in group a two page reflection paper on it. Pt identified mistaken beliefs about relapse by reading the booklet \"Mistaken Beliefs About Relapse\" and identifying the beliefs he has had that are mistaken. Pt continues to work on his other relapse prevention assignments and will present them in group once they are complete.     Dimension 6 Pt has been working to build his sober support network by attending multiple 12-step meetings on a weekly basis while in LP. Pt has met with counseling staff and his plans for after LP is to attend Phase II aftercare.     Guide to Risk Ratings for Suicidality:   IDEATION: Active thoughts of suicide? INTENT: Intent to follow on suicide? PLAN: Plan to follow through on suicide? Level of Risk:   IF Yes Yes Yes Patient = High Emergent   IF Yes Yes No Patient = High Urgent/Non-Emergent   IF Yes No No Patient = Moderate Non-Urgent   IF No No   No Patient = Low Risk   The patient's ADDITIONAL RISK FACTORS and lack of PROTECTIVE FACTORS may increase their overall suicide risk ratings.     Patient's/client's current risk rating:  Low Risk    Family Involvement:   none schedule this " week    Data:   offered feedback good insight client did actively participate    Intervention:   Aftercare planning  Cognitive Behavioral Therapy  Counselor feedback  Education  Group feedback  Relapse prevention    Assessment:   Stages of Change Model  Preparation/Determination    Appears/Sounds:  Cooperative  Motivated  Engaged    Plan:  Next treatment task referral out  Focus on recovery environment  Monitor emotional/physical health      Adán Tam, [facility]

## 2020-01-01 ENCOUNTER — HOSPITAL ENCOUNTER (OUTPATIENT)
Dept: BEHAVIORAL HEALTH | Facility: CLINIC | Age: 60
End: 2020-01-01
Attending: FAMILY MEDICINE
Payer: COMMERCIAL

## 2020-01-01 PROCEDURE — 10020000 ZZH LODGING PLUS FACILITY CHARGE ADULT

## 2020-01-01 PROCEDURE — H2035 A/D TX PROGRAM, PER HOUR: HCPCS | Mod: HQ

## 2020-01-01 NOTE — PROGRESS NOTES
[facility]    Patient:  Bert Aburto    MRN: [id number]  : 1960 Age: 59 year old Sex: male   -   Focus of Treatment / Discharge Recommendations   Personal Safety/ Management of Symptoms   * Follow your safety plan. Report increased symptoms to your care team and /or go to the nearest Emergency Department.   * Call crisis lines as needed   [facility] [place] [phone] [facility] [phone]   [facility] [phone] Crisis Connection [phone]   [facility] [phone] [facility] [phone]   [facility] [phone] National Suicide Prevention [phone]   [facility] [phone] Suicide Prevention [phone]   [facility] [phone]   Abstinence/Relapse Prevention   * Take all medicines as directed. Carry a current list of medicines with you.   * Use coping skills: Use peer support group,continue to attend 12 step meetings.Obtain and work with sponsor.   * Do not use illicit (street) drugs, controlled substances (narcotics) or alcohol.   Develop/Improve Independent Living/Socialization Skills: Continue to build relationship with family and sober support network.  Community Resources/Supports and Discharge Planning: Attend three 12 step meetings per week.Obtain sponsor.Attend Phase 2 on Thurs at 5:30pm   Follow up with psychiatrist / main caregiver: DIONISIO Next visit: TBD   Follow up with your therapist: N/A Next visit: N/A   Go to group therapy and / or support groups at: Phase 2 and 12 step meetings by your home.   See your medical doctor about: N/A   Other:   Client Signature:_______________________ Date / Time:___________   Staff Signature:________________________ Date / Time:___________

## 2020-01-01 NOTE — PROGRESS NOTES
[address] [place] [address]          Bert Aburto, 1960, was admitted for evaluation/treatment of chemical dependency at [facility].  This person took part in these program(s):    ______ The Inpatient Program   ______ The Outpatient Program   ___x___ The [facility]   ______ [facility]       Date admitted: 12-6-19  Date discharged: 1-3-20    Type of discharge:   ___x___ Satisfactory - completed evaluation / treatment   ______ Discharged without completing   ______ Behavioral discharge   ______ Transferred to another chemical dependency program   ______ Transferred to another type of service   ______ Left against medical advice (AMA) / Eloped       Comments:       Counselor: SABINA Smith                       Date: 1/1/2020             Time: 12:43 PM

## 2020-01-02 ENCOUNTER — HOSPITAL ENCOUNTER (OUTPATIENT)
Dept: BEHAVIORAL HEALTH | Facility: CLINIC | Age: 60
End: 2020-01-02
Attending: FAMILY MEDICINE
Payer: COMMERCIAL

## 2020-01-02 PROCEDURE — H2035 A/D TX PROGRAM, PER HOUR: HCPCS

## 2020-01-02 PROCEDURE — H2035 A/D TX PROGRAM, PER HOUR: HCPCS | Mod: HQ

## 2020-01-02 PROCEDURE — 10020000 ZZH LODGING PLUS FACILITY CHARGE ADULT

## 2020-01-09 ENCOUNTER — HOSPITAL ENCOUNTER (OUTPATIENT)
Dept: BEHAVIORAL HEALTH | Facility: CLINIC | Age: 60
End: 2020-01-09
Attending: FAMILY MEDICINE
Payer: COMMERCIAL

## 2020-01-09 PROCEDURE — H2035 A/D TX PROGRAM, PER HOUR: HCPCS | Mod: HQ

## 2020-01-09 NOTE — PROGRESS NOTES
CHEMICAL DEPENDENCY TRANSITION SUMMARY      EVALUATION COUNSELOR:  Sushma Bryson MA, [facility].   TREATMENT COUNSELORS:  LUX Wakefield, [facility] and LUX Bhardwaj, [facility].   REFERRAL SOURCE:  [facility]'s Adult Chemical Dependency Evaluation Department and the Inpatient Detox Unit.   PROGRAM:  Adult Chemical Dependency [facility].   ADMISSION DATE:  12/06/2019.   DISCHARGE DATE:  01/03/2020.   LAST SEEN DATE:  01/02/2020.   ADMISSION DIAGNOSES:   1.  305.10/F10.20, Alcohol use disorder, severe.   2.  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   3.  305.10/F17.20, Tobacco use disorder, moderate.   DISCHARGE DIAGNOSES:     1.  305.10/F10.20, Alcohol use disorder, severe.   2.  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   3.  305.10/F17.20, Tobacco use disorder, moderate.   DISCHARGE STATUS:  Bert Aburto completed the [facility] Program with staff approval.   LAST USE DATE:  Per patient report and according to the electronic medical record, Bert's last use date was 11/20/2019.   DAYS OF TREATMENT COMPLETED:  28.      PRESENTING INFORMATION:  Bert came to the [facility] for a chemical dependency assessment.  He reported that his drinking had become bad and he was at the point where he has not been functioning for the last few months.  According to collateral information, his wife reported that he was drinking between 16-20 beers per day.  The day after his chemical dependency assessment in which he was recommended to enter into a residential treatment program, he came to the [facility]'s Emergency Department seeking admission to detox.  He was admitted to detox and transferred to [facility] once medically stable.      SERVICES PROVIDED:  Our services included assessment, treatment planning and education regarding chemical dependency, mental  "illness and relapse prevention.  The patient also participated in individual and group therapy, recovery oriented workshops, spiritual care counseling, recovery skills training and aftercare planning.      ISSUES ADDRESSED IN TREATMENT:   DIMENSION 1:  Acute Intoxication/Withdrawal Potential:  Risk level at the time of admission was a 0; current risk level is a 0.  Bert did not have any problems or issues in this area and was stabilized medically on the inpatient detox unit prior to his admission to [facility].      DIMENSION 2:  Biomedical Conditions and Complaints:  Risk level at the time of admission was a 1; current risk level is a 1.  Bert had reported inconsistent medication adherence in the past.  While in treatment, he adhered to his medication prescription plan and took all his medications as prescribed and followed recommendations of his physician.  Bert had issues with back pain.  While in treatment, he was able to manage his back pain effectively by following recommendations of his physician and was instructed to follow up with his medical provider.      DIMENSION 3:  Emotional/Behavioral/Cognitive Conditions and Complications:  Risk level at the time of admission was a 2; current risk level is a 1.  When Bert entered [facility], he was assessed for symptoms of anxiety and depression and was found to have severe symptoms of these.  While in treatment, he gained understanding of the relationship between his addiction and mental health issues by reading the packet \"Anxiety and Recovery from Chemical Dependency\"  and writing a two page reflection paper on what he learned.  He shared this in the group session.  Additionally, for him to manage his mental health, he saw the staff psychotherapist on a weekly basis for one-on-one sessions to address his mental health issues.  Bert's suicide risk rating at the time of his admission to [facility] was low/no current risk and his discharge rating continues to " "be the same.  While in treatment, he completed a safety plan template.      DIMENSION 4:  Readiness to Change:  Risk level at the time of admission was a 2; current risk level is a 1.  Bert has a history of continuing to use despite negative consequences such as loss of employment, financial strain and relationship difficulties.  While in treatment, he completed and presented in group a first step assignment.  This assignment allowed for him to accept his powerlessness over his alcoholism by identifying areas of powerlessness directly related to his addiction.  Bert had never been in treatment before and had not been educated about the progressive nature of addiction.  He was able to gain understanding of addiction and its consequences and that they are progressive and get worse over time.  He did this by completing and presenting in group a chemical use history, progression and consequences assignment.  Bert had continued to drink despite increasing consequences and a possible loss of his marriage and home.  The goal while he was in treatment was to explore what he will further lose if he continues to drink.  He was able to achieve this goal by completing and presenting in group a \"5 and 5\" assignment in which he wrote two pages on what the next five years of his life would look like if he remains sober and two pages on what his life would look like in five years if he returns to drinking.  Bert has a history of issues of denial regarding his drinking.  This is evidenced by his minimization of the quantities and frequency that he reported of his use.  While in treatment, he was able to gain understanding of the impact his substance use has had on him by completing the worksheet \"Denial\" and writing a goodbye letter to his drug of choice alcohol.  He shared these in the group session.  Bert did not identify with a sense of spirituality that related to his recovery.  While in treatment, he was able to begin to " "connect with spirituality by attending the spiritual care counseling groups on a weekly basis, by reading the packet \"Toward Spirituality,\" and writing a two page reflection paper on the reading and what he can use out of it in his recovery.  He shared this in the group session.      DIMENSION 5:  Relapse, Continued Use, Continued Problem Potential:  Risk level at the time of admission was a 4;  current risk level is a 3.  Bert had never attempted treatment in the past and had not been educated about the disease of addiction.  He was able to gain understanding of the disease of addiction and what is necessary for recovery by reading the packet \"The Addictive Disease\" and writing a two page reflection paper on what the disease of addiction is, what is necessary for recovery and how this differs from his previous beliefs.  He shared this in the group session.  Bert had never received education about what recovery is.  He gained understanding of the difference between recovery and the dry drunk syndrome by reading the pamphlet \"The Dry Drunk Syndrome\" and writing a two page reflection paper on what he learned and how he can apply to his recovery.  He shared this in the group session.  Bert had never received education about issues of relapse.  He was able to identify his mistaken beliefs about relapse and to learn the truth about the nature of relapse by reading the booklet \"Mistaken Beliefs About Relapse\" and writing a two page reflection paper on what mistaken beliefs he has had in the past and why they are mistaken and sharing it in the group session.  Bert has had conflict in his relationships that may increase his risk for relapse.  While in treatment, he started to work on improving relationships with himself and others.  To do this, he read the pamphlet \"Getting Reconnected\" and wrote a two page reflection paper on his current significant relationships and how the reading applied to them and shared this in the " "group session.  Bert did not recognize his relapse triggers and warning signs.  While in [facility], he began to be able to identify these and to build a recovery plan by completing and presenting in group a relapse prevention plan entitled \"Putting It All Together.\"      DIMENSION 6:  Recovery Environment:  Risk level at the time of admission was a 3; current risk level is a 2.  When Bert entered [facility] he lacked a sober support network.  While in treatment, he began to develop this and build it by attending a minimum of five 12-step meetings per week while in [facility] and starting the process of looking for a sponsor.  Bert was in need of an aftercare plan.  He was able to begin to build a plan for aftercare by working with counseling staff to discuss options for outpatient and aftercare and decided upon doing the Phase II Aftercare Program.      STRENGTHS:  Bert maintained a positive attitude while he was in treatment and many times exhibited leadership qualities.  He was active participating in the group settings and was always open for feedback from his peers.  He also gave thoughtful, insightful and moody feedback to others during the group sessions.  Bert appears motivated for recovery at this time and willing to incorporate positive changes into his life.      PROGNOSIS:  The prognosis for Bert is favorable.  This prognosis can be maintained if he follows through with the continuing care recommendations below.      LIVING ARRANGEMENTS AT DISCHARGE:  Bert is discharged to his home where he resides with his wife.      CONTINUING CARE RECOMMENDATIONS AND REFERRALS:   1.  Abstain from all mood-altering chemicals except those prescribed as non-addictive.   2.  Attendance at a minimum of three 12-step meetings per week.   3.  Obtain a permanent male sponsor and maintain regular contact with him.   4.  Admit immediately to the Phase II aftercare program and complete program.   5.  Monitor and comply with " advice of doctor regarding mental health and physical health issues.  Take all medications as prescribed.   6.  Continue to invest in building a sober support network.   7.  Continue to monitor and gain understanding of relapse triggers and stressors through the use and development of healthy coping skills.         This information has been disclosed to you from records protected by Federal confidentiality rules (42 CFR part 2). The Federal rules prohibit you from making any further disclosure of this information unless further disclosure is expressly permitted by the written consent of the person to whom it pertains or as otherwise permitted by 42 CFR part 2. A general authorization for the release of medical or other information is NOT sufficient for this purpose. The Federal rules restrict any use of the information to criminally investigate or prosecute any alcohol or drug abuse patient.      LUX AVILA, [facility]             D: 2020   T: 2020   MT:       Name:     LEONA VALE   MRN:      [id number]        Account:      [id number]   :      1960           Visit Date:   2020      Document: [id number]

## 2020-01-15 NOTE — PROGRESS NOTES
Patient:  Bert Aburto            Adult CD Progress Note and Treatment Plan Review     Attendance  Please refer to OP [facility] CD Adult Attendance Record Documentation Flowsheet    Support group attended this week: yes    Reporting sobriety:  yes    Treatment Plan     Treatment Plan Review competed on:    1/9/20    Client preferred learning style: Visual; Hands on; Verbal; Demonstration     Staff Members contributing: SABINA Bhardwaj                    Received Supervision: No    Client: contributed to goals and plan.    Client received copy of plan/revised plan: Yes    Client agrees with plan/revised plan: Yes    Changes to Treatment Plan: No    New Goals added since last review: None    Goals worked on since last review: completion of LP primary tx, returning home and adjusting to being there, spending time with wife, building sober support, mental health management     Strategies effective: yes    Strategies need these changes: Continue working on above goals.     ASAM Risk Rating:    Dimension 1 0 No problems.    Dimension 2 0 No problems.    Dimension 3 1 Pt has a diagnosis of anxiety and depression and has been following recommendations of his physician and remaining medication compliant. Pt denies any suicidal ideation and his mood appears stable.     Dimension 4 1 Pt continues to identify the negative consequences of his chemical use. Pt denies any major cravings this week.     Dimension 5 3 Pt continues to build and is beginning to utilize coping skills to combat relapse triggers and cues and avoid relapse.     Dimension 6 2 Pt reported that he attended six 12-step meetings this week, but that he does not yet have a sponsor.  and has a sponsor whom he is in regular contact with. Pt is living at home with his wife.     Any changes in Vulnerable Adult Status?  No  If yes, add to treatment plan and individual abuse prevention plan.    Family Involvement:   N/A    Data:   This is the pt's first Phase II  session. Pt stated that he is feeling confident, happy, and peaceful this evening. Pt shared that he went to dinner and a movie with his wife and his wife's best friend who is in town. Pt talked about that he has been getting along with his wife since returning home. Pt reported that he has been going to the part with his dog.     Intervention:   Staff facilitated group and pt actively participated and offered fellow peers feedback.    Assessment:   Pt appears to be adjusting well to being home following tx.     Plan:  Focus on recovery environment  Monitor emotional/physical health      SABINA Mosley

## 2020-01-16 ENCOUNTER — HOSPITAL ENCOUNTER (OUTPATIENT)
Dept: BEHAVIORAL HEALTH | Facility: CLINIC | Age: 60
End: 2020-01-16
Attending: FAMILY MEDICINE
Payer: COMMERCIAL

## 2020-01-16 PROCEDURE — 10020000 ZZH LODGING PLUS FACILITY CHARGE ADULT

## 2020-01-16 PROCEDURE — H2035 A/D TX PROGRAM, PER HOUR: HCPCS | Mod: HQ

## 2020-01-18 NOTE — PROGRESS NOTES
Patient:  Bert Aburto            Adult CD Progress Note and Treatment Plan Review     Attendance  Please refer to [facility] CD Adult Attendance Record Documentation Flowsheet    Support group attended this week: yes    Reporting sobriety:  yes    Treatment Plan     Treatment Plan Review competed on:    1/16/20    Client preferred learning style: Visual; Hands on; Verbal; Demonstration     Staff Members contributing: SABINA Bhardwaj                    Received Supervision: No    Client: contributed to goals and plan.    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     ASAM Risk Rating:    Dimension 1 0 No problems.    Dimension 2 0 No problems.    Dimension 3 1 Pt has a diagnosis of anxiety and depression and has been following recommendations of his physician and remaining medication compliant. Pt denies any suicidal ideation and his mood appears stable.     Dimension 4 1 Pt continues to identify the negative consequences of his chemical use. Pt denies any major cravings this week. Pt appears to be continuing to increase his internal motivation for long-term recovery.    Dimension 5 3 Pt continues to build and is beginning to utilize coping skills to combat relapse triggers and cues and avoid relapse. Pt is starting to implement his relapse prevention plan.     Dimension 6 2 Pt reported that he attended five 12-step meetings this week, but that he does not yet have a sponsor, although he has a prospective one.  Pt is living at home with his wife. Pt reported he  is working on getting his volunteering set up.    Any changes in Vulnerable Adult Status?  No  If yes, add to treatment plan and individual abuse prevention plan.    Family Involvement:   N/A    Data:   Pt stated that he is feeling confident, courageous, and glad this week. Pt shared that he went to a neurologist on Monday for testing for memory, aptitude, and a neurological work up. Pt reported that he has a therapist appt set up for next week. 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     Intervention:   Staff facilitated group and pt actively participated and offered fellow peers feedback.    Assessment:   Pt appears to be working hard to get the things in his life back in order.     Plan:  Focus on recovery environment  Monitor emotional/physical health      SABINA Mosley

## 2020-01-23 ENCOUNTER — HOSPITAL ENCOUNTER (OUTPATIENT)
Dept: BEHAVIORAL HEALTH | Facility: CLINIC | Age: 60
End: 2020-01-23
Attending: FAMILY MEDICINE
Payer: COMMERCIAL

## 2020-01-23 PROCEDURE — H2035 A/D TX PROGRAM, PER HOUR: HCPCS | Mod: HQ

## 2020-01-25 NOTE — PROGRESS NOTES
Patient:  Bert Aburto            Adult CD Progress Note and Treatment Plan Review     Attendance  Please refer to [facility] CD Adult Attendance Record Documentation Flowsheet    Support group attended this week: yes    Reporting sobriety:  yes    Treatment Plan     Treatment Plan Review competed on:    1/23/20    Client preferred learning style: Visual; Hands on; Verbal; Demonstration     Staff Members contributing: SABINA Bhardwaj                    Received Supervision: No    Client: contributed to goals and plan.    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     ASAM Risk Rating:    Dimension 1 0 No problems.    Dimension 2 0 No problems.    Dimension 3 1 Pt has a diagnosis of anxiety and depression and has been following recommendations of his physician and remaining medication compliant. Pt denies any suicidal ideation and his mood appears stable.     Dimension 4 1 Pt continues to identify the negative consequences of his chemical use. Pt denies any major cravings this week. Pt appears to be continuing to increase his internal motivation for long-term recovery.    Dimension 5 3 Pt continues to build and to utilize coping skills to combat relapse triggers and cues and avoid relapse. Pt appears to be implementing his relapse prevention plan and making positive changes in his attitudes, thoughts, and behaviors to support recovery.     Dimension 6 2 Pt reported that he attended five 12-step meetings this week and that he does have a sponsor whom he is  in contact with.  Pt is living at home with his wife. 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    Any changes in Vulnerable Adult Status?  No  If yes, add to treatment plan and individual abuse prevention plan.    Family Involvement:   N/A    Data:   Pt stated that he is feeling confident, glad, and safe this evening. Pt shared that he has an interview set up for his volunteer position and hopes to get that going soon. Pt also talked about thing at home and that they are going well and he has been enjoying spending time with his dog.     Intervention:   Staff facilitated group and pt actively participated and offered fellow peers feedback.    Assessment:   Pt appears to be doing well this evening and seems content.     Plan:  Focus on recovery environment  Monitor emotional/physical health      SABINA Mosley

## 2020-01-30 ENCOUNTER — HOSPITAL ENCOUNTER (OUTPATIENT)
Dept: BEHAVIORAL HEALTH | Facility: CLINIC | Age: 60
End: 2020-01-30
Attending: SOCIAL WORKER
Payer: COMMERCIAL

## 2020-01-30 PROCEDURE — H2035 A/D TX PROGRAM, PER HOUR: HCPCS | Mod: HQ

## 2020-01-30 PROCEDURE — 10020000 ZZH LODGING PLUS FACILITY CHARGE ADULT

## 2020-02-07 NOTE — PROGRESS NOTES
Patient:  Bert Aburto            Adult CD Progress Note and Treatment Plan Review     Attendance  Please refer to [facility] CD Adult Attendance Record Documentation Flowsheet    Support group attended this week: yes    Reporting sobriety:  yes    Treatment Plan     Treatment Plan Review competed on:    1/30/20    Client preferred learning style: Visual; Hands on; Verbal; Demonstration     Staff Members contributing: SABINA Bhardwaj                    Received Supervision: No    Client: contributed to goals and plan.    Client received copy of plan/revised plan: Yes    Client agrees with plan/revised plan: Yes    Changes to Treatment Plan: No    New Goals added since last review: None    Goals worked on since last review: spending time with wife and reconnecting with her relationally, building sober support, mental health management, trying to find a volunteer position, physical health management, sober fun, two months sober    Strategies effective: yes    Strategies need these changes: Continue working on above goals.     ASAM Risk Rating:    Dimension 1 0 No problems.    Dimension 2 0 No problems.    Dimension 3 1 Pt has a diagnosis of anxiety and depression and has been following recommendations of his physician and remaining medication compliant. Pt denies any suicidal ideation and his mood appears stable.     Dimension 4 0 Pt continues to identify the negative consequences of his chemical use. Pt denies any major cravings this week. Pt appears to be continuing to increase his internal motivation for long-term recovery. Pt reports two months of sobriety.     Dimension 5 3 Pt continues to build and to utilize coping skills to combat relapse triggers and cues and avoid relapse. Pt appears to be implementing his relapse prevention plan and making positive changes in his attitudes, thoughts, and behaviors to support recovery.     Dimension 6 2 Pt reported that he attended five 12-step meetings this week and that  he does have a sponsor whom he is in contact with.  Pt is living at home with his wife. Pt reported he is continuing to look for volunteer opportunities. 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    Any changes in Vulnerable Adult Status?  No  If yes, add to treatment plan and individual abuse prevention plan.    Family Involvement:   N/A    Data:   Pt stated that he is feeling confident, courageous, peaceful, and a little anxious this evening. Pt shared that he got together with an old friend and co-worker and shared with him that he went to tx and is an alcoholic. Pt also talked about that he went out to dinner with his wife and had some good conversations. 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     Intervention:   Staff facilitated group and pt actively participated and offered fellow peers feedback.    Assessment:   Pt appears to be a little disappointed about not getting his volunteer position, but seems to be doing well otherwise.     Plan:  Focus on recovery environment  Monitor emotional/physical health      SABINA Mosley

## 2020-02-07 NOTE — ADDENDUM NOTE
Encounter addended [facility]: [facility] on: 2/7/2020 2:07 PM   Actions taken: Pend clinical note, Clinical Note Signed

## 2020-02-13 ENCOUNTER — HOSPITAL ENCOUNTER (OUTPATIENT)
Dept: BEHAVIORAL HEALTH | Facility: CLINIC | Age: 60
End: 2020-02-13
Attending: SOCIAL WORKER
Payer: COMMERCIAL

## 2020-02-13 PROCEDURE — H2035 A/D TX PROGRAM, PER HOUR: HCPCS | Mod: HQ

## 2020-02-15 NOTE — PROGRESS NOTES
Patient:  Bert Aburto            Adult CD Progress Note and Treatment Plan Review     Attendance  Please refer to OP [facility] CD Adult Attendance Record Documentation Flowsheet    Support group attended this week: yes    Reporting sobriety:  yes    Treatment Plan     Treatment Plan Review competed on:    2/13/20    Client preferred learning style: Visual; Hands on; Verbal; Demonstration     Staff Members contributing: SABINA Bhardwaj                    Received Supervision: No    Client: contributed to goals and plan.    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     ASAM Risk Rating:    Dimension 1 0 No problems.    Dimension 2 0 No problems.    Dimension 3 1 Pt has a diagnosis of anxiety and depression and has been following recommendations of his physician and remaining medication compliant. Pt denies any suicidal ideation and his mood appears stable.     Dimension 4 0 Pt continues to identify the negative consequences of his chemical use. Pt denies any major cravings this week. Pt appears to be continuing to increase his internal motivation for long-term recovery.     Dimension 5 2 Pt continues to build and to utilize coping skills to combat relapse triggers and cues and avoid relapse. Pt appears to be implementing his relapse prevention plan and making positive changes in his attitudes, thoughts, and behaviors to support recovery.     Dimension 6 2 Pt reported that he attended four 12-step meetings this week and  that he does have a sponsor whom he is in contact with.  Pt is living at home with his wife. Pt reported he attended continuing education courses for his professional license last week and has applied for his license. Pt talked about starting to look for employment.     Any changes in Vulnerable Adult Status?  No  If yes, add to treatment plan and individual abuse prevention plan.    Family Involvement:   N/A    Data:   Pt stated that he is feeling glad, confident, peaceful, and happy this evening. Pt shared that he went to his continuing education classes last week and has already filled out the application to get his professional license back. Pt also talked about the feelers he has put out for potential jobs. Pt talked about needing to work on his contact list for his employment, but also just for social reasons as well.     Intervention:   Staff facilitated group and pt actively participated and offered fellow peers feedback.    Assessment:   Pt appears to be doing well this week and seems to be stable and on the right track in his sobriety.     Plan:  Focus on recovery environment  Monitor emotional/physical health      SABINA Mosley

## 2020-02-20 ENCOUNTER — HOSPITAL ENCOUNTER (OUTPATIENT)
Dept: BEHAVIORAL HEALTH | Facility: CLINIC | Age: 60
End: 2020-02-20
Attending: SOCIAL WORKER
Payer: COMMERCIAL

## 2020-02-20 PROCEDURE — H2035 A/D TX PROGRAM, PER HOUR: HCPCS | Mod: HQ

## 2020-02-27 ENCOUNTER — HOSPITAL ENCOUNTER (OUTPATIENT)
Dept: BEHAVIORAL HEALTH | Facility: CLINIC | Age: 60
End: 2020-02-27
Attending: SOCIAL WORKER
Payer: COMMERCIAL

## 2020-02-27 PROCEDURE — H2035 A/D TX PROGRAM, PER HOUR: HCPCS | Mod: HQ

## 2020-03-05 ENCOUNTER — HOSPITAL ENCOUNTER (OUTPATIENT)
Dept: BEHAVIORAL HEALTH | Facility: CLINIC | Age: 60
End: 2020-03-05
Attending: SOCIAL WORKER
Payer: COMMERCIAL

## 2020-03-05 PROCEDURE — H2035 A/D TX PROGRAM, PER HOUR: HCPCS | Mod: HQ

## 2020-03-12 ENCOUNTER — HOSPITAL ENCOUNTER (OUTPATIENT)
Dept: BEHAVIORAL HEALTH | Facility: CLINIC | Age: 60
End: 2020-03-12
Attending: SOCIAL WORKER
Payer: COMMERCIAL

## 2020-03-12 PROCEDURE — H2035 A/D TX PROGRAM, PER HOUR: HCPCS | Mod: HQ

## 2020-03-26 ENCOUNTER — HOSPITAL ENCOUNTER (OUTPATIENT)
Dept: BEHAVIORAL HEALTH | Facility: CLINIC | Age: 60
End: 2020-03-26
Attending: SOCIAL WORKER
Payer: COMMERCIAL

## 2020-03-26 PROCEDURE — H2035 A/D TX PROGRAM, PER HOUR: HCPCS | Mod: TEL

## 2020-03-26 NOTE — PROGRESS NOTES
"Bert Aburto is a 59 year old male who is being evaluated via a billable telephone visit.      The patient has been notified of following:     \"This telephone visit will be conducted via a call between you and your clinician. We have found that certain health care needs can be provided without the need for a physical exam.  This service lets us provide the care you need with a phone conversation.\"     Writer reached out to pt via telephone. Pt informed W he is still sober and is attending meetings regularly. W inquired about pt's relationship with his sponsor, and pt stated they have been meeting on a regular weekly basis and are attending meetings together and working through the big book of alcoholics anonymous. Pt stated that current COVID-19 challenges have affected some of the meetings he likes to attend but that he is continuing to attend meetings and is feeling well-supported. W and Pt reviewed importance of sober connections and sponsorship. Pt talked about importance of breaking isolation during these challenging times. 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 W encouraged Pt to stay focused on making positive contributions to others and continuing to make connections in his life because the COVID-19 restrictions can be a challenge to a healthy sober lifestyle. W encouraged Pt to reach out if encountering any challenges throughout the week.     Assessment/Plan:  Pt appears to be doing well and states he is maintaining sobriety and continuing to use sober coping skills and long-term sober maintenance skills. Pt appears to be well-supported by sober peers. W will continue to work with Pt via telephone on a weekly basis until physical groups or an alternative can be held.      I have reviewed the note as documented above.  This accurately captures the substance of my conversation with the patient.      Phone call contact time  Call " Started at 3:38pm  Call Ended at 3:49pm    SABINA Bobby

## 2020-04-09 ENCOUNTER — HOSPITAL ENCOUNTER (OUTPATIENT)
Dept: BEHAVIORAL HEALTH | Facility: CLINIC | Age: 60
End: 2020-04-09
Attending: SOCIAL WORKER
Payer: COMMERCIAL

## 2020-04-09 PROCEDURE — H2035 A/D TX PROGRAM, PER HOUR: HCPCS | Mod: HQ,GT

## 2020-04-10 NOTE — PROGRESS NOTES
Patient:  Bert Aburto            Adult CD Progress Note and Treatment Plan Review     Attendance  Please refer to OP BE[facility] CD Adult Attendance Record Documentation Flowsheet    Support group attended this week: yes    Reporting sobriety:  yes    Treatment Plan     Treatment Plan Review competed on:    4/10/20    Client preferred learning style: Visual; Hands on; Verbal; Demonstration     Staff Members contributing: SABINA Bobby                    Received Supervision: No    Client: contributed to goals and plan.    Client received copy of plan/revised plan: Yes    Client agrees with plan/revised plan: Yes    Changes to Treatment Plan: No    New Goals added since last review: None    Goals worked on since last review: building sober support, mental health management, physical health management, sober fun, completing continuing education for his professional license, looking for employment, meeting with sponsor/sober mentor, practicing meditation    Strategies effective: yes    Strategies need these changes: Continue working on above goals.     ASAM Risk Rating:    Dimension 1 0 No problems.    Dimension 2 0 No problems.    Dimension 3 1 Pt has a diagnosis of anxiety and depression and has been following recommendations of his physician and remaining medication compliant. Pt denies any suicidal ideation and his mood appears stable. Counselor has worked with Pt to develop a mindfulness meditation practice to increase coping skills and impulse control.    Dimension 4 0 Pt continues to identify the negative consequences of his chemical use. Pt denies any major cravings this week. Pt appears to be continuing to increase his internal motivation for long-term recovery. 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    Dimension 5 2 Pt continues to build and to utilize coping skills to combat  relapse triggers and cues and avoid relapse. Counselor and Pt worked to increase coping skills by practicing mindfulness meditation. Pt appears to be implementing his relapse prevention plan and making positive changes in his attitudes, thoughts, and behaviors to support recovery.     Dimension 6 2 Pt continues to attend meetings and work with a sponsor/sober mentor. Pt is living at home with his wife. 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     Any changes in Vulnerable Adult Status?  No  If yes, add to treatment plan and individual abuse prevention plan.    Family Involvement:   N/A    Data:   Pt expressed concern over his sister who has cancer and has been hospitalized. Pt expressed some grief that she is not currently able to have visitors due to covid restrictions. Pt stated he is struggle with some isolation due to the shelter in place order. Pt stated he is still sober and has been attending meetings. Pt stated he has been meeting his sponsor regularly. Pt states that he is working on his spirituality. Counselor suggested meditation as a way to connect with spirituality and practiced mindfulness meditation with Pt and group. 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    Intervention:   Staff facilitated group and pt actively participated and offered fellow peers feedback.    Assessment:   Pt appears to be doing well this week and seems to be stable and on the right track in his sobriety. Pt verbalizes some struggle with isolation due to shelter in place but is taking the right actions to stay connected with a sober community and continue his recovery.    Plan:  Focus on recovery environment  Monitor emotional/physical health      SABINA Bobby

## 2020-04-10 NOTE — PROGRESS NOTES
"Bert Aburto is a 59 year old male who is being evaluated via a billable video visit.      The patient has been notified of following:     \"This video visit will be conducted via a call between you and your physician/provider. We have found that certain health care needs can be provided without the need for an in-person physical exam.  This service lets us provide the care you need with a video conversation.  If a prescription is necessary we can send it directly to your pharmacy.  If lab work is needed we can place an order for that and you can then stop by our lab to have the test done at a later time.    Video visits are billed at different rates depending on your insurance coverage.  Please reach out to your insurance provider with any questions.    If during the course of the call the physician/provider feels a video visit is not appropriate, you will not be charged for this service.\"    Patient has given verbal consent for Video visit? Yes    How would you like to obtain your AVS? Yvettehardelia    Patient would like the video invitation sent by: Send to e-mail at: [email or web address]      Video Start Time: 4/9/20 5:30pm    Bert Aburto complains of  No chief complaint on file.      I have reviewed and updated the patient's Past Medical History, Social History, Family History and Medication List.    ALLERGIES  Patient has no known allergies.    Additional provider notes: See progress note for appointment notes      Video-Visit Details    Type of service:  Video Visit    Video End Time (time video stopped): 4/9/20 7:02pm    Originating Location (pt. Location): Home    Distant Location (provider location):  [facility]     Mode of Communication:  Video Conference via SABINA Zuluaga      "

## 2020-04-16 ENCOUNTER — HOSPITAL ENCOUNTER (OUTPATIENT)
Dept: BEHAVIORAL HEALTH | Facility: CLINIC | Age: 60
End: 2020-04-16
Attending: SOCIAL WORKER
Payer: COMMERCIAL

## 2020-04-17 PROCEDURE — H2035 A/D TX PROGRAM, PER HOUR: HCPCS | Mod: HQ,GT

## 2020-04-17 NOTE — PROGRESS NOTES
"Bert Aburto is a 59 year old male who is being evaluated via a billable video visit.      The patient has been notified of following:     \"This video visit will be conducted via a call between you and your physician/provider. We have found that certain health care needs can be provided without the need for an in-person physical exam.  This service lets us provide the care you need with a video conversation.  If a prescription is necessary we can send it directly to your pharmacy.  If lab work is needed we can place an order for that and you can then stop by our lab to have the test done at a later time.    Video visits are billed at different rates depending on your insurance coverage.  Please reach out to your insurance provider with any questions.    If during the course of the call the physician/provider feels a video visit is not appropriate, you will not be charged for this service.\"    Patient has given verbal consent for Video visit? Yes    How would you like to obtain your AVS? Curtis    Patient would like the video invitation sent by: Send to e-mail at: [email or web address]      Video Start Time: 5:30pm 4/16/20    Additional provider notes: See progress note / treatment plan review      Video-Visit Details    Type of service:  Video Visit    Video End Time (time video stopped): 7:04pm 4/16/20    Originating Location (pt. Location): Home    Distant Location (provider location):  [facility]     Mode of Communication:  Video Conference via SABINA Zuluaga      "

## 2020-04-17 NOTE — PROGRESS NOTES
"Patient:  Bert Aburto            Adult CD Progress Note and Treatment Plan Review     Attendance  Please refer to OP [facility] CD Adult Attendance Record Documentation Flowsheet    Support group attended this week: yes    Reporting sobriety:  yes    Treatment Plan     Treatment Plan Review competed on:    4/17/20    Client preferred learning style: Visual; Hands on; Verbal; Demonstration     Staff Members contributing: SABINA Bobby                    Received Supervision: No    Client: contributed to goals and plan.    Client received copy of plan/revised plan: Yes    Client agrees with plan/revised plan: Yes    Changes to Treatment Plan: No    New Goals added since last review: None    Goals worked on since last review: building sober support, mental health management, physical health management, sober fun, looking for employment, meeting with sponsor/sober mentor, practicing meditation, practicing mindfulness skills, attending meetings    Strategies effective: yes    Strategies need these changes: Continue working on above goals.     ASAM Risk Rating:    Dimension 1 0 No problems.    Dimension 2 0 No problems.    Dimension 3 1 Pt has a diagnosis of anxiety and depression and has been following recommendations of his physician and remaining medication compliant. Pt denies any suicidal ideation and his mood appears stable. Pt has responded well to work on meditation and mindfulness practice. Pt described noticing and appreciating \"little things\" he would not normally see. Pt MH appears to be improved.      Dimension 4 0 Pt continues to identify the negative consequences of his chemical use. Pt denies any major cravings this week. Pt appears to be continuing to increase his internal motivation for long-term recovery. Pt has continued to display good internal motivation, stating he attend 4 meetings this week and met with his sponsor/sober mentor. Pt continues to show motivation for lifestyle change by practicing " meditation and mindfulness.     Dimension 5 2 Pt continues to build and to utilize coping skills to combat relapse triggers and cues and avoid relapse. Pt reported utilizing meditation and mindfulness skills that Pt and counselor worked on last week. Pt appears to be implementing his relapse prevention plan and making positive changes in his attitudes, thoughts, and behaviors to support recovery. Pt is building long-term sober maintenance skills.    Dimension 6 2 Pt continues to attend meetings and work with a sponsor/sober mentor. Pt is living at home with his wife. Pt reports increased financial health over this period of sobriety. 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    Any changes in Vulnerable Adult Status?  No  If yes, add to treatment plan and individual abuse prevention plan.    Family Involvement:   N/A    Data:   Pt reports he is still sober and has been attending meetings at his local clubhouse and through Zoom. Pt stated he attended a gratitude meeting and was moved by the experience of newcomers that are currently in treatment. Pt appears to be forming strong connections to the sober community. Pt states his financial health has increased over this period of sobriety. Pt reports still connecting with his sponsor / sober mentor on a weekly basis and that he is currently working on his 4th step. Pt states he continues to be distressed by his sister, who is in the hospital and cannot accept visitors. Overall, Pt appears to be doing well physically, emotionally, mentally, and spiritually.     Intervention:   Staff facilitated group and pt actively participated and offered fellow peers feedback. Pt reported difficulty with his 4th step and counselor helped Pt to troubleshoot difficulty. Pt reported this was helpful.    Assessment:   Pt appears to be doing well this week and seems to be stable and on the right track in his sobriety. Overall, Pt  appears to be doing well physically, emotionally, mentally, and spiritually. Pt continues to verbalize some struggle about his sister being in the hospital with cancer and unable to have visitors due to COVID restrictions.    Plan:  Focus on recovery environment  Monitor emotional/physical health  Continue group therapy    Emmanuel Garcia, SABINA

## 2020-04-23 ENCOUNTER — HOSPITAL ENCOUNTER (OUTPATIENT)
Dept: BEHAVIORAL HEALTH | Facility: CLINIC | Age: 60
End: 2020-04-23
Attending: SOCIAL WORKER
Payer: COMMERCIAL

## 2020-04-23 PROCEDURE — H2035 A/D TX PROGRAM, PER HOUR: HCPCS | Mod: HQ,GT

## 2020-04-24 NOTE — PROGRESS NOTES
"Bert Aburto is a 59 year old male who is being evaluated via a billable video visit.      The patient has been notified of following:     \"This video visit will be conducted via a call between you and your physician/provider. We have found that certain health care needs can be provided without the need for an in-person physical exam.  This service lets us provide the care you need with a video conversation.  If a prescription is necessary we can send it directly to your pharmacy.  If lab work is needed we can place an order for that and you can then stop by our lab to have the test done at a later time.    Video visits are billed at different rates depending on your insurance coverage.  Please reach out to your insurance provider with any questions.    If during the course of the call the physician/provider feels a video visit is not appropriate, you will not be charged for this service.\"    Patient has given verbal consent for Video visit? Yes    How would you like to obtain your AVS? Curtis    Patient would like the video invitation sent by: Send to e-mail at: [email or web address]      Video Start Time: 5:30pm 4/23/20    Additional provider notes: See progress note / treatment plan review      Video-Visit Details    Type of service:  Video Visit    Video End Time (time video stopped): 7:08pm 4/23/20    Originating Location (pt. Location): Home    Distant Location (provider location):  [facility]     Mode of Communication:  Video Conference via SABINA Zuluaga      "

## 2020-04-24 NOTE — PROGRESS NOTES
Patient:  Bert Aburto            Adult CD Progress Note and Treatment Plan Review     Attendance  Please refer to OP BEH CD Adult Attendance Record Documentation Flowsheet    Support group attended this week: yes    Reporting sobriety:  yes    Treatment Plan     Treatment Plan Review competed on:    4/23/20    Client preferred learning style: Visual; Hands on; Verbal; Demonstration     Staff Members contributing: SABINA Bobby                    Received Supervision: No    Client: contributed to goals and plan.    Client received copy of plan/revised plan: Yes    Client agrees with plan/revised plan: Yes    Changes to Treatment Plan: No    New Goals added since last review: None    Goals worked on since last review: attending meetings, practicing mindfulness skills, practicing meditation, reaching out to sober support, connecting with family members, sober coping skills, relapse prevention    Strategies effective: yes    Strategies need these changes: Continue working on above goals.     ASAM Risk Rating:    Dimension 1 0 No problems.    Dimension 2 0 No problems.    Dimension 3 1 Pt has a diagnosis of anxiety and depression and has been following recommendations of his physician and remaining medication compliant. Pt denies any suicidal ideation and his mood appears stable. Pt continues to utilize meditation and mindfulness coping skills. Pt appears to be growing in attention paid toward others. Pt appears happy in his sobriety.    Dimension 4 0 Pt continues to identify the negative consequences of his chemical use. Pt denies any major cravings this week. Pt appears to be continuing to increase his internal motivation for long-term recovery. Pt has continued to display good internal motivation, stating he attended his regular meetings and met with his sponsor. Pt set a goal for next week to call his best friend and tell him about going to treatment.    Dimension 5 2 Pt continues to build and to utilize coping  skills to combat relapse triggers and cues and avoid relapse. Pt appears to be implementing his relapse prevention plan. Pt continues to engage work with a sponsor, attend meetings, utilize meditation, and recognize warning signs and risky situations.     Dimension 6 2 Pt continues to attend meetings and work with a sponsor/sober mentor. Pt is living at home with his wife. Pt set a goal to reach out to his best friend and tell him about going to treatment. Pt continuing to connect with sober support including attending meetings and working with a sponsor.    Any changes in Vulnerable Adult Status?  No  If yes, add to treatment plan and individual abuse prevention plan.    Family Involvement:   N/A    Data:   Pt reports he is still sober and has been attending meetings and working with a sponsor. Pt lamented the death of a man that he attended treatment with and stated this was a low point of the week. Pt reports he utilized group support and reached out to others to cope with this loss. Pt described his relationship with his brother growing closer in his sobriety. Pt reports being happy with his recovery. Pt verbalized uncertainty over how to help another brother who is struggling with chemical dependency.    Intervention:   Staff facilitated group and pt actively participated and offered fellow peers feedback. Counselor worked with Pt regarding brother and spoke of [employer] principles, boundaries, and the power of setting a good example.    Assessment:   Pt appears to be doing well and engaging positive actions that support his recovery. Pt appears to be utilizing healthy coping skills following the loss he experienced this week. Pt appears to be utilizing his relapse prevention plan and engaging sober coping skills and long-term sober maintenance skills. Pt continues to show good internal motivation.    Plan:  Focus on recovery environment  Monitor emotional/physical health  Continue group therapy    Emmanuel Garcia  LADC

## 2020-04-30 ENCOUNTER — HOSPITAL ENCOUNTER (OUTPATIENT)
Dept: BEHAVIORAL HEALTH | Facility: CLINIC | Age: 60
End: 2020-04-30
Attending: SOCIAL WORKER
Payer: COMMERCIAL

## 2020-04-30 PROCEDURE — H2035 A/D TX PROGRAM, PER HOUR: HCPCS | Mod: HQ,GT

## 2020-04-30 NOTE — PROGRESS NOTES
Video Visit: Yes, the patient's condition can be safely assessed and treated via synchronous audio and visual telemedicine encounter.      Reason for Video Visit: Services only offered telehealth    Location of Originating Site: Patient's home    Distant Site: Provider Remote Setting

## 2020-04-30 NOTE — PROGRESS NOTES
Patient:  Bert Aburto            Adult CD Progress Note and Treatment Plan Review     Attendance  Please refer to OP BE[facility] CD Adult Attendance Record Documentation Flowsheet    Support group attended this week: yes    Reporting sobriety:  yes    Treatment Plan     Treatment Plan Review competed on:    4/30/20    Client preferred learning style: Visual; Hands on; Verbal; Demonstration     Staff Members contributing: SABINA Bobby                    Received Supervision: No    Client: contributed to goals and plan.    Client received copy of plan/revised plan: Yes    Client agrees with plan/revised plan: Yes    Changes to Treatment Plan: No    New Goals added since last review: None    Goals worked on since last review: mindfulness meditation, practicing mindfulness skills, relapse prevention, sober coping skills, long-term sober maintenance skills, attending meetings, working with sponsor    Strategies effective: yes    Strategies need these changes: Continue working on above goals.     ASAM Risk Rating:    Dimension 1 0 No problems.    Dimension 2 0 No problems.    Dimension 3 1 Pt has a diagnosis of anxiety and depression and has been following recommendations of his physician and remaining medication compliant. Pt denies any suicidal ideation and his mood appears stable. Pt states he has been attending a meditation support meeting and has been utilizing mindfulness and meditation skills. Pt appears to be engaging healthy coping skills. Pt appears happy in his sobriety.    Dimension 4 0 Pt continues to identify the negative consequences of his chemical use. Pt denies any major cravings this week. Pt appears to be continuing to increase his internal motivation for long-term recovery. Pt states that he maintained sobriety this week and continues to show good internal motivation by attending support meetings, working with a sponsor, and learning new sober skills.    Dimension 5 2 Pt continues to build and to utilize  coping skills to combat relapse triggers and cues and avoid relapse. Pt appears to be implementing his relapse prevention plan. Pt continues to work with a sponsor and attend support meetings. Pt also continues to utilize mindfulness and meditation coping skills and long-term sober maintenance skills. Pt appears to be living a program of relapse prevention.    Dimension 6 2 Pt continues to attend meetings and work with a sponsor/sober mentor. Pt is living at home with his wife. Pt reports he did not reach his goal of reaching out to his best friend and telling him that he went to treatment but states that he wants to keep it as his weekly goal and accomplish it this week. Pt appears to be prioritizing connecting with sober peers.    Any changes in Vulnerable Adult Status?  No  If yes, add to treatment plan and individual abuse prevention plan.    Family Involvement:   N/A    Data:   Pt reports he is still sober, met with his sponsor this week, and continued to attend support meetings. Pt reports he has been enjoying learning to meditate and also is trying mindful movement exercises. 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     Intervention:   Staff facilitated group and pt actively participated and offered fellow peers feedback. Counselor worked with Pt regarding mindfulness and meditation skills. Counselor recommended literature / guidance that would be helpful to support a mindfulness and meditation practice.    Assessment:   Pt appears to be doing well and appears happy in his sobriety. Pt continues to take healthy actions and build expertise with sober coping skills and long-term sober maintenance skills. Pt appears to be living a lifestyle conducive to recovery. Pt needs to continue practicing this way of living and solidifying it into habit while seeking support from his group and sober peers.    Plan:  Focus on recovery environment  Monitor emotional/physical health  Continue group therapy    Emmanuel  [facility]

## 2020-04-30 NOTE — PROGRESS NOTES
"Bert Aburto is a 59 year old male who is being evaluated via a billable video visit.      The patient has been notified of following:     \"This video visit will be conducted via a call between you and your physician/provider. We have found that certain health care needs can be provided without the need for an in-person physical exam.  This service lets us provide the care you need with a video conversation.  If a prescription is necessary we can send it directly to your pharmacy.  If lab work is needed we can place an order for that and you can then stop by our lab to have the test done at a later time.    Video visits are billed at different rates depending on your insurance coverage.  Please reach out to your insurance provider with any questions.    If during the course of the call the physician/provider feels a video visit is not appropriate, you will not be charged for this service.\"    Patient has given verbal consent for Video visit? Yes    How would you like to obtain your AVS? Curtis    Patient would like the video invitation sent by: Send to e-mail at: [email or web address]      Video Start Time: 5:30pm 4/30/20    Additional provider notes: See progress note / treatment plan review      Video-Visit Details    Type of service:  Video Visit    Video End Time (time video stopped): 6:38pm 4/30/20    Originating Location (pt. Location): Home    Distant Location (provider location):  [facility]     Mode of Communication:  Video Conference via SABINA Zuluaga      "

## 2020-05-07 ENCOUNTER — HOSPITAL ENCOUNTER (OUTPATIENT)
Dept: BEHAVIORAL HEALTH | Facility: CLINIC | Age: 60
End: 2020-05-07
Attending: FAMILY MEDICINE
Payer: COMMERCIAL

## 2020-05-07 PROCEDURE — H2035 A/D TX PROGRAM, PER HOUR: HCPCS | Mod: HQ,GT,95

## 2020-05-08 NOTE — PROGRESS NOTES
"Bert Aburto is a 59 year old male who is being evaluated via a billable video visit.      The patient has been notified of following:     \"This video visit will be conducted via a call between you and your physician/provider. We have found that certain health care needs can be provided without the need for an in-person physical exam.  This service lets us provide the care you need with a video conversation.  If a prescription is necessary we can send it directly to your pharmacy.  If lab work is needed we can place an order for that and you can then stop by our lab to have the test done at a later time.    Video visits are billed at different rates depending on your insurance coverage.  Please reach out to your insurance provider with any questions.    If during the course of the call the physician/provider feels a video visit is not appropriate, you will not be charged for this service.\"    Patient has given verbal consent for Video visit? Yes    How would you like to obtain your AVS? Curtis    Patient would like the video invitation sent by: Send to e-mail at: [email or web address]      Video Start Time: 5:30pm 5/7/20    Additional provider notes: See progress note / treatment plan review      Video-Visit Details    Type of service:  Video Visit    Video End Time (time video stopped): 7:05pm 5/7/20    Originating Location (pt. Location): Home    Distant Location (provider location):  [facility]     Mode of Communication:  Video Conference via SABINA Zuluaga      "

## 2020-05-08 NOTE — PROGRESS NOTES
Patient:  Bert Aburto            Adult CD Progress Note and Treatment Plan Review     Attendance  Please refer to OP BE[facility] CD Adult Attendance Record Documentation Flowsheet    Support group attended this week: yes    Reporting sobriety:  yes    Treatment Plan     Treatment Plan Review competed on:    5/8/20    Client preferred learning style: Visual; Hands on; Verbal; Demonstration     Staff Members contributing: SABINA Bobby                    Received Supervision: No    Client: contributed to goals and plan.    Client received copy of plan/revised plan: Yes    Client agrees with plan/revised plan: Yes    Changes to Treatment Plan: No    New Goals added since last review: None    Goals worked on since last review: mindfulness meditation, practicing mindfulness skills, relapse prevention, sober coping skills, long-term sober maintenance skills, attending meetings, working with sponsor    Strategies effective: yes    Strategies need these changes: Continue working on above goals.     ASAM Risk Rating:    Dimension 1 0 No problems.    Dimension 2 0 No problems.    Dimension 3 1 Pt has a diagnosis of anxiety and depression and has been following recommendations of his physician and remaining medication compliant. Pt denies any suicidal ideation and his mood appears stable. Pt states he has continued to engage mindfulness and meditation practice and is building sober coping skills and impulse control. Pt appears happy in his sobriety and in good mental health.    Dimension 4 0 Pt continues to identify the negative consequences of his chemical use. Pt denies any major cravings this week. Pt continues to display good internal motivation by attending regular 12 step meetings, working with a sponsor, attending a meditation meeting, and utilizing coping skills. Pt continues to display an attitude that his life is much better when he is sober and appears motivated to take continued action to maintain his sobriety.     Dimension 5 2 Pt continues to implement his relapse prevention plan. Pt has been utilizing sober coping skills and long term sober maintenance skills such as meditation and mindfulness, attending 12 step meetings, working with a sponsor, getting physical exercise, and engaging with his family. Pt appears to be building skills, such as mindfulness, that he can utilize in stressful situations that may have been previous triggers to use.     Dimension 6 2 Pt states that he is attending 12 step support meetings every day of the week except Saturday. Pt continues to work with a sponsor. Pt appears to be engaging on a high level to build sober support. Pt is well-engaged in his aftercare plan and is following the recommendations he received at discharge from  tx.    Any changes in Vulnerable Adult Status?  No  If yes, add to treatment plan and individual abuse prevention plan.    Family Involvement:   N/A    Data:   Pt reports sobriety and states that he attended 6 12 step meetings this last week. Pt continues not to work but this may be at least partially due to the current shutdown. Pt reports feeling happy in his recovery. Pt reports utilizing meditation and mindfulness skills and growing in his ability to implement these skills. Pt reports continued work with a sponsor.    Intervention:   Staff facilitated group and pt actively participated and offered fellow peers feedback.     Assessment:   Pt appears to continue to have good internal motivation to sustain his recovery. Pt continues to identify the negative aspects of his life when drinking and to regard his current sobriety positively. Pt mental health appears to be good and he reports utilizing effective sober coping skills and appears to be building impulse control. Pt appears to be growing in skills to prevent relapse and is beginning to harden these skills into habit. Pt appears well-poised to achieve longer-term sobriety.    Plan:  Focus on recovery  environment  Monitor emotional/physical health  Continue group therapy  Support Pt to continue engaging meetings, working with sponsor, and practicing mindfulness/meditation skills  Encourage contact with loved ones and sober peers to combat isolation due to coronavirus shutdown    SABINA Bobby

## 2020-05-14 ENCOUNTER — HOSPITAL ENCOUNTER (OUTPATIENT)
Dept: BEHAVIORAL HEALTH | Facility: CLINIC | Age: 60
End: 2020-05-14
Attending: FAMILY MEDICINE
Payer: COMMERCIAL

## 2020-05-14 PROCEDURE — H2035 A/D TX PROGRAM, PER HOUR: HCPCS | Mod: HQ,GT

## 2020-05-15 NOTE — PROGRESS NOTES
"Bert Aburto is a 59 year old male who is being evaluated via a billable video visit.      The patient has been notified of following:     \"This video visit will be conducted via a call between you and your physician/provider. We have found that certain health care needs can be provided without the need for an in-person physical exam.  This service lets us provide the care you need with a video conversation.  If a prescription is necessary we can send it directly to your pharmacy.  If lab work is needed we can place an order for that and you can then stop by our lab to have the test done at a later time.    Video visits are billed at different rates depending on your insurance coverage.  Please reach out to your insurance provider with any questions.    If during the course of the call the physician/provider feels a video visit is not appropriate, you will not be charged for this service.\"    Patient has given verbal consent for Video visit? Yes    How would you like to obtain your AVS? Curtis    Patient would like the video invitation sent by: Send to e-mail at: [email or web address]      Video Start Time: 5:30pm 5/14/20    Additional provider notes: See progress note / treatment plan review      Video-Visit Details    Type of service:  Video Visit    Video End Time (time video stopped): 7:06pm 5/14/20    Originating Location (pt. Location): Home    Distant Location (provider location):  [facility]     Mode of Communication:  Video Conference via SABINA Zuluaga      "

## 2020-05-21 ENCOUNTER — HOSPITAL ENCOUNTER (OUTPATIENT)
Dept: BEHAVIORAL HEALTH | Facility: CLINIC | Age: 60
End: 2020-05-21
Attending: FAMILY MEDICINE
Payer: COMMERCIAL

## 2020-05-21 PROCEDURE — H2035 A/D TX PROGRAM, PER HOUR: HCPCS | Mod: HQ,GT

## 2020-05-21 NOTE — PROGRESS NOTES
"Bert Aburto is a 59 year old male who is being evaluated via a billable video visit.      The patient has been notified of following:     \"This video visit will be conducted via a call between you and your physician/provider. We have found that certain health care needs can be provided without the need for an in-person physical exam.  This service lets us provide the care you need with a video conversation.  If a prescription is necessary we can send it directly to your pharmacy.  If lab work is needed we can place an order for that and you can then stop by our lab to have the test done at a later time.    Video visits are billed at different rates depending on your insurance coverage.  Please reach out to your insurance provider with any questions.    If during the course of the call the physician/provider feels a video visit is not appropriate, you will not be charged for this service.\"    Patient has given verbal consent for Video visit? Yes    How would you like to obtain your AVS? Curtis    Patient would like the video invitation sent by: Send to e-mail at: [email or web address]      Video Start Time: 5:30pm 5/21/20    Additional provider notes: See progress note / treatment plan review      Video-Visit Details    Type of service:  Video Visit    Video End Time (time video stopped): 7:06pm 5/21/20    Originating Location (pt. Location): Home    Distant Location (provider location):  [facility]     Mode of Communication:  Video Conference via SABINA Zuluaga      "

## 2020-05-22 NOTE — PROGRESS NOTES
Patient:  Bert Aburto            Adult CD Progress Note and Treatment Plan Review     Attendance  Please refer to OP BEH CD Adult Attendance Record Documentation Flowsheet    Support group attended this week: yes    Reporting sobriety:  yes    Treatment Plan     Treatment Plan Review competed on:    5/22/20    Client preferred learning style: Visual; Hands on; Verbal; Demonstration     Staff Members contributing: SABINA Bobby                    Received Supervision: No    Client: contributed to goals and plan.    Client received copy of plan/revised plan: Yes    Client agrees with plan/revised plan: Yes    Changes to Treatment Plan: No    New Goals added since last review: None    Goals worked on since last review: long-term sober maintenance skills, sober coping skills, mindfulness and meditation, attending meetings, working with a sponsor, navigating relationships, relapse prevention    Strategies effective: yes    Strategies need these changes: Continue working on above goals.     ASAM Risk Rating:    Dimension 1 0 No problems.    Dimension 2 0 No problems.    Dimension 3 1 Pt has a diagnosis of anxiety and depression and has been following recommendations of his physician and remaining medication compliant. Pt denies any suicidal ideation and his mood appears stable. Pt reports continued engagement in mindfulness and meditation coping skills. Pt reports attending a support group for meditation. Pt MH appears stable.    Dimension 4 0 Pt continues to identify the negative consequences of his chemical use. Pt denies any major cravings this week. Pt continues to display good internal motivation to maintain his sobriety and to take action that supports long-term sobriety. Pt displays strong motivation through actions such as attending regular meetings, working with a sponsor, attending Phase II, practicing mindfulness and meditation.    Dimension 5 2 Pt continues to implement his relapse prevention plan. Pt has  been utilizing sober coping skills and building long-term sober maintenance skills such as mindfulness and meditation. Pt has been following his relapse prevention plan by taking actions such as attending support meetings and working the 12 steps with a sponsor. Pt needs to continue engaging sober coping skills and long-term sober maintenance skills to solidify these as habit.    Dimension 6 2 Pt states that he has continued to attend 12 step groups and work with his sponsor. Pt has been working to build connections with family members and members of the sober community. Pt is supportive toward others in group. Pt needs to continue to be strongly involved in the sober community to solidify sober support.    Any changes in Vulnerable Adult Status?  No  If yes, add to treatment plan and individual abuse prevention plan.    Family Involvement:   N/A    Data:   Pt reports sobriety and that he attended four 12 step meetings this week and has been in regular contact with his sponsor. Pt reports involvement with his family and supporting his sister. Pt reports continuing to use mindfulness and meditation skills and states that he is working on his 4th step with his sponsor.    Intervention:   Staff facilitated group and pt actively participated and offered fellow peers feedback.     Assessment:   Pt appears to continue to be positively focused and to have good internal motivation. Pt appears to be implementing his relapse prevention plan and is doing a good job of building sober coping skills and repeating daily actions that maintain long-term sobriety. Pt's mood appears stable and positive.     Plan:  Focus on recovery environment  Monitor emotional/physical health  Continue group therapy  Support Pt to continue engaging meetings, working with sponsor, and practicing mindfulness/meditation skills  Encourage contact with loved ones and sober peers to combat isolation due to coronavirus shutdown    SABINA Bobby

## 2020-05-28 ENCOUNTER — HOSPITAL ENCOUNTER (OUTPATIENT)
Dept: BEHAVIORAL HEALTH | Facility: CLINIC | Age: 60
End: 2020-05-28
Attending: FAMILY MEDICINE
Payer: COMMERCIAL

## 2020-05-28 PROCEDURE — H2035 A/D TX PROGRAM, PER HOUR: HCPCS | Mod: HQ,GT

## 2020-05-28 NOTE — PROGRESS NOTES
"Bert Aburto is a 59 year old male who is being evaluated via a billable video visit.      The patient has been notified of following:     \"This video visit will be conducted via a call between you and your physician/provider. We have found that certain health care needs can be provided without the need for an in-person physical exam.  This service lets us provide the care you need with a video conversation.  If a prescription is necessary we can send it directly to your pharmacy.  If lab work is needed we can place an order for that and you can then stop by our lab to have the test done at a later time.    Video visits are billed at different rates depending on your insurance coverage.  Please reach out to your insurance provider with any questions.    If during the course of the call the physician/provider feels a video visit is not appropriate, you will not be charged for this service.\"    Patient has given verbal consent for Video visit? Yes    How would you like to obtain your AVS? Curtis    Patient would like the video invitation sent by: Send to e-mail at: [email or web address]      Video Start Time: 5:30pm 5/28/20    Additional provider notes: See progress note / treatment plan review      Video-Visit Details    Type of service:  Video Visit    Video End Time (time video stopped): 7:01pm 5/28/20    Originating Location (pt. Location): Home    Distant Location (provider location):  [facility]     Mode of Communication:  Video Conference via SABINA Zuluaga      "

## 2020-05-29 NOTE — PROGRESS NOTES
Patient:  Bert Aburto            Adult CD Progress Note and Treatment Plan Review     Attendance  Please refer to OP BEH CD Adult Attendance Record Documentation Flowsheet    Support group attended this week: yes    Reporting sobriety:  yes    Treatment Plan     Treatment Plan Review competed on:    5/29/20    Client preferred learning style: Visual; Hands on; Verbal; Demonstration     Staff Members contributing: SABINA Bobby                    Received Supervision: No    Client: contributed to goals and plan.    Client received copy of plan/revised plan: Yes    Client agrees with plan/revised plan: Yes    Changes to Treatment Plan: No    New Goals added since last review: None    Goals worked on since last review: long-term sober maintenance skills, sober coping skills, mindfulness and meditation, attending meetings, working with a sponsor, navigating relationships, relapse prevention    Strategies effective: yes    Strategies need these changes: Continue working on above goals.     ASAM Risk Rating:    Dimension 1 0 No problems.    Dimension 2 0 No problems.    Dimension 3 1 Pt has a diagnosis of anxiety and depression and has been following recommendations of his physician and remaining medication compliant. Pt denies any suicidal ideation and his mood appears stable. Pt continues to practice mindfulness and meditation and states he believes these activities will be critical to his sobriety and good mental health. Pt's MH appears stable.    Dimension 4 0 Pt continues to identify the negative consequences of his chemical use. Pt denies any major cravings this week. Pt continues to display good internal motivation to maintain his sobriety and to take action that supports long-term sobriety, evidenced by actions such as attending regular meetings and working with a sponsor.     Dimension 5 2 Pt continues to implement his relapse prevention plan. Pt has been utilizing sober coping skills and building long-term  sober maintenance skills such as mindfulness and meditation. Pt has been attending an inventory workshop with his sponsor and attending support groups. Pt is utilizing sober coping skills and following his RPP. Pt appears to have gained considerable insight toward his relapse process and is becoming habituated toward engaging regular action that prevents relapse.    Dimension 6 2 Pt states that he has continued to attend 12 step groups and work with his sponsor. Pt continues to engage the sober community on a high level and is doing a good job of building support. 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 Pt has been following his aftercare plan. Pt is doing a good job of tending to his family relationships.    Any changes in Vulnerable Adult Status?  No  If yes, add to treatment plan and individual abuse prevention plan.    Family Involvement:   N/A    Data:   Pt states he had 6 months of sobriety on Tuesday. 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 Pt states he met with his sponsor and attended several support meetings this week. Pt states he is getting a lot out of working the steps and getting a lot from practicing meditation and utilizing mindfulness.    Intervention:   Staff facilitated group and pt actively participated and offered fellow peers feedback.     Assessment:   Pt appears to have stabilized in his recovery and is engaged in daily activities that support recovery. Pt appears to have built healthy habits and routines. Pt's return to work at this time seems appropriate and indicated. Pt's MH appears stable. Pt appears ready to transition from Ph II group to fully independent living without a therapeutic support group.    Plan:  Discharge from Ph II group    SABINA Bobby

## 2020-06-06 NOTE — DISCHARGE SUMMARY
VISIT DATE: 5/28/20     EVALUATION COUNSELOR: Discharge summary from residential treatment completed on 1/2/20 by SABINA Wakefield.     TREATMENT COUNSELOR: SABINA Bobby, [phone]     REFERRAL SOURCE: Self     PROGRAM: [facility] Phase II Outpatient Group     ADMISSION DATE:  1/9/20     DISCHARGE DATE:  5/28/20     LAST SEEN DATE:  5/28/20     ADMISSION DIAGNOSIS:   1. 303.90 Alcohol Dependence     DISCHARGE DIAGNOSIS:   1.  303.90 Alcohol Dependence in Remission     DISCHARGE STATUS: Satisfactory - completed outpatient treatment     LAST USE DATE:  Alcohol 11/21/2019     DAYS/HOURS OF TREATMENT COMPLETED: 18 units     PRESENTING INFORMATION:  Pt transitioned to  II OP group following  residential treatment     SERVICES PROVIDED:  Assessment, treatment planning, education concerning chemical dependency, mental health, and relapse prevention. Pt also participated in group therapy, recovery skills training, and aftercare planning.     ISSUES ADDRESSED IN TREATMENT:   DIMENSION 1:  Acute Intoxication Withdrawal Potential:    Risk Rating at admission 0  Risk Rating at discharge 0  No issue - Pt REGINA date of alcohol 11/21/2019     DIMENSION 2:  Biomedical Conditions and Complaints:    Risk Rating at admission 1  Risk Rating at discharge 0   Pt is not experiencing medical issues or concerns.     DIMENSION 3:  Emotional/Behavioral/Cognitive Conditions and Complications:    Risk Rating at admission 1  Risk Rating at discharge 0     Pt currently does not meet criteria for a MH diagnosis. Pt learned coping skills to arrest feelings of anxiety, such as mindfulness and meditation. Pt reports regularly using mindfulness and meditation and reports good mental health.     DIMENSION 4:  Readiness to Change:    Risk Rating at admission 1  Risk Rating at discharge 0    Pt was a regular participant in group and took continued action to support his recovery throughout his time in [facility]. Pt shows great  internal motivation to sustain recovery.     DIMENSION 5:  Relapse, Continued Use, Continued Problem Potential:    Risk rating at admission 3  Risk rating at discharge 2     Pt gained a good understanding of his personal relapse process including warning signs and triggers. Pt actively works with a sponsor to monitor for these. Pt has learned good sober coping skills and long-term sober maintenance skills and has been applying them on a regular basis.      DIMENSION 6:  Recovery Environment:    Risk Rating at admission 2  Risk Rating at discharge 1       Pt's current environment is healthy and supportive of recovery. Pt attends support meetings regularly and meets with his sponsor weekly. Pt is in the process of finding a job and is currently unemployed.     STRENGTHS: Pt maintained a positive attitude while in treatment. Pt was an active participant. Pt was open for feedback from peers. Pt appears highly motivated for recovery. Pt displayed willingness to complete assignments and activities recommended by his care staff.     PROGNOSIS: The prognosis is favorable     LIVING ARRANGEMENTS AT DISCHARGE:  The Pt will be living at home with his wife.     CONTINUING CARE RECOMMENDATIONS AND REFERRALS:   1.  Abstain from all mood-altering chemicals except those prescribed and non-addictive.   2.  Attendance at a minimum of three 12-step meetings per week.   3.  Continue to work with your sponsor and complete the 12 steps.    4.  Monitor and comply with the advice of doctor regarding mental and physical health issues. Take all medications as prescribed.   5.  Continue to invest in building a sober support network.   6.  Continue to monitor and gain understanding of relapse triggers and stressors through the use and development of healthy coping skills.         This information has been disclosed to you from records protected by Federal confidentiality rules (42 CFR part 2). The Federal rules prohibit you from making any  further disclosure of this information unless further disclosure is expressly permitted by the written consent of the person to whom it pertains or as otherwise permitted by 42 CFR part 2. A general authorization for the release of medical or other information is NOT sufficient for this purpose. The Federal rules restrict any use of the information to criminally investigate or prosecute any alcohol or drug abuse patient.      Emmanuel Garcia [facility]

## 2020-06-06 NOTE — ADDENDUM NOTE
Encounter addended by: Emmanuel Garcia LADC on: 6/6/2020 12:06 PM   Actions taken: Clinical Note Signed

## 2020-07-10 NOTE — PROGRESS NOTES
Patient:  Bert Aburto            Adult CD Progress Note and Treatment Plan Review     Attendance  Please refer to OP BEH CD Adult Attendance Record Documentation Flowsheet    Support group attended this week: yes    Reporting sobriety:  yes    Treatment Plan     Treatment Plan Review competed on:    2/20/20    Client preferred learning style: Visual; Hands on; Verbal; Demonstration     Staff Members contributing: SABINA Bhardwaj                    Received Supervision: No    Client: contributed to goals and plan.    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     ASAM Risk Rating:    Dimension 1 0 No problems.    Dimension 2 0 No problems.    Dimension 3 1 Pt has a diagnosis of anxiety and depression and has been following recommendations of his physician and remaining medication compliant. Pt denies any suicidal ideation and his mood appears stable.     Dimension 4 0 Pt continues to identify the negative consequences of his chemical use. Pt denies any major cravings this week. Pt appears to be continuing to increase his internal motivation for long-term recovery.     Dimension 5 2 Pt continues to build and to utilize coping skills to combat relapse triggers and cues and avoid relapse. Pt appears to be implementing his relapse prevention plan and making positive changes in his attitudes, thoughts, and behaviors to support recovery.     Dimension 6 2 Pt reported that he attended five or six 12-step meetings this week and that  he does have a sponsor whom he is in contact with.  Pt is living at home with his wife. 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 Pt talked about starting to look for employment.     Any changes in Vulnerable Adult Status?  No  If yes, add to treatment plan and individual abuse prevention plan.    Family Involvement:   N/A    Data:   Pt stated that he is feeling confident, happy, relieved, and a little anxious this evening. Pt reported that he went to a meeting with his friends from [place]. Pt also talked about his professional license and what he's been doing to work on this. Pt reported that he did not have any lows this week and things are going well.     Intervention:   Staff facilitated group and pt actively participated and offered fellow peers feedback.    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     Plan:  Focus on recovery environment  Monitor emotional/physical health      SABINA Mosley

## 2020-07-10 NOTE — PROGRESS NOTES
Patient:  Bert Aburto            Adult CD Progress Note and Treatment Plan Review     Attendance  Please refer to OP BEH CD Adult Attendance Record Documentation Flowsheet    Support group attended this week: yes    Reporting sobriety:  yes    Treatment Plan     Treatment Plan Review competed on:    2/27/20    Client preferred learning style: Visual; Hands on; Verbal; Demonstration     Staff Members contributing: SABINA Bhardwaj                    Received Supervision: No    Client: contributed to goals and plan.    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     ASAM Risk Rating:    Dimension 1 0 No problems.    Dimension 2 0 No problems.    Dimension 3 1 Pt has a diagnosis of anxiety and depression and has been following recommendations of his physician and remaining medication compliant. Pt denies any suicidal ideation and his mood appears stable.     Dimension 4 0 Pt continues to identify the negative consequences of his chemical use. Pt denies any major cravings this week. Pt appears to be continuing to increase his internal motivation for long-term recovery.     Dimension 5 2 Pt continues to build and to utilize coping skills to combat relapse triggers and cues and avoid relapse. Pt appears to be implementing his relapse prevention plan and making positive changes in his attitudes, thoughts, and behaviors to support recovery.     Dimension 6 2 Pt reported that he attended five 12-step meetings this week and that he does  have a sponsor whom he is in contact with.  Pt is living at home with his wife. 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 Pt talked about starting to look for employment.     Any changes in Vulnerable Adult Status?  No  If yes, add to treatment plan and individual abuse prevention plan.    Family Involvement:   N/A    Data:   Pt stated that he is feeling confident, courageous, happy, and safe this evening. Pt reported that he got his 3 month medallion at a meeting this week. Pt shared that he got his taxes done this week and that it feels good to have gotten them done.     Intervention:   Staff facilitated group and pt actively participated and offered fellow peers feedback.    Assessment:   Pt appears to be taking responsibility for his life and things that need to get done..     Plan:  Focus on recovery environment  Monitor emotional/physical health      SABINA Mosley

## 2020-07-14 NOTE — PROGRESS NOTES
Patient:  Bert Aburto            Adult CD Progress Note and Treatment Plan Review     Attendance  Please refer to OP BEH CD Adult Attendance Record Documentation Flowsheet    Support group attended this week: yes    Reporting sobriety:  yes    Treatment Plan     Treatment Plan Review competed on:    3/5/20    Client preferred learning style: Visual; Hands on; Verbal; Demonstration     Staff Members contributing: SABINA Bhardwaj                    Received Supervision: No    Client: contributed to goals and plan.    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     ASAM Risk Rating:    Dimension 1 0 No problems.    Dimension 2 0 No problems.    Dimension 3 1 Pt has a diagnosis of anxiety and depression and has been following recommendations of his physician and remaining medication compliant. Pt denies any suicidal ideation and his mood appears stable.     Dimension 4 0 Pt continues to identify the negative consequences of his chemical use. Pt denies any major cravings this week. Pt appears to be continuing to increase his internal motivation for long-term recovery.     Dimension 5 2 Pt continues to build and to utilize coping skills to combat relapse triggers and cues and avoid relapse. Pt appears to be implementing his relapse prevention plan and making positive changes in his attitudes, thoughts, and behaviors to support recovery.     Dimension 6 2 Pt reported that he attended six 12-step meetings this week and that he has a sponsor whom he is in contact with.   Pt is living at home with his wife. 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     Any changes in Vulnerable Adult Status?  No  If yes, add to treatment plan and individual abuse prevention plan.    Family Involvement:   N/A    Data:   Pt stated that he is feeling confident, courageous, and a little pissed this evening. 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 Pt reported that other than that, he had a good week.    Intervention:   Staff facilitated group and pt actively participated and offered fellow peers feedback.    Assessment:   Pt appears to be doing well and seems stable.     Plan:  Focus on recovery environment  Monitor emotional/physical health      SABINA Mosley

## 2020-07-14 NOTE — ADDENDUM NOTE
Encounter addended by: Adán Tam [facility] on: 7/14/2020 2:44 PM   Actions taken: Episode edited, Clinical Note Signed

## 2020-07-14 NOTE — PROGRESS NOTES
[address] [address]          TO WHOM IT MAY CONCERN:    RE: Aftercare        Bert NEPTALI Deady completed primary treatment for chemical dependency at [facility].  Following this, the patient was referred to the Phase II Aftercare program.  It consists of 8 - 12 weekly support group sessions that are counselor led.    __X___ Patient completed Phase II on 5/28/20, and attended 18 sessions.         If you have any further questions or concerns, please do not hesitate to contact me at [phone].      Sincerely,      SABINA Pool.

## 2020-07-14 NOTE — PROGRESS NOTES
Patient:  Bert Aburto            Adult CD Progress Note and Treatment Plan Review     Attendance  Please refer to OP BEH CD Adult Attendance Record Documentation Flowsheet    Support group attended this week: yes    Reporting sobriety:  yes    Treatment Plan     Treatment Plan Review competed on:    3/12/20    Client preferred learning style: Visual; Hands on; Verbal; Demonstration     Staff Members contributing: SABINA Bhardwaj                    Received Supervision: No    Client: contributed to goals and plan.    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     ASAM Risk Rating:    Dimension 1 0 No problems.    Dimension 2 0 No problems.    Dimension 3 1 Pt has a diagnosis of anxiety and depression and has been following recommendations of his physician and remaining medication compliant. Pt denies any suicidal ideation and his mood appears stable.     Dimension 4 0 Pt continues to identify the negative consequences of his chemical use. Pt denies any major cravings this week. Pt appears to be continuing to increase his internal motivation for long-term recovery.     Dimension 5 2 Pt continues to build and to utilize coping skills to combat relapse triggers and cues and avoid relapse. Pt appears to be implementing his relapse prevention plan and making positive changes in his attitudes, thoughts, and behaviors to support recovery.     Dimension 6 2 Pt reported that he attended five 12-step meetings this week and that  he has a sponsor whom he is in contact with.  Pt is living at home with his wife. 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 .     Any changes in Vulnerable Adult Status?  No  If yes, add to treatment plan and individual abuse prevention plan.    Family Involvement:   N/A    Data:   Pt stated that he is feeling tired, confident, peaceful and joyful this week. 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 Pt also talked about a small argument he got into with his wife and how they resolved it and communicated to work it out.     Intervention:   Staff facilitated group and pt actively participated and offered fellow peers feedback.    Assessment:   Pt appears to be relieved that he went in from of his board and seems to be more peaceful.     Plan:  Focus on recovery environment  Monitor emotional/physical health      SABINA Mosley

## 2021-06-17 NOTE — ED AVS SNAPSHOT
[facility]    Facility:   [facility] [[id number]]   Code Status: POLST AVAILABLE      CHIEF COMPLAINT/REASON FOR VISIT:  Chief Complaint   Patient presents with     Review Of Multiple Medical Conditions       HISTORY:      HPI: María Elena is a 52 y.o. female who I am seeing today in follow-up of her multiple medical problems.  She was seen in her room.  She has finished her dinner.  She has been having cough for about 3-4 days now.  Accompanied by scratchy throat.  Slight congestion.  No sputum.  Feeling feverish but when I looked back into the matrix E HR, I do not see any documentation of fevers.  Denies any shortness of breath.  She believes that that started after the facility turned off this furnace she has been feeling cold in her room.    Last seen Dr. Chaudhari 2 months ago.  She had a flare of her rheumatoid arthritis.  She was placed on tapering doses of prednisone.  She remains on 3 different medications for rheumatoid arthritis sulfasalazine hydroxychloroquine and Humira.    Still walking only about [facility] across the hallway.  Weight has not changed by much.  Her eventual plan would be to  Have her other hip replaced and get better with her ambulation and her activities of daily living so she can live out on her own and possibly an assisted living facility.    Her INR is therapeutic.  She remains on Coumadin    Past Medical History:   Diagnosis Date     Aseptic necrosis of femoral head     LEFT     DJD (degenerative joint disease)      DVT, bilateral lower limbs 10/2014     Edema - swelling of the legs after blood clots 5/22/2015     Essential hypertension with goal blood pressure less than 140/90      Failure to thrive      History of blood clots      Hypokalemia 10/15/2014     Lung mass 10/18/2014     Morbid obesity 4/10/2015    Had formal nutrition consult at [facility].  RD reports that she is not willing to commit to the program outlined.  See scanned document.  [facility] [place], Emergency Department  [address]  [facility] [address]  Phone:  [phone]  Fax:  [phone]                                    Bert Aburto   MRN: [id number]    Department:  [facility], Emergency Department   Date of Visit:  11/18/2019           After Visit Summary Signature Page    I have received my discharge instructions, and my questions have been answered. I have discussed any challenges I see with this plan with the nurse or doctor.    ..........................................................................................................................................  Patient/Patient Representative Signature      ..........................................................................................................................................  Patient Representative Print Name and Relationship to Patient    ..................................................               ................................................  Date                                   Time    ..........................................................................................................................................  Reviewed by Signature/Title    ...................................................              ..............................................  Date                                               Time          22EPIC Rev 08/18         12/15/2015 - Marcio Quinonez MD        Obesity - although she was not weighed today, she is clearly obese on inspection. 4/10/2015     LOPEZ (obstructive sleep apnea) - abnormal overnight pulse oximetry 5/22/2015     Osteoarthritis      Peripheral vascular disease      Pleural effusion      Pressure ulcer of foot      Rheumatoid arthritis 12/30/2014    seronegative     Seropositive rheumatoid arthritis 1/19/2016     Vitamin D deficiency 8/26/2015             Family History   Problem Relation Age of Onset     Multiple myeloma Father      Deep vein thrombosis Father      Cataracts Father      Snoring Father      Other Brother      Blood withdrawn from time to time.  Sounds like hemochromatosis.     Sleep apnea Brother      Cancer Mother      Uterine CA     Arthritis Mother      Cataracts Mother      Breast cancer Mother 54     No Medical Problems Sister      Rheum arthritis Maternal Grandmother      Breast cancer Maternal Grandmother 50     Heart disease Maternal Grandmother      Rheum arthritis Paternal Aunt      Breast cancer Maternal Aunt 54     Breast cancer Cousin      Clotting disorder Neg Hx      Social History     Social History     Marital status: Single     Spouse name: N/A     Number of children: N/A     Years of education: N/A     Social History Main Topics     Smoking status: Former Smoker     Packs/day: 1.00     Years: 30.00     Types: Cigarettes     Smokeless tobacco: Never Used     Alcohol use Yes      Comment: 3-4 times a year she will have 1 or 2.     Drug use: No     Sexual activity: No     Other Topics Concern     Not on file     Social History Narrative    Long term  At [facility] to rehab to [facility] allowing surgery on deteriorated joints  LTC12/2015         Review of Systems  Unremarkable vital signs noted.  Stable.    .There were no vitals filed for this visit.    Physical Exam    Patient is alert, awake, oriented to time, place and person, not in acute cardiorespiratory distress  Skin:  Warm, and moist,  no lesions,   Head: atraumatic, normocephalic,   No tonsillar exudates no pharyngeal erythema I do not feel any lymph nodes.    No definite sinus tenderness  Eyes: EOM's intact and conjugate, pink palpebral conjunctivae, anicteric sclerae, pupils reactive  Lungs : Clear to auscultation , no crackles, wheezes or rhonchi  Heart : Nornal first and second heart sounds, No murmurs, heaves, or thrills  Abdomen: Soft, non tender, non distended, no hepatosplenomegaly, no ascites  Extremities: No change in bipedal edema, pulses are full and equal      LABS:   No results found for this or any previous visit (from the past 72 hour(s)).      ASSESSMENT:      ICD-10-CM    1. Rheumatoid arthritis M06.9    2. Essential hypertension with goal blood pressure less than 140/90 I10    3. DVT of lower extremity, bilateral - residual clot disease on repeat US in NEW location - after six months of warfarin. I82.403    4. Obesity E66.9    5. Cough R05        PLAN:    Continue management per Dr. Wray.  Blood pressure under good control with current regimen.  Mucinex  twice daily for the next 7 days.  She could not tolerate any nasal sports continue Coumadin for DVT.    Continue to encourage walking    Total 25 minutes of which 55% was spent counseling and coordination of care of the above plan.    Electronically signed by: Uvaldo Brunner MD

## 2021-11-12 ENCOUNTER — VIRTUAL VISIT (OUTPATIENT)
Dept: PSYCHIATRY | Facility: CLINIC | Age: 61
End: 2021-11-12
Attending: PSYCHOLOGIST
Payer: COMMERCIAL

## 2021-11-12 DIAGNOSIS — F10.939 ALCOHOL WITHDRAWAL WITH COMPLICATION WITH INPATIENT TREATMENT, WITH UNSPECIFIED COMPLICATION (H): Primary | ICD-10-CM

## 2021-11-12 PROCEDURE — 90837 PSYTX W PT 60 MINUTES: CPT | Mod: GT | Performed by: PSYCHOLOGIST

## 2021-12-03 ENCOUNTER — VIRTUAL VISIT (OUTPATIENT)
Dept: PSYCHIATRY | Facility: CLINIC | Age: 61
End: 2021-12-03
Attending: PSYCHOLOGIST
Payer: COMMERCIAL

## 2021-12-03 DIAGNOSIS — F32.1 CURRENT MODERATE EPISODE OF MAJOR DEPRESSIVE DISORDER, UNSPECIFIED WHETHER RECURRENT (H): Primary | ICD-10-CM

## 2021-12-03 DIAGNOSIS — F41.1 GENERALIZED ANXIETY DISORDER: ICD-10-CM

## 2021-12-03 PROCEDURE — 90837 PSYTX W PT 60 MINUTES: CPT | Mod: GT | Performed by: PSYCHOLOGIST

## 2021-12-12 NOTE — PROGRESS NOTES
"Individual Therapy, 55 minutes  Referred from [facility]  Date:             11/12/21  Diagonosis:  Major Depression, moderate, Generalized anxiety, Alcohol dependency, in full Remission    Video-Visit Details    Type of service:  Video Visit    Video Start Time (time video started): 4:05pm    Video End Time (time video stopped): 5pm    Originating Location (pt. Location): Pts home    Distant Location (provider location):  provider's home    Mode of Communication:  Video Conference via Zoom    Physician has received verbal consent for a Video Visit from the patient? 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 When he came to therapy he had been fired from a firm  which he had been working forseveral years \"going around\"  these requirements. He was severely depressed at this time--Unemployed and socially isolated. He had lots of conflicts with his wife who reportedly  berated him for not bringing in a regular income. After about 6 months of therapy Mr. Aburto had dropped out of therapy and reportedly began drinking large quanties of alcohol to \"escape\" and on Thanksgiving, 11/25/2019 his wife helped him get into detox. His sober date is 11/26/2019. Mr. Aburto  has worked very hard over the last 2 years to gain confidence, self-respect and the respect of others including his wife, daughter and his previous boss and colleagues. 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 Mr Aburto  has a pattern of cowering to his wife and feeling shame as she criticizes him reportedly  on a daily " "basis. He has lacked assertiveness and feels lonely and insecure around her. Her family is critical of him and know all of his \"bad deeds\"  around drinking and employment even though now he is financially bringing in more money than his wife, has been sober for almost 2 years and has followed through with every request his wife has made of him from painting the kitchen and doing numerous home repairs to scrubbing his wive's bathtub 2-3 times a week for her (wife has symptoms of OCD)--she checks his cleaning of the tub ongoing and berates him on whether he completed this fully. Mr Aburto  identifies with the need to \"prove himself\" and does not feel impowered to stand up for himself nor the fact that his wife's drinking of wine has increased through the years and their weekly 'fights\" and daily criticsm appears to be related to her alcohlo use.l. Mr. Aburto  has attempted through therapy to maintain boundaries and assert himself-this has been very difficult for him to do. Mr. Aburto  has connections through AA but lacks male friendships. The relationship with his 29 year old daughter had become strained due to his alcohol use and the berating done by his wife who reportedly had poor boundaries and may have used their daughter as a confidant.     Currently there has also been tension between his daughter-in-law (daughter's wife, Zack) due to his Mr. Aburto's  Wife's intrusive behaviors and criticsms of.Molly Kaur is an  woman who is described as outspoken and dominant in the relationship with  his daughter (trell) which is stressful for his wife (Corina). Another huge stressor at this time for Mr. Aburto is that his Daughter, who is in her first year of medical school, had a first episode of psychosis back in late August. She lives in [place] with her wife Natasha who is now months pregnant. Navigating his daughter's psychiatric hospitalization and his limited role in his life and his wife, " "Corina's lack of boundaries and crticsms. had been extremetly stressful to Mr. Aburto.  He has been fearful that [place] would keep him and Corina from being grandparents to their only child's baby.     Family of Origin/ Support System:  Mr. Aburto comes from a large \"dysfunctional\" family with 2 other brothers and possibly a sister who has chemical dependency issues. His closest brother reportedly became sober 2 days before Mr. Aburto back in 2019 and told  over the phone once he was in treatment. Mr. Aburto describes a \"lack of closeness\" in his family. Mr. Aburto has been  to Corina for over 30 years. Radha (29) is his only child. Radha  in a private ceremony in [place] in 2018 and now has a new baby girl due to be born 12/16/2021 with her wife Natasha that is carrying the baby. Radha, , who is in her first year of medical school, had a first episode of psychosis  in late August, 2021. She lives in [place] with her wife Natasha who is now 8nmonths pregnant. Navigating his daughter's psychiatric hospitalization and his limited role in her life and his wife, Corina's lack of boundaries and crticsms. had been extremetly stressful to Mr. Aburto.  He has been fearful that [place] would keep him and Corina from being grandparents to their only child's baby. Mr Aburto's wife is described as having OCD characteristics around cleaning/germs ie the tub that she demands he wash and re-wash 3-4x a week. She is on faculty at the [employer] and does not drive. Mr Aburto has had over many years  her to and from work at the [employer]--Now with the Covid-19  Pandemic is less often, however, with her work schedule being different than his now with his return to work, this has been an added burden for him.      attends AA groups 3-4x a week  and sees his sponsor weekly--this is his support network other than a couple of friends of his wife Elle whom they on occassion go " "out to dinner together which has been difficult due to the pandemic and more recently the realization of his wife's alcoholic behaviors including the criticism , impact on memory and her own lack of energy now. He needs male friendships and hobbies in his life.     Medical History/Treatment and Therapy:  Mr. Aburto has in the past year gotten hearing aids which he reports are very helpful with work and in hearing his wife. He is still fine-tuning these with accomodations needed at work with his computer--it is difficult sometimes to hear in group meetings at work. Mr. 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  Mr Aburto completed Chemical Dependency at [facility] and then has been involved in an aftercare group. His sober date is 11/26/19.  He attends AA groups 3-4x a weeks and has one main AA sponsor he is very close to and another back-up sponsor. He accepted the role of  of one of his AA group last August.  chelsea Aburto reports he is up to date with his Covid-19 vaccinations and booster. He goes to annual medical check-ups and dental.    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 In 2000 he did not set up license--for 10 years not licensed. 2010 found out. (I 'maintained the lie for 10 years.\")  Although he never signed off of a project legally without his credentials, he has regrets and shame around hound deceitful he ended up being as he went around the rules. To his credit, once he got sober 2 years ago he humbly approached a previous co-worker regarding the possibility of getting rehired or hired in a different position even part-time to prove himself. Mr. Aburto took all steps needed to both reinstate his engineering credentials and then " "one by one and then with the board met with all to humbling apologize and make relationship repairs--and was eventually hired on full-time almost a year ago. This transition back to work during the pandemic had its challenges as well as the opportunity to prove himself and look at a different way of interacting with others--\"not as a know-t all\" like he did previously. He has been working real hard to maría in on his listening and validation skills.    Other issues discussed:    1.  Zoom baby shower went well. His daughter and wife had the shower in the backyard of their home with local friends and a friend from  .  No one of [place] family attended the zoom shower but sent presents.    2. His wife, Corina> is working on \"not making any comments to [facility] about her mother or anything around parenting or their decisions\"   3.Increase in assertiveness and speaking up for himself--recently told wife he will scrub the floor and when he would do it. When she reminded him--instead of getting mad he repeated what he had said before \"broken record.\"   If wife call his a name or put down--he is ignoring and not responding to her until she asks nicely and is kind. The option of having a session with wife was discussed again--for him to clearly set up so rules of engagement and writed them down. Perhaps a neutra couples's therapist.  4. Wife has asked him \"don't let me drink tomorrow\"  B/c she has to get up early or things to do.--co-dependency issues. Wife has said she will NOT quit drinking until Mr. Aburto quits smoking. She also smokes. He feels that he could quit smoking if she did.   5. Mr. Aburto asks himself before he talks to wife/trying to implement:  \"Does this 'need to be said?'\" Aim I the person to say it?\" Is this the right time to say it?\"    Mental Status examine (MSI): Pt is is oriented X3. Verbal and engaging, speech is fluid with  Good breadth of vocabulary and intonations.  Concerns that he   Avoids speaking " "up to his wife .    Assessment:  Mr. Aburto came on time. Verbly engaging. Mr. Aburto has worked very hard on his sobriety along with changing his alcoholic behaviors of \"avoidance\" and \"getting in arguments with my wife.\"He attends AA groups 3-4x a week an meets with his sponsor weekly.  He seemed very proud of getting his 2-year medallion from his AA sponsor. He is very excited to be a grandfather and very nervous having his daughter out of state since her first episode psychosis this past August.. His daughter has taken a break from Medical school 9 BRIANA/ 0 for now. Lots of anxiety dealing with wife who appears to be an active alcoholic. He is continuing to work on skills to stand up for himself and not absorb verbal abuse. Or tensions of his wife towards daughter in-law. Continued work needed on not ruminating and avoiding conflict. He needs oo look at  his own level of anxiety.    Plan:  Continue to see bi[facility]ly in the [facility].    Goals:  1.  Continue to learn and practice skills to  Manage his  anxiety and depression.  2. Increase assertiveness and standing up for himself with his wife who appears to hold on to the past when he was unemployed and  Not getting any positive feedback of how he has improved is difficult--he needs   To work on Self-validation.   3. Make 2 friends he can meet with and possibly do a hobby together.         "

## 2022-01-10 NOTE — PROGRESS NOTES
"Individual Therapy, 55 minutes  [facility]  Date:             12/03/21    Diagonosis:  Major Depression, moderate, Generalized anxiety, Alcohol dependency, in full Remission    Video-Visit Details    Type of service:  Video Visit    Video Start Time (time video started): 4:05pm    Video End Time (time video stopped): 5pm    Originating Location (pt. Location): Pts home    Distant Location (provider location):  Provider's home    Mode of Communication:  Video Conference via zoom    Physician has received verbal consent for a Video Visit from the patient? {Yes      Rocio Benavides, PhD LP    S/O:  \"I have been sober 2 years--my sponsor gave me the medallion. It was really nice.\" Bert came in and stated above. (Sober date is 11/26/19) He described the events leading up to his brother letting him know that he had gotten sober just a few days after Bert. He was in contact with his brother and went to the AA meeting where his brother got his medallion also (zoom allows this to happen). He feels closer to his brother around the sobriety. When he was in [place] he went to an AA meeting with his brother. Discussed all the positive changes Bert has made in his life since he became sober and the consequences of his drinking and \"living lies\" or \"omission of the truth\" around his license etc. He has really turned his life around and even got rehired to his previous firm that had to let him go 2 years prior.    Other issues discussed:  1. Relationship with his wife--standing up for himself and asking for more respect. How can he cont to set boundaries?  2. Up coming birth of his first grandchild. His daughter's wife is having the baby--due 12/16/2021. They are doing Sunday calls. He is taking the lead and his wife is showing him more respect around this. He is mediating the tensions between daughter in law and wife.  3. Wife's drinking problem--their fights are when she is drinking. " "Settting guidelines on when he will discuss issues--before she drinks or before she has a second drink. By the time he gets home most nites she has been drinking more than 2. On one hand he does not want to take her inventory--on the other hand----he is setting boundaries/limits to what he will tolerate since there is a pattern---after 2 drinks at nite they have an argument and she threatens divorce.  4. Friendships--who are his male friends. He needs to cultivate old/new sober friends---if wife does not want to be around him or treats him poorly she needs positive people around him.    5. Daughter is on BRIANA from medical school due to psychotic episode at the early August. She is pursuing a daytime job for structure and to clear her brain. Discussed the \"brain healing.\" Importance of daughter not feeling stress--and not stress between her wife and mother.        Assessment;  Bert came on time . Verbal and engaging. Bert seemed very proud of his 2 year sobriety and continues to be actively involved in AA as  and in keeping his sobriety.  Excitement around becoming grandparents yet pending stress if his wife gets drunk and says the wrong this to their daughter in law who is black and has a different cultural background. His wife had an open conflict with the mother-in-law which could make it difficult to go and visit the baby if the other mother is there. Bert is taking guidance on how to facilitate family interactions and do a level of family therapy--setting up rules of engagements and he monitors the Sunday phone calls.     Plan: Continue to see bi-weekly in the [facility] to work on goals.  Assignment: . Talk to wife regarding his guidelines in having any discussions---either not after 7pm or \"need to talk before the second cocktail or glass of wine\" since the arguments happen after she has had greater than 2. Write it out and read? He no longer purchases her liquor?? (wife does not drive). When is he " going to stop smoking?      Goals: Treatment plan:   1.  Continue to learn and practice skills to  Manage his  anxiety and depression.  2. Increase assertiveness and standing up for himself with his wife who appears to hold on to the past when he was unemployed and  Not getting any positive feedback of how he has improved is difficult--he needs   To work on Self-validation.   3. Make 2 friends he can meet with and possibly do a hobby together.

## 2022-01-21 ENCOUNTER — VIRTUAL VISIT (OUTPATIENT)
Dept: PSYCHIATRY | Facility: CLINIC | Age: 62
End: 2022-01-21
Attending: PSYCHOLOGIST
Payer: COMMERCIAL

## 2022-01-21 DIAGNOSIS — F41.1 GENERALIZED ANXIETY DISORDER: ICD-10-CM

## 2022-01-21 DIAGNOSIS — F32.1 CURRENT MODERATE EPISODE OF MAJOR DEPRESSIVE DISORDER, UNSPECIFIED WHETHER RECURRENT (H): Primary | ICD-10-CM

## 2022-01-21 PROCEDURE — 90837 PSYTX W PT 60 MINUTES: CPT | Mod: GT | Performed by: PSYCHOLOGIST

## 2022-02-04 ENCOUNTER — VIRTUAL VISIT (OUTPATIENT)
Dept: PSYCHIATRY | Facility: CLINIC | Age: 62
End: 2022-02-04
Attending: PSYCHOLOGIST
Payer: COMMERCIAL

## 2022-02-04 DIAGNOSIS — F41.1 GENERALIZED ANXIETY DISORDER: ICD-10-CM

## 2022-02-04 DIAGNOSIS — F32.1 CURRENT MODERATE EPISODE OF MAJOR DEPRESSIVE DISORDER, UNSPECIFIED WHETHER RECURRENT (H): Primary | ICD-10-CM

## 2022-02-04 PROCEDURE — 90837 PSYTX W PT 60 MINUTES: CPT | Mod: GT | Performed by: PSYCHOLOGIST

## 2022-02-05 NOTE — PROGRESS NOTES
"Individual Therapy, 55 minutes  [facility] from [facility]  Date:             2/4/2022    Diagonosis:  Major Depression, moderate, Generalized anxiety, Alcohol dependency, in full Remission    Video-Visit Details    Type of service:  Video Visit    Video Start Time (time video started): 4:05pm    Video End Time (time video stopped): 5pm    Originating Location (pt. Location): Pts home    Distant Location (provider location):  Provider's home    Mode of Communication:  Video Conference via zoom    Physician has received verbal consent for a Video Visit from the patient? {Yes      Rocio Benavides, PhD LP    S/O:  \"The minute I come in the door when I get home from work I am told to 'come here and sit down, if I say I want to get a glass of water I am told no.'\" \"Corina then starts telling me everything she has done around the house--scrub floor, made dinner etc--she then scolds me saying that \"you haven't done enough.'\" \"I am not happy.\" \"I wonder how she got all of that done when she said she \"just got home\" and \"seems drunk.\"  Bert came in and stated above and described how \"something needs to change---I don't want to go home.\"  He described how this makes him feel more depressed and it is impacting his work. We discussed how he has tried for almost 2 years to make changes in addition to his over 2 years of sobriety including \"no yelling, doing more tasks at home besides the repairs and remodels\" and his wife tells him \"things are worse \" and \"every other day almost\"  tells him either \"I want a divorce\", \"I am going to leave\" or compare me to other people whom have a great .\" Corina reportedly also tells him how much her sister \"hates\" him.    We outlined options:  1. Keep the same and do nothing until more uncomfortable or things get unbearable  2.suggest couples session--with her therapist, his therapist (me) or neutral couple's counselor--to assess need for couple's " "counseling  3. Suggest, give a timeline to make the decision to have couple's counseling--that this is urgent for the marriage to continue. Remember that his goal is to stay  so he need to tell her and remind her that he wants to stay  and the impact of the scolding and threats of divorce is making him depressed and affecting his work. \"I don't know what to do, I still am not doing the right things and I don't want to get more depressed.\"   4.If all is turned down and Corina is unwilling to do couple's counseling or at least a couples' session then even though he wants the marriage he will need a trial separation to see what it will be like not being scolded. Reminding her it is the only thing he can think of if there is not an intervention as a couple. He will also need to the NOT drive her anywhere and NOT go to [place] with her to see granddaughter. He would go alone. (She also argued, put him down in front of daughter who felt the tension and commented. This is NOT ok in front of daughter who is in recovery from psychotic episode nor with granddaughter present who can feel the tension too even though she is a .    Other issues discussed:  1. Relationship with his wife--He has continued in the past 2 weeks trying to stand up for himself and asking for more respect.Fearful of her anger and reactions. Last session he reported that wife told him she was going to \"stop drinking\" for a while. He was to encourage and support openly asking if it starts that day \"Friday\"--Bert reported that she then said 'tomorrow\" for the next 2 days and then he never brought up. She may NOT be able to--I encourage him to say this out-loud to her when she is NOT drinking and show his concern.   2. So happy being a grandpa--Reji (first grandchild).born 2021.Plans to go see daughter and wife and Reji early March.  3. Wife's drinking problem and increase OCD behaviors around cleaning--their fights " "are when she is drinking. Settting guidelines on when he will discuss issues--before she drinks or before she has a second drink--not able to enforce this since wife denies. . By the time he gets home most nites she has been drinking more than 2.   4. Friendships--who are his male friends? No movement in this area.  He needs to cultivate old/new sober friends---if wife does not want to be around him or treats him poorly she needs positive people around him.      Assessment;  Bert came on time . Verbal and engaging. Bert reportedly feels \"more depressed\" and helpless in making changes in his life. He noted the impact of marital difficultiess on his productivity at work with his wive's alcohol use, OCD symptoms and being told several times a week that she wants a divorce and doesn't respect him.Yet, wife does not drives and appears to be co-odependent on him.  Bert is taking guidance on how to facilitate family interactions and do a level of family therapy--setting up rules of engagements and he monitors the Sunday phone calls with daughter/her wife and [place] wife who has been conflictual with daughter in law. .     Plan: Continue to see bi-weekly in the [facility] to work on goals.  Assignment: .Write out what he wants to tell his wife--outline the 4 options above regarding having couples' counseling and/or move to a planned separation. Include several statements on how his still loves her and wants to stay . If he chooses to read this to her prior to next session make sure she has NOT been drinking and not  after 7pm or \"need to talk before the second cocktail or glass of wine\" since the arguments happen after she has had greater than  He no longer purchases her liquor?? (wife does not drive). When is he going to stop smoking?      Goals: Treatment plan:   1.  Continue to learn and practice skills to  Manage his  anxiety and depression.  2. Increase assertiveness and standing up for himself with his wife who " appears to hold on to the past when he was unemployed and  Not getting any positive feedback of how he has improved is difficult--he needs   To work on Self-validation.   3. Make 2 male friends he can meet with and possibly do a hobby together.

## 2022-02-17 ENCOUNTER — VIRTUAL VISIT (OUTPATIENT)
Dept: PSYCHIATRY | Facility: CLINIC | Age: 62
End: 2022-02-17
Attending: PSYCHOLOGIST
Payer: COMMERCIAL

## 2022-02-17 DIAGNOSIS — F41.1 GENERALIZED ANXIETY DISORDER: ICD-10-CM

## 2022-02-17 DIAGNOSIS — F32.1 CURRENT MODERATE EPISODE OF MAJOR DEPRESSIVE DISORDER, UNSPECIFIED WHETHER RECURRENT (H): Primary | ICD-10-CM

## 2022-02-17 PROCEDURE — 90837 PSYTX W PT 60 MINUTES: CPT | Mod: GT | Performed by: PSYCHOLOGIST

## 2022-02-28 NOTE — PROGRESS NOTES
"Individual Therapy, 55 minutes  [facility]  Date:             12/03/21    Diagonosis:  Major Depression, moderate, Generalized anxiety, Alcohol dependency, in full Remission    Video-Visit Details    Type of service:  Video Visit    Video Start Time (time video started): 12:05pm    Video End Time (time video stopped): 1pmpm    Originating Location (pt. Location): Pts home    Distant Location (provider location):  Provider's home    Mode of Communication:  Video Conference via zoom    Physician has received verbal consent for a Video Visit from the patient? Yes      Rocio Benavides, PhD LP    S/O:  \"I have been sober 2 years--my sponsor gave me the medallion. It was really nice.\" Bert came in and stated above. (Sober date is 11/26/19) He described the events leading up to his brother letting him know that he had gotten sober just a few days after Bert. He was in contact with his brother and went to the AA meeting where his brother got his medallion also (zoom allows this to happen). He feels closer to his brother around the sobriety. When he was in [place] he went to an AA meeting with his brother. Discussed all the positive changes Bert has made in his life since he became sober and the consequences of his drinking and \"living lies\" or \"omission of the truth\" around his license etc. He has really turned his life around and even got rehired to his previous firm that had to let him go 2 years prior.    Other issues discussed:  1. Relationship with his wife--standing up for himself and asking for more respect. How can he cont to set boundaries?  2. Birth of his first grandchild. His daughter's wife gave birth  12/16/2021 Reji. . They were able to go out and meet their grandchild and he shared gorgeous pictures.  He is the  of the discussions and making sure not conflicts with wife and daughter in law.   3. Wife's drinking problem--their fights are when she is drinking. " "Bert described his difficulties holding to the guidelines and keeping the boundaries on  on when he will discuss issues--before she drinks or before she has a second drink.These guidelines were since there is a pattern---after 2 drinks at nite they have an argument and she threatens divorce.  4. Friendships--who are his male friends. He needs to cultivate old/new sober friends---if wife does not want to be around him or treats him poorly she needs positive people around him.  5. Daughter is on BRIANA from medical school due to psychotic episode at the early August. She is pursuing a daytime job for structure and to clear her brain. Discussed the \"brain healing.\" Importance of daughter not feeling stress--and not stress between her wife and mother.    6. Work going well-many nights not home until 7pm---to avoid wife?      Assessment;  Bert came on time . Verbal and engaging. continues to be actively involved in AA as  and in keeping his sobriety (2 yrs now). . Very proud to be a grandpa--really wishing  his wife would stop drinking but he is not going to ask her to. Wife says things to him and his   Daughter-in -law who is black and has a different cultural background when drunk. . His wife had an open conflict with the mother-in-law -- Bert is taking guidance on how to facilitate family interactions and do a level of family therapy--setting up rules of engagements and he monitors the Sunday phone call.    Plan: Continue to see bi-weekly in the [facility] to work on goals.  Assignment: . Talk to wife regarding his guidelines in having any discussions---either not after 7pm or \"need to talk before the second cocktail or glass of wine\" since the arguments happen after she has had greater than 2. Write it out and read? He no longer purchases her liquor?? (wife does not drive). When is he going to stop smoking?      Goals: Treatment plan:   1.  Continue to learn and practice skills to  Manage his  anxiety and " depression.  2. Increase assertiveness and standing up for himself with his wife who appears to hold on to the past when he was unemployed and  Not getting any positive feedback of how he has improved is difficult--he needs   To work on Self-validation.   3. Make 2 friends he can meet with and possibly do a hobby together.

## 2022-03-07 NOTE — PROGRESS NOTES
"Individual Therapy, 55 minutes  [facility]  Date:             2/17/2022    Diagonosis:  Major Depression, moderate, Generalized anxiety, Alcohol dependency, in full Remission    Video-Visit Details    Type of service:  Video Visit    Video Start Time (time video started): 4:05pm    Video End Time (time video stopped): 5pm    Originating Location (pt. Location): Pts home    Distant Location (provider location):  Provider's home    Mode of Communication:  Video Conference via zoom    Physician has received verbal consent for a Video Visit from the patient? {Yes      Rocio Benavides, PhD LP    S/O:\"Corina (wife) fell on the ice and broke her leg.\"  Bert came in and stated above and that this happened at home on their steps. He moved the appt to a day early so he can take his wife back to the doctor.    Bert reported that \"now I have to do everything.\" We discussed how this may be a great time for him to stand up for himself and make sure he is not called names. It is hard to help someone if you are being called names and told you  \"don't do anything right.\" He reported that he never got a chance or \"never took the chance\" to talk to her yet about last session and how \"unhappy\" he is   as he described last session that -she scolds me saying that \"you haven't done enough.'\" \"I am not happy.\" \"I wonder how she got all of that done when she said she \"just got home\" and \"seems drunk.\"  Last session he talked about how   \"something needs to change---I don't want to go home.\"   We discussed how he has tried for almost 2 years to make changes in addition to his over 2 years of sobriety including \"no yelling, doing more tasks at home besides the repairs and remodels\" and his wife tells him \"things are worse \" and \"every other day almost\"  tells him either \"I want a divorce\", \"I am going to leave\" or compare me to other people whom have a great .\" Corina reportedly also tells " "him how much her sister \"hates\" him.    Last session --We outlined options:  1. Keep the same and do nothing until more uncomfortable or things get unbearable  2.suggest couples session--with her therapist, his therapist (me) or neutral couple's counselor--to assess need for couple's counseling  3. Suggest, give a timeline to make the decision to have couple's counseling--that this is urgent for the marriage to continue. Remember that his goal is to stay  so he need to tell her and remind her that he wants to stay  and the impact of the scolding and threats of divorce is making him depressed and affecting his work. \"I don't know what to do, I still am not doing the right things and I don't want to get more depressed.\"   4.If all is turned down and Corina is unwilling to do couple's counseling or at least a couples' session then even though he wants the marriage he will need a trial separation to see what it will be like not being scolded. Reminding her it is the only thing he can think of if there is not an intervention as a couple. He will also need to the NOT drive her anywhere and NOT go to [place] with her to see granddaughter. He would go alone. (She also argued, put him down in front of daughter who felt the tension and commented. This is NOT ok in front of daughter who is in recovery from psychotic episode nor with granddaughter present who can feel the tension too even though she is a .    Other issues discussed:  1. Wife did not stop drinking last month when she said she was and asked Bert for help. He reports she is drinking \"less\"--she is at risk for falling.  2. So happy being a grandpa--Reji (first grandchild).born 2021.Plans to go see daughter and wife and Reji early March.-now with the broken leg don't know   3 Friendships--who are his male friends? No movement in this area.  He needs to cultivate old/new sober friends---if wife does not want to be around him or " "treats him poorly she needs positive people around him.  4. Work is busy and going well.       Assessment;  Bert came on time . Verbal and engaging. Bert reportedly 2 weeks ago he was  \"more depressed\" and helpless in making changes in his life.And now with his wife falling and breaking her leg he is overwhelmed \"doing everything.\"Perhaps this is the time for change--wife is now \"dependent\" on him for almost everything including dressing her?? His  wive's alcohol use, OCD symptomsmay need to be addressed now? Dioes she still want a a divorce and doesn't respect him.    Plan: Continue to see bi-weekly in the [facility] to work on goals.  Assignment--he never completed--last session : .Write out what he wants to tell his wife--outline the 4 options above regarding having couples' counseling and/or move to a planned separation. Include several statements on how his still loves her and wants to stay . If he chooses to read this to her prior to next session make sure she has NOT been drinking and not  after 7pm or \"need to talk before the second cocktail or glass of wine\" since the arguments happen after she has had greater than  He no longer purchases her liquor?? (wife does not drive). When is he going to stop smoking?      Goals: Treatment plan:   1.  Continue to learn and practice skills to  Manage his  anxiety and depression.  2. Increase assertiveness and standing up for himself with his wife who appears to hold on to the past when he was unemployed and  Not getting any positive feedback of how he has improved is difficult--he needs   To work on Self-validation.   3. Make 2 male friends he can meet with and possibly do a hobby together.         "

## 2022-04-01 ENCOUNTER — VIRTUAL VISIT (OUTPATIENT)
Dept: PSYCHIATRY | Facility: CLINIC | Age: 62
End: 2022-04-01
Attending: PSYCHOLOGIST
Payer: COMMERCIAL

## 2022-04-01 DIAGNOSIS — F12.10 CANNABIS ABUSE: ICD-10-CM

## 2022-04-01 DIAGNOSIS — F33.0 MILD EPISODE OF RECURRENT MAJOR DEPRESSION DURING INFANCY TO EARLY CHILDHOOD (H): ICD-10-CM

## 2022-04-01 DIAGNOSIS — F32.1 CURRENT MODERATE EPISODE OF MAJOR DEPRESSIVE DISORDER, UNSPECIFIED WHETHER RECURRENT (H): Primary | ICD-10-CM

## 2022-04-01 PROCEDURE — 90837 PSYTX W PT 60 MINUTES: CPT | Mod: GT | Performed by: PSYCHOLOGIST

## 2022-04-15 ENCOUNTER — VIRTUAL VISIT (OUTPATIENT)
Dept: PSYCHIATRY | Facility: CLINIC | Age: 62
End: 2022-04-15
Attending: PSYCHOLOGIST
Payer: COMMERCIAL

## 2022-04-15 DIAGNOSIS — F41.1 GENERALIZED ANXIETY DISORDER: ICD-10-CM

## 2022-04-15 DIAGNOSIS — F32.1 CURRENT MODERATE EPISODE OF MAJOR DEPRESSIVE DISORDER, UNSPECIFIED WHETHER RECURRENT (H): Primary | ICD-10-CM

## 2022-04-15 PROCEDURE — 90837 PSYTX W PT 60 MINUTES: CPT | Mod: GT | Performed by: PSYCHOLOGIST

## 2022-04-19 NOTE — TELEPHONE ENCOUNTER
REFERRAL INFORMATION:    Referring Provider:  N/A    Referring Clinic:  N/A    Reason for Visit/Diagnosis: Inguinal hernia        FUTURE VISIT INFORMATION:    Appointment Date: 4/22/2022    Appointment Time: 8:30 AM      NOTES RECORD STATUS  DETAILS   OFFICE NOTE from Referring Provider N/A    OFFICE NOTE from Other Specialists Care Everywhere 4/19/2022 [facility] Nurse Triage with TREVOR Lazaro    2/24/2022 Office visit with TREVOR Lazaro (Erica)     HOSPITAL DISCHARGE SUMMARY/ ED VISITS  N/A    OPERATIVE REPORT N/A    ENDOSCOPY (EGD)  N/A    PERTINENT LABS Care Everywhere    PATHOLOGY REPORTS (RELATED) N/A    IMAGING (CT, MRI, US, XR)  N/A

## 2022-04-22 ENCOUNTER — PRE VISIT (OUTPATIENT)
Dept: SURGERY | Facility: CLINIC | Age: 62
End: 2022-04-22

## 2022-04-25 NOTE — PROGRESS NOTES
"Individual Therapy, 55 minutes  [facility]  Date:            4/1/22    Diagonosis:  Major Depression, moderate, Generalized anxiety, Alcohol dependency, in full Remission    Video-Visit Details    Type of service:  Video Visit    Video Start Time (time video started): 4:05pm    Video End Time (time video stopped): 5pm    Originating Location (pt. Location): Pts home    Distant Location (provider location):  Provider's home    Mode of Communication:  Video Conference via zoom    Physician has received verbal consent for a Video Visit from the patient? {Yes      Rocio Benavides, PhD LP    S/O:\"I am still dealing with Corina (wife)who  fell on the ice and broke her leg and has not been mobile so I have been working at home most all of the time caring for her,.\"  Bert came in and stated above and that this happened at home on their steps. Bert then reported that his daughter had another psychotic episode and went back in the hospital which is why he cancelled last session. He decided that he couldn't go and visit her with Corina needing help daily.\" He then reported that his daughter decided to quit medical school and move back to [place] with her wife and baby. He is thrilled about this. We talked about options with enrollment with disability services and perhaps she wants to get into public health.      On hold regarding his wife talking so mean to him and telling him he \"does nothing \" and wanting diorce.  --We outlined options:  1. Keep the same and do nothing until more uncomfortable or things get unbearable  2.suggest couples session--with her therapist, his therapist (me) or neutral couple's counselor--to assess need for couple's counseling  3. Suggest, give a timeline to make the decision to have couple's counseling--that this is urgent for the marriage to continue. Remember that his goal is to stay  so he need to tell her and remind her that he wants to stay  " "and the impact of the scolding and threats of divorce is making him depressed and affecting his work. \"I don't know what to do, I still am not doing the right things and I don't want to get more depressed.\"   4.If all is turned down and Corina is unwilling to do couple's counseling or at least a couples' session then even though he wants the marriage he will need a trial separation to see what it will be like not being scolded. Reminding her it is the only thing he can think of if there is not an intervention as a couple. He will also need to the NOT drive her anywhere and NOT go to [place] with her to see granddaughter. He would go alone. (She also argued, put him down in front of daughter who felt the tension and commented. This is NOT ok in front of daughter who is in recovery from psychotic episode nor with granddaughter present who can feel the tension too even though she is a .    Other issues discussed:  1. Wife did not stop drinking after making a commitment 2 months ago  when she said she was and asked Bert for help. He reports she is drinking \"less\"--she is at risk for falling.  2. So happy being a grandpa--Reji (first grandchild).born 2021.Plans to go see daughter and wife and Reji early March.-now with the broken leg don't know   3 Friendships--who are his male friends? No movement in this area.  He needs to cultivate old/new sober friends---if wife does not want to be around him or treats him poorly she needs positive people around him.  4. Work is busy and going well.       Assessment;  Bert came on time . Verbal and engaging. Bert reportedly 2 weeks ago he was  \"more depressed\" and helpless in making changes in his life.And now with his wife falling and breaking her leg he is overwhelmed \"doing everything.\"Perhaps this is the time for change--wife is now \"dependent\" on him for almost everything including dressing her?? His  wive's alcohol use, OCD symptomsmay need to be " "addressed now? Ady she still want a a divorce and doesn't respect him.    Plan: Continue to see bi-weekly in the [facility] to work on goals.  Assignment--he never completed--last session : .Write out what he wants to tell his wife--outline the 4 options above regarding having couples' counseling and/or move to a planned separation. Include several statements on how his still loves her and wants to stay . If he chooses to read this to her prior to next session make sure she has NOT been drinking and not  after 7pm or \"need to talk before the second cocktail or glass of wine\" since the arguments happen after she has had greater than  He no longer purchases her liquor?? (wife does not drive). When is he going to stop smoking?    this is on hole. Deal with daughter and the move to [place].    Goals: Treatment plan:   1.  Continue to learn and practice skills to  Manage his  anxiety and depression.  2. Increase assertiveness and standing up for himself with his wife who appears to hold on to the past when he was unemployed and  Not getting any positive feedback of how he has improved is difficult--he needs   To work on Self-validation.   3. Make 2 male friends he can meet with and possibly do a hobby together.         "

## 2022-05-02 NOTE — PROGRESS NOTES
"Individual Therapy, 55 minutes  [facility]  Date:            4/15/22    Diagonosis:  Major Depression, moderate, Generalized anxiety, Alcohol dependency, in full Remission    Video-Visit Details    Type of service:  Video Visit    Video Start Time (time video started): 4:05pm    Video End Time (time video stopped): 5pm    Originating Location (pt. Location): Pts home    Distant Location (provider location):  Provider's home    Mode of Communication:  Video Conference via zoom    Physician has received verbal consent for a Video Visit from the patient? {Yes      Rocio Benavides, PhD LP    S/O:\"I did go into work a couple of days this week.\"\" I am still dealing with Corina--but I felt like she is more mobile and I could leaver her alone.)  (wife   fell on the ice and broke her leg and has not been mobile).  Bert came in and stated above .  Bert  reported simultaneous stressors around  his daughter having  had another psychotic episode and went back in the hospital. He reported that his daughter and her wife and their new baby daughter are still planning to move back to [place] in May/June.He has decided not to fly down and help them move. .     On hold regarding his wife talking so mean to him and telling him he \"does nothing \" and wanting diorce. He believes that she is trying. He did speak up a little to her.    --We outlined options:  1. Keep the same and do nothing until more uncomfortable or things get unbearable  2.suggest couples session--with her therapist, his therapist (me) or neutral couple's counselor--to assess need for couple's counseling  3. Suggest, give a timeline to make the decision to have couple's counseling--that this is urgent for the marriage to continue. Remember that his goal is to stay  so he need to tell her and remind her that he wants to stay  and the impact of the scolding and threats of divorce is making him depressed and affecting his " "work. \"I don't know what to do, I still am not doing the right things and I don't want to get more depressed.\"   4.If all is turned down and Corina is unwilling to do couple's counseling or at least a couples' session then even though he wants the marriage he will need a trial separation to see what it will be like not being scolded. Reminding her it is the only thing he can think of if there is not an intervention as a couple. He will also need to the NOT drive her anywhere and NOT go to [place] with her to see granddaughter. He would go alone. (She also argued, put him down in front of daughter who felt the tension and commented. This is NOT ok in front of daughter who is in recovery from psychotic episode nor with granddaughter present who can feel the tension too even though she is a .    Other issues discussed:  1. Wife did not stop drinking after making a commitment 2 months ago  when she said she was and asked Bert for help. He reports she is drinking \"less\"--she is at risk for falling.  2. So happy being a grandpa--Reji (first grandchild).born 2021.Plans to go see daughter and wife and Reji early March.-now with the broken leg don't know   3 Friendships--who are his male friends? No movement in this area.  He needs to cultivate old/new sober friends---if wife does not want to be around him or treats him poorly she needs positive people around him.  4. Work is busy and going well.       Assessment;  Bert came on time . Verbal and engaging. Bert reported that he is less depressed now--more depressed\" and helpless in making changes in his life.And now with his wife falling and breaking her leg he is overwhelmed \"doing everything.\"Perhaps this is the time for change--wife is now \"dependent\" on him for almost everything including dressing her?? His  wive's alcohol use, OCD symptomsmay need to be addressed now? Ady she still want a a divorce and doesn't respect him.    Plan: Continue to " "see bi-weekly in the [facility] to work on goals.  Assignment--he never completed--last session : .Write out what he wants to tell his wife--outline the 4 options above regarding having couples' counseling and/or move to a planned separation. Include several statements on how his still loves her and wants to stay . If he chooses to read this to her prior to next session make sure she has NOT been drinking and not  after 7pm or \"need to talk before the second cocktail or glass of wine\" since the arguments happen after she has had greater than  He no longer purchases her liquor?? (wife does not drive). When is he going to stop smoking?    this is on hole. Deal with daughter and the move to [place].    Goals: Treatment plan:   1.  Continue to learn and practice skills to  Manage his  anxiety and depression.  2. Increase assertiveness and standing up for himself with his wife who appears to hold on to the past when he was unemployed and  Not getting any positive feedback of how he has improved is difficult--he needs   To work on Self-validation.   3. Make 2 male friends he can meet with and possibly do a hobby together.         "

## 2022-05-06 ENCOUNTER — VIRTUAL VISIT (OUTPATIENT)
Dept: PSYCHIATRY | Facility: CLINIC | Age: 62
End: 2022-05-06
Attending: PSYCHOLOGIST
Payer: COMMERCIAL

## 2022-05-06 DIAGNOSIS — F33.0 MILD EPISODE OF RECURRENT MAJOR DEPRESSION DURING INFANCY TO EARLY CHILDHOOD (H): ICD-10-CM

## 2022-05-06 DIAGNOSIS — F10.939 ALCOHOL WITHDRAWAL WITH COMPLICATION WITH INPATIENT TREATMENT, WITH UNSPECIFIED COMPLICATION (H): Primary | ICD-10-CM

## 2022-05-06 PROCEDURE — 90837 PSYTX W PT 60 MINUTES: CPT | Mod: GT | Performed by: PSYCHOLOGIST

## 2022-05-20 ENCOUNTER — VIRTUAL VISIT (OUTPATIENT)
Dept: PSYCHIATRY | Facility: CLINIC | Age: 62
End: 2022-05-20
Attending: PSYCHOLOGIST
Payer: COMMERCIAL

## 2022-05-20 DIAGNOSIS — F33.0 MILD EPISODE OF RECURRENT MAJOR DEPRESSION DURING INFANCY TO EARLY CHILDHOOD (H): Primary | ICD-10-CM

## 2022-05-20 DIAGNOSIS — F32.1 CURRENT MODERATE EPISODE OF MAJOR DEPRESSIVE DISORDER, UNSPECIFIED WHETHER RECURRENT (H): ICD-10-CM

## 2022-05-20 DIAGNOSIS — F41.1 GENERALIZED ANXIETY DISORDER: ICD-10-CM

## 2022-05-20 PROCEDURE — 90837 PSYTX W PT 60 MINUTES: CPT | Mod: GT | Performed by: PSYCHOLOGIST

## 2022-05-21 NOTE — PROGRESS NOTES
"Individual Therapy, 55 minutes  [facility]  Date:            5/6/22    Diagonosis:  Major Depression, moderate, Generalized anxiety, Alcohol dependency, in full Remission    Video-Visit Details    Type of service:  Video Visit    Video Start Time (time video started): 4:30pm    Video End Time (time video stopped): 5:30pm    Originating Location (pt. Location): Pts home    Distant Location (provider location):  Provider's home    Mode of Communication:  Video Conference via zoom    Physician has received verbal consent for a Video Visit from the patient? {Yes      Rocio Benavides, PhD LP    S/O:\"I am needing to follow-up regarding my hx of prostrate cancer. They are wondering if I have a reoccuence. He reported that Corina is really upset. He is not wanting to tell his daugher until they know details. D/kristina and her wife moved back to [place] due to her Mental health problems and having to leave medical school. kelly Noel came in and stated above .  His daughter has   had another psychotic episode and went back in the hospital. He reported that his daughter and her wife and their new baby live only a few mile away.     1. On hold regarding his wife talking so mean to him and telling him he \"does nothing \" and wanting diorce. He believes that she is trying. He did speak up a little to her.    --We outlined options previous session. Consider sooner or later: :  1. Keep the same and do nothing until more uncomfortable or things get unbearable  2.suggest couples session--with her therapist, his therapist (me) or neutral couple's counselor--to assess need for couple's counseling  3. Suggest, give a timeline to make the decision to have couple's counseling--that this is urgent for the marriage to continue. Remember that his goal is to stay  so he need to tell her and remind her that he wants to stay  and the impact of the scolding and threats of divorce is making him " "depressed and affecting his work. \"I don't know what to do, I still am not doing the right things and I don't want to get more depressed.\"   4.If all is turned down and Corina is unwilling to do couple's counseling or at least a couples' session then even though he wants the marriage he will need a trial separation to see what it will be like not being scolded. Reminding her it is the only thing he can think of if there is not an intervention as a couple. He will also need to the NOT drive her anywhere and NOT go to [place] with her to see granddaughter. He would go alone. (She also argued, put him down in front of daughter who felt the tension and commented. This is NOT ok in front of daughter who is in recovery from psychotic episode nor with granddaughter present who can feel the tension too even though she is a .    Other issues discussed:  1. Wife is still  drinking alcohol  He reports she is drinking \"less\"--she is at risk for falling.  2. So happy being a grandpa--Reji (first grandchild).born 2021.Plans to go see daughter and wife and Reji early March.-now with the broken leg don't know   3 Friendships--who are his male friends? No movement in this area.  He needs to cultivate old/new sober friends---if wife does not want to be around him or treats him poorly she needs positive people around him.  4. Work is busy and going well.       Assessment;  Bert came on time . Verbal and engaging. Bert reported that he is less depressed now--more depressed\" and helpless in making changes in his life.And now with his wife falling and breaking her leg he is overwhelmed \"doing everything.\"Perhaps this is the time for change--wife is now \"dependent\" on him for almost everything including dressing her?? His  wive's alcohol use, OCD symptomsmay need to be addressed now? Dioes she still want a a divorce and doesn't respect him.    Plan: Continue to see bi-weekly in the [facility] to work on goals.  " "Assignment--he never completed--last session : .Write out what he wants to tell his wife--outline the 4 options above regarding having couples' counseling and/or move to a planned separation. Include several statements on how his still loves her and wants to stay . If he chooses to read this to her prior to next session make sure she has NOT been drinking and not  after 7pm or \"need to talk before the second cocktail or glass of wine\" since the arguments happen after she has had greater than  He no longer purchases her liquor?? (wife does not drive). When is he going to stop smoking?    this is on hole. Deal with daughter and the move to [place].    Goals: Treatment plan:   1.  Continue to learn and practice skills to  Manage his  anxiety and depression.  2. Increase assertiveness and standing up for himself with his wife who appears to hold on to the past when he was unemployed and  Not getting any positive feedback of how he has improved is difficult--he needs   To work on Self-validation.   3. Make 2 male friends he can meet with and possibly do a hobby together.         "

## 2022-06-03 ENCOUNTER — VIRTUAL VISIT (OUTPATIENT)
Dept: PSYCHIATRY | Facility: CLINIC | Age: 62
End: 2022-06-03
Attending: PSYCHOLOGIST
Payer: COMMERCIAL

## 2022-06-03 DIAGNOSIS — F33.0 MILD EPISODE OF RECURRENT MAJOR DEPRESSION DURING INFANCY TO EARLY CHILDHOOD (H): Primary | ICD-10-CM

## 2022-06-03 DIAGNOSIS — F41.1 GENERALIZED ANXIETY DISORDER: ICD-10-CM

## 2022-06-03 PROCEDURE — 90837 PSYTX W PT 60 MINUTES: CPT | Mod: GT | Performed by: PSYCHOLOGIST

## 2022-06-13 NOTE — PROGRESS NOTES
"Individual Therapy, 55 minutes  [facility]  Date:           6/3/22    Diagonosis:  Major Depression, moderate, Generalized anxiety, Alcohol dependency, in full Remissionk    Video-Visit Details    Type of service:  Video Visit    Video Start Time (time video started): 4::05pm    Video End Time (time video stopped): 5pmpm    Originating Location (pt. Location): Pts home    Distant Location (provider location):  Provider's home    Mode of Communication:  Video Conference via zoom    Physician has received verbal consent for a Video Visit from the patient? {Yes      Rocio Benavides, PhD LP    S/O:\"I am standing up for myself more with Corina.\" \"I haven't sat her down yet to lay out how she treats me or talking at me or the need for us to do couples work--but I am going to do this.\"  Bert came in and stated above and described how a lot has been going on and he felt that this was not a good time.   Other issues discussed.     1. -We outlined options previous 2 sessions. Consider sooner or later Hw was  to create a script regarding what he can say such as \"I am 61 year old and I can say what I want.\"--he has a partial script.  .  2. Bert  agrees over 50% of the issues are the couple and [facility]. Keep the same and do nothing until more uncomfortable or things get unbearable  3.   Wife is still  drinking alcohol  He reports she is drinking \"less\"--she is at risk for falling.  4. So happy being a grandpa--Reji (first grandchild).born 12/16/2021----.they now live a few miles away.   5.  Friendships--who are his male friends? No movement in this area.  He needs to cultivate old/new sober friends---if wife does not want to be around him or treats him poorly she needs positive people around him.  6. Work is busy and going well.   7. Alcoholic neighbor a block away \"michelle\" is finishing up CD treatment--they have been taking care of her dog--wife Corina " "is accusing him of wanting to have a relationship with this woman when she is sober.      Assessment;  Bert came on time . Verbal and engaging. Bert reported that he is feeling more confident and plans to take steps to work on marriage with a couples counselor. His  wife falling and breaking her leg her and now is more mobile so he hope she is becomes less \"dependent\" on him. His  wive's alcohol use, OCD symptomsmay need to be addressed now? Dioes she still want a a divorce and doesn't respect him.    Plan: Continue to see bi-weekly in the [facility] to work on goals.  Assignment--he never completed--last 2  sessions : .Write out what he wants to tell his wife--outline the 4 options above regarding having couples' counseling and/or move to a planned separation. Include several statements on how his still loves her and wants to stay . If he chooses to read this to her prior to next session make sure she has NOT been drinking and not  after 7pm or \"need to talk before the second cocktail or glass of wine\" since the arguments happen after she has had greater than  He no longer purchases her liquor?? (wife does not drive). When is he going to stop smoking?    this is on hole. Deal with daughter and the move to [place].    Goals: Treatment plan:   1.  Continue to learn and practice skills to  Manage his  anxiety and depression.  2. Increase assertiveness and standing up for himself with his wife who appears to hold on to the past when he was unemployed and  Not getting any positive feedback of how he has improved is difficult--he needs   To work on Self-validation.   3. Make 2 male friends he can meet with and possibly do a hobby together.         "

## 2022-06-24 ENCOUNTER — VIRTUAL VISIT (OUTPATIENT)
Dept: PSYCHIATRY | Facility: CLINIC | Age: 62
End: 2022-06-24
Attending: PSYCHOLOGIST
Payer: COMMERCIAL

## 2022-06-24 DIAGNOSIS — F33.0 MILD EPISODE OF RECURRENT MAJOR DEPRESSION DURING INFANCY TO EARLY CHILDHOOD (H): Primary | ICD-10-CM

## 2022-06-24 DIAGNOSIS — F41.1 GENERALIZED ANXIETY DISORDER: ICD-10-CM

## 2022-06-24 PROCEDURE — 90837 PSYTX W PT 60 MINUTES: CPT | Mod: GT | Performed by: PSYCHOLOGIST

## 2022-07-03 NOTE — PROGRESS NOTES
"Individual Therapy, 55 minutes  [facility]  Date:           6/24//22    Diagonosis:  Major Depression, moderate, Generalized anxiety, Alcohol dependency, in full Remissionk    Video-Visit Details    Type of service:  Video Visit    Video Start Time (time video started): 2:05pm    Video End Time (time video stopped): 3pm    Originating Location (pt. Location): Pts home    Distant Location (provider location):  Provider's home    Mode of Communication:  Video Conference via zoom    Physician has received verbal consent for a Video Visit from the patient? {Yes      Rocio Benavides, PhD LP    S/O:\"You aren't going to believe this-- Corina asked to talk to me and what she brought up was that she thinks we should do couples therapy!\"  Bert came in and stated above and that he had not  sat her down yet to lay out how she treats him disrespectfully and continued to threaten divorce.  He reported that he still had  a lot has been going on and he hadn't found the right time--and this worked out well b/c it was \"her\" decision. He reported that Corina has gotten a couple of recommendations and has a zoom call with one Couples/family therapist alone. He initially thought he could sit down with her before the call and tell her not to \"talk about him\" instead we discussed how when they go for the first session he can ask the therapist what she knows already about their situation?\" Then he would not be coming off as \"trying to control the situation\" and a good therapist totally understands that it is one person's side of the story. 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 Does she want to work on marriage AND can she learn to respect him again. If not--how " "long can he tolerate someone calling him names, threatening divorce etc.    Other issues discussed.     1. Rmember and bring his \"script' to the couples session.   script regarding what he can say such as \"I am 61 year old and I can say what I want.\"--he has a partial script.  .  2. Bert  Told his wife that he agreed with couples counseling because he knows that  50% of the issues are around the couple and he needs to push for couples counseling.  3.  Couples therapy agenda/history and \"what are the issues.\"Be assertive, honest and descriptive. No fighting about what is said in couples therapy.        1)  Wife is still  drinking alcohol  He reports she is drinking \"less\"--she is at risk for falling. MUST bring up alcohol and not minimize with therapist. The therapist needs to know the facts in the first 2 sessions.                   Therapist may not ask the right question.        2) wife's suspected OCD--describe behavioral the symptoms--clean out the tub every time and NOT good enough and doesn't trust. What other anger/arguments have the roots in OCD-ulices symptoms. Then the          therapist can coordinate with the individual therapist  .    3) name calling and threatening divorce 15-20x a month and mostly when drinking. Wish they could have a relation ship like when wifes foot was broken---wife did not yell, thanked him with gratitude and allowed      him to speak up and say \"I will do later.\"       4)  HE NEEDDS some one to talk nicely and not berate because it contributes to his depression.       5) her quit drinking is continguent on his stopping smoking.       6) Corina tells me her sisters say she should get a divorce. She tells them all the \"bad\" and not how wonderful I am \"sober!!\" or is she lying to him? Holidays are not good.       7) Corina has been very judgmental and outspoken with daughter and law and fight with the mother in law of her daughter, Impact on brain healing--acceptance and let go of " "control.     4. So happy being a grandpa--Reji (first grandchild).born 12/16/2021----.they now live a few miles away.   5.  Friendships--who are his male friends? No movement in this area.  He needs to cultivate old/new sober friends---if wife does not want to be around him or treats him poorly she needs positive people around him.  6. Work is busy and going well.   7. Alcoholic neighbor a block away \"benny\" Finished  CD treatment--they have been taking care of her dog--wife Corina is accusing him of wanting to have a relationship with this woman when she is sober. Benny relapsed--what boundaries is he going to keep. How can he help and not enable or let it interfere with his life and marriage.  8. Termination or transition to couples' therapy--may eventually meet monthly and then terminate. He has made so much progress and still needs 2 male friends he can see regularly --not just AA.     Pt did not discuss any changes in his medications.    Assessment;  Bert came on time . Verbal and engaging. Bert continues to report that he is feeling more confident and now has taken  steps to get into couple's counselling--although it was Corina that initiated. Which is ok and possibly best--do not want to reinforce Patel passivity and fear of wife. How can he continue to stand up for himself. He actively was planning on avoiding topiics such as his wife's alcohol use, name calling and threats of divorce if she did not bring up. I oriented him to the importance of being OPEN, HOnest and DESCRIPTIVE with the therapist and how she needs the FACTS. . His  wife falling and breaking her leg allwoed him to see that she can treat him nice and not call names but now that she  is more mobile and  less \"dependent\" on him so he can go back to work--she is back to calling him names.  His  wive's alcohol use, OCD symptomsmay need to be addressed now?     Plan: Continue to see bi-weekly in the [facility] to work on goals.  Assignment " "change since --he never completed--last3  sessions : .Write out what he wants to tell his wife and couples' therapist regarding what the issues are in their marriage and relationship--see above outline.  Include several statements on how his still loves her and wants to stay . If he chooses to read this to her prior to next session make sure she has NOT been drinking and not  after 7pm or \"need to talk before the second cocktail or glass of wine\" since the arguments happen after she has had greater than  He no longer purchases her liquor?? (wife does not drive). When is he going to stop smoking?        Goals: Treatment plan:   1.  Continue to learn and practice skills to  Manage his  anxiety and depression.  2. Increase assertiveness and standing up for himself with his wife who appears to hold on to the past when he was unemployed and  Not getting any positive feedback of how he has improved is difficult--he needs   To work on Self-validation.   3. Make 2 male friends he can meet with and possibly do a hobby together.         "

## 2022-07-07 ENCOUNTER — VIRTUAL VISIT (OUTPATIENT)
Dept: PSYCHIATRY | Facility: CLINIC | Age: 62
End: 2022-07-07
Attending: PSYCHOLOGIST
Payer: COMMERCIAL

## 2022-07-07 DIAGNOSIS — F33.0 MILD EPISODE OF RECURRENT MAJOR DEPRESSION DURING INFANCY TO EARLY CHILDHOOD (H): Primary | ICD-10-CM

## 2022-07-07 DIAGNOSIS — F32.1 CURRENT MODERATE EPISODE OF MAJOR DEPRESSIVE DISORDER, UNSPECIFIED WHETHER RECURRENT (H): ICD-10-CM

## 2022-07-07 DIAGNOSIS — F41.1 GENERALIZED ANXIETY DISORDER: ICD-10-CM

## 2022-07-07 PROCEDURE — 90837 PSYTX W PT 60 MINUTES: CPT | Mod: GT | Performed by: PSYCHOLOGIST

## 2022-07-14 NOTE — PROGRESS NOTES
"Individual Therapy, 55 minutes  [facility]  Date:         7/7//22    Diagonosis:  Major Depression, moderate, Generalized anxiety, Alcohol dependency, in full Remissionk    Video-Visit Details    Type of service:  Video Visit    Video Start Time (time video started): 9:05am    Video End Time (time video stopped): 10am    Originating Location (pt. Location): Pts home    Distant Location (provider location):  Provider's home    Mode of Communication:  Video Conference via zoom    Physician has received verbal consent for a Video Visit from the patient? {Yes      Rocio Benavides, PhD LP    S/O:\"I don't know if I can bring up her drinking or her OCD. She has to do this for herself.\"   Bert came in and stated above and described how difficult it is to come up with the words and not wanting to \"upset \" his wife. Therapist labeled his passive and \"aviodant behaviors\" and how it is time for him to step up and operate with more confidence and knowlege They are scheduled to start marital In a week. He reported that his wife has talked to this therapist twice. Pro/con using couples therapy as a place to work things through.would it be good for him.       One main issue is around the \"time of couples therapy\"--Corina chose mondays at 5pm which is when he gets ready for AA meeting.    He is still raising the iss. Does she want to work on marriage AND can she learn to respect him again. If not--how long can he tolerate someone calling him names, threatening divorce etc.    Other issues discussed.     1. Rmember and bring his \"script' to the couples session.   script regarding what he can say such as \"I am 61 year old and I can say what I want.\"--he has a partial script.  .  2. Bert  Told his wife that he agreed with couples counseling because he knows that  50% of the issues are around the couple and he needs to push for couples counseling.  3.  Couples therapy agenda/history and \"what " "are the issues.\"Be assertive, honest and descriptive. No fighting about what is said in couples therapy.        1)  Wife is still  drinking alcohol  He reports she is drinking \"less\"--she is at risk for falling. MUST bring up alcohol and not minimize with therapist. The therapist needs to know the facts in the first 2 sessions.                   Therapist may not ask the right question.        2) wife's suspected OCD--describe behavioral the symptoms--clean out the tub every time and NOT good enough and doesn't trust. What other anger/arguments have the roots in OCD-ulices symptoms. Then the          therapist can coordinate with the individual therapist  .    3) name calling and threatening divorce 15-20x a month and mostly when drinking. Wish they could have a relation ship like when wifes foot was broken---wife did not yell, thanked him with gratitude and allowed      him to speak up and say \"I will do later.\"       4)  HE NEEDDS some one to talk nicely and not berate because it contributes to his depression.       5) her quit drinking is continguent on his stopping smoking.       6) Corina tells me her sisters say she should get a divorce. She tells them all the \"bad\" and not how wonderful I am \"sober!!\" or is she lying to him? Holidays are not good.       7) Corina has been very judgmental and outspoken with daughter and law and fight with the mother in law of her daughter, Impact on brain healing--acceptance and let go of control.     4. So happy being a grandpa--Reji (first grandchild).born 12/16/2021----.they now live a few miles away.   5.  Friendships--who are his male friends? No movement in this area.  He needs to cultivate old/new sober friends---if wife does not want to be around him or treats him poorly she needs positive people around him.  6. Work is busy and going well.   7. Alcoholic neighbor a block away \"michelle\" Finished  CD treatment--they have been taking care of her dog--wife Corina is " "accusing him of wanting to have a relationship with this woman when she is sober. Benny relapsed--what boundaries is he going to keep. How can he help and not enable or let it interfere with his life and marriage.  8. Termination or transition to couples' therapy--may eventually meet monthly and then terminate. He has made so much progress and still needs 2 male friends he can see regularly --not just AA.     Pt did not discuss any changes in his medications.    Assessment;  Bert came on time . Verbal and engaging. Bert continues to report that he is feeling more confident and now has taken  steps to get into couple's counselling--although it was Corina that initiated. Which is ok and possibly best--do not want to reinforce Patel passivity and fear of wife. How can he continue to stand up for himself. He actively was planning on avoiding topiics such as his wife's alcohol use, name calling and threats of divorce if she did not bring up. I oriented him to the importance of being OPEN, HOnest and DESCRIPTIVE with the therapist and how she needs the FACTS. . His  wife falling and breaking her leg allwoed him to see that she can treat him nice and not call names but now that she  is more mobile and  less \"dependent\" on him so he can go back to work--she is back to calling him names.  His  wive's alcohol use, OCD symptomsmay need to be addressed now?     Plan: Continue to see bi-weekly in the [facility] to work on goals.  Assignment change since --he never completed--last3  sessions : .Write out what he wants to tell his wife and couples' therapist regarding what the issues are in their marriage and relationship--see above outline.  Include several statements on how his still loves her and wants to stay . If he chooses to read this to her prior to next session make sure she has NOT been drinking and not  after 7pm or \"need to talk before the second cocktail or glass of wine\" since the arguments happen after she " has had greater than  He no longer purchases her liquor?? (wife does not drive). When is he going to stop smoking?        Goals: Treatment plan:   1.  Continue to learn and practice skills to  Manage his  anxiety and depression.  2. Increase assertiveness and standing up for himself with his wife who appears to hold on to the past when he was unemployed and  Not getting any positive feedback of how he has improved is difficult--he needs   To work on Self-validation.   3. Make 2 male friends he can meet with and possibly do a hobby together.

## 2022-07-22 ENCOUNTER — VIRTUAL VISIT (OUTPATIENT)
Dept: PSYCHIATRY | Facility: CLINIC | Age: 62
End: 2022-07-22
Attending: PSYCHOLOGIST
Payer: COMMERCIAL

## 2022-07-22 DIAGNOSIS — F33.0 MILD EPISODE OF RECURRENT MAJOR DEPRESSION DURING INFANCY TO EARLY CHILDHOOD (H): Primary | ICD-10-CM

## 2022-07-22 DIAGNOSIS — F41.1 GENERALIZED ANXIETY DISORDER: ICD-10-CM

## 2022-07-22 PROCEDURE — 90837 PSYTX W PT 60 MINUTES: CPT | Mod: GT | Performed by: PSYCHOLOGIST

## 2022-07-25 NOTE — ED TRIAGE NOTES
Medicare Annual Wellness Visit    Colleen Eaton is here for Medicare AWV   Recommendations for Preventive Services Due: see orders and patient instructions/AVS.  Recommended screening schedule for the next 5-10 years is provided to the patient in written form: see Patient Instructions/AVS.     Return for Medicare Annual Wellness Visit in 1 year. Patient's complete Health Risk Assessment and screening values have been reviewed and are found in Flowsheets. The following problems were reviewed today and where indicated follow up appointments were made and/or referrals ordered. Positive Risk Factor Screenings with Interventions:     Cognitive: Words recalled: 0 Words Recalled  Clock Drawing Test (CDT): (!) Abnormal  Total Score Interpretation: Abnormal Mini-Cog  Did the patient refuse to take the cognition test?: No (Pt has alzheimer's disease)  Cognitive Impairment Interventions:severe cognitive decline/ dementia         General Health and ACP:  General  In general, how would you say your health is?: Good  In the past 7 days, have you experienced any of the following: New or Increased Pain, New or Increased Fatigue, Loneliness, Social Isolation, Stress or Anger?: No  Do you get the social and emotional support that you need?: Yes  Do you have a Living Will?: Yes    Advance Directives       Power of  Living Will ACP-Advance Directive ACP-Power of     Not on File Not on File Not on File Filed          General Health Risk Interventions:  Health Habits/Nutrition Interventions: Patient needs 24/7 caretaker help with dressing meals and daily living              Objective   Vitals:    07/25/22 0813   BP: 118/80   Site: Left Upper Arm   Position: Sitting   Cuff Size: Small Adult   Resp: 18   Weight: 99 lb (44.9 kg)   Height: 5' 4\" (1.626 m)      Body mass index is 16.99 kg/m². Allergies   Allergen Reactions    Penicillins      Prior to Visit Medications    Medication Sig Taking?  Authorizing Pt here for detox from alcohol.  Pt last drink was just PTA.  Pt states no hx of seizures coming off alcohol.  Pt states he called ahead and was told there was no bed but he came in anyways because he didn't want to wait at home.   Provider   esomeprazole Magnesium (NEXIUM) 20 MG PACK Take 20 mg by mouth daily Yes Historical Provider, MD   Cholecalciferol (VITAMIN D3) 5000 units TABS Take 5,000 Units by mouth daily Yes Historical Provider, MD   Calcium 600-200 MG-UNIT TABS Take 1 tablet by mouth daily Yes Historical Provider, MD   Misc Natural Products (OSTEO BI-FLEX ADV JOINT SHIELD) TABS Take 1 tablet by mouth 2 times daily Yes Historical Provider, MD       CareTeam (Including outside providers/suppliers regularly involved in providing care):   Patient Care Team:  Jacquelyn Casillas MD as PCP - General (Internal Medicine)  Jacquelyn Casillas MD as PCP - [facility] Empaneled Provider  Jose G Oglesby MD as Consulting Physician (Neurosurgery)     Reviewed and updated this visit:  Tobacco  Allergies  Meds  Med Hx  Surg Hx  Soc Hx  Fam Hx        MEDICARE WELLNESS SCREENING/ MAINTENANCE:  1:MAMMOGRAM na  PAP na  BONE DENSITY na  2. SCREENING CSCOPE na  3. CARDIOVASCULAR SCREENING- LIPID PANEL DONE  4. DIABETES SCREEN DONE - FBS =ABOVE LABS  5. PNEUMONIA VACCINATION- completed series  6. INFLUENZA VACCINATION: TO BE DONE IN FALL  7. HEP B VACCINATION- PT DECLINES  8. HIV/ HEP SCREENING na       Patient Instructions   Personalized Preventive Plan for [place] Screen - 7/25/2022  Medicare offers a range of preventive health benefits. Some of the tests and screenings are paid in full while other may be subject to a deductible, co-insurance, and/or copay. Some of these benefits include a comprehensive review of your medical history including lifestyle, illnesses that may run in your family, and various assessments and screenings as appropriate. After reviewing your medical record and screening and assessments performed today your provider may have ordered immunizations, labs, imaging, and/or referrals for you. A list of these orders (if applicable) as well as your Preventive Care list are included within your After Visit Summary for your review.     Other
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
## 2022-08-05 ENCOUNTER — VIRTUAL VISIT (OUTPATIENT)
Dept: PSYCHIATRY | Facility: CLINIC | Age: 62
End: 2022-08-05
Attending: PSYCHOLOGIST
Payer: COMMERCIAL

## 2022-08-05 DIAGNOSIS — F41.1 GENERALIZED ANXIETY DISORDER: ICD-10-CM

## 2022-08-05 DIAGNOSIS — F33.0 MILD EPISODE OF RECURRENT MAJOR DEPRESSION DURING INFANCY TO EARLY CHILDHOOD (H): Primary | ICD-10-CM

## 2022-08-05 PROCEDURE — 90837 PSYTX W PT 60 MINUTES: CPT | Mod: GT | Performed by: PSYCHOLOGIST

## 2022-09-02 ENCOUNTER — VIRTUAL VISIT (OUTPATIENT)
Dept: PSYCHIATRY | Facility: CLINIC | Age: 62
End: 2022-09-02
Attending: PSYCHOLOGIST
Payer: COMMERCIAL

## 2022-09-02 DIAGNOSIS — F33.0 MILD EPISODE OF RECURRENT MAJOR DEPRESSION DURING INFANCY TO EARLY CHILDHOOD (H): Primary | ICD-10-CM

## 2022-09-02 DIAGNOSIS — F10.939 ALCOHOL WITHDRAWAL WITH COMPLICATION WITH INPATIENT TREATMENT, WITH UNSPECIFIED COMPLICATION (H): ICD-10-CM

## 2022-09-02 DIAGNOSIS — F41.1 GENERALIZED ANXIETY DISORDER: ICD-10-CM

## 2022-09-02 PROCEDURE — 90837 PSYTX W PT 60 MINUTES: CPT | Mod: GT | Performed by: PSYCHOLOGIST
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
## 2022-09-16 ENCOUNTER — VIRTUAL VISIT (OUTPATIENT)
Dept: PSYCHIATRY | Facility: CLINIC | Age: 62
End: 2022-09-16
Attending: PSYCHOLOGIST
Payer: COMMERCIAL

## 2022-09-16 DIAGNOSIS — F33.0 MILD EPISODE OF RECURRENT MAJOR DEPRESSION DURING INFANCY TO EARLY CHILDHOOD (H): Primary | ICD-10-CM

## 2022-09-16 DIAGNOSIS — F41.1 GENERALIZED ANXIETY DISORDER: ICD-10-CM

## 2022-09-16 PROCEDURE — 90837 PSYTX W PT 60 MINUTES: CPT | Mod: GT | Performed by: PSYCHOLOGIST
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
## 2022-10-07 ENCOUNTER — VIRTUAL VISIT (OUTPATIENT)
Dept: PSYCHIATRY | Facility: CLINIC | Age: 62
End: 2022-10-07
Attending: PSYCHOLOGIST
Payer: COMMERCIAL

## 2022-10-07 DIAGNOSIS — F41.1 GENERALIZED ANXIETY DISORDER: ICD-10-CM

## 2022-10-07 DIAGNOSIS — F33.0 MILD EPISODE OF RECURRENT MAJOR DEPRESSION DURING INFANCY TO EARLY CHILDHOOD (H): Primary | ICD-10-CM

## 2022-10-07 PROCEDURE — 90837 PSYTX W PT 60 MINUTES: CPT | Mod: GT | Performed by: PSYCHOLOGIST

## 2022-10-15 ENCOUNTER — HEALTH MAINTENANCE LETTER (OUTPATIENT)
Age: 62
End: 2022-10-15
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
## 2022-10-28 ENCOUNTER — OFFICE VISIT (OUTPATIENT)
Dept: PSYCHIATRY | Facility: CLINIC | Age: 62
End: 2022-10-28
Attending: PSYCHOLOGIST
Payer: COMMERCIAL

## 2022-10-28 DIAGNOSIS — F41.1 GENERALIZED ANXIETY DISORDER: ICD-10-CM

## 2022-10-28 DIAGNOSIS — F33.0 MILD EPISODE OF RECURRENT MAJOR DEPRESSION DURING INFANCY TO EARLY CHILDHOOD (H): Primary | ICD-10-CM

## 2022-10-28 PROCEDURE — 90837 PSYTX W PT 60 MINUTES: CPT | Performed by: PSYCHOLOGIST
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
## 2022-11-11 ENCOUNTER — VIRTUAL VISIT (OUTPATIENT)
Dept: PSYCHIATRY | Facility: CLINIC | Age: 62
End: 2022-11-11
Attending: PSYCHOLOGIST
Payer: COMMERCIAL

## 2022-11-11 DIAGNOSIS — F33.0 MILD EPISODE OF RECURRENT MAJOR DEPRESSION DURING INFANCY TO EARLY CHILDHOOD (H): Primary | ICD-10-CM

## 2022-11-11 DIAGNOSIS — F41.1 GENERALIZED ANXIETY DISORDER: ICD-10-CM

## 2022-11-11 PROCEDURE — 90837 PSYTX W PT 60 MINUTES: CPT | Mod: GT | Performed by: PSYCHOLOGIST
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
## 2022-12-09 ENCOUNTER — VIRTUAL VISIT (OUTPATIENT)
Dept: PSYCHIATRY | Facility: CLINIC | Age: 62
End: 2022-12-09
Attending: PSYCHOLOGIST
Payer: COMMERCIAL

## 2022-12-09 DIAGNOSIS — F41.1 GENERALIZED ANXIETY DISORDER: ICD-10-CM

## 2022-12-09 DIAGNOSIS — F33.0 MILD EPISODE OF RECURRENT MAJOR DEPRESSION DURING INFANCY TO EARLY CHILDHOOD (H): Primary | ICD-10-CM

## 2022-12-09 PROCEDURE — 90837 PSYTX W PT 60 MINUTES: CPT | Mod: GT | Performed by: PSYCHOLOGIST
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
## 2023-01-13 ENCOUNTER — VIRTUAL VISIT (OUTPATIENT)
Dept: PSYCHIATRY | Facility: CLINIC | Age: 63
End: 2023-01-13
Attending: NURSE PRACTITIONER
Payer: COMMERCIAL

## 2023-01-13 DIAGNOSIS — F41.1 GENERALIZED ANXIETY DISORDER: ICD-10-CM

## 2023-01-13 DIAGNOSIS — F33.0 MILD EPISODE OF RECURRENT MAJOR DEPRESSION DURING INFANCY TO EARLY CHILDHOOD (H): Primary | ICD-10-CM

## 2023-01-13 PROCEDURE — 90837 PSYTX W PT 60 MINUTES: CPT | Mod: GT | Performed by: PSYCHOLOGIST
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
## 2023-01-27 ENCOUNTER — VIRTUAL VISIT (OUTPATIENT)
Dept: PSYCHIATRY | Facility: CLINIC | Age: 63
End: 2023-01-27
Attending: PSYCHOLOGIST
Payer: COMMERCIAL

## 2023-01-27 DIAGNOSIS — F41.1 GENERALIZED ANXIETY DISORDER: ICD-10-CM

## 2023-01-27 DIAGNOSIS — F33.0 MILD EPISODE OF RECURRENT MAJOR DEPRESSION DURING INFANCY TO EARLY CHILDHOOD (H): Primary | ICD-10-CM

## 2023-01-27 PROCEDURE — 90837 PSYTX W PT 60 MINUTES: CPT | Mod: GT | Performed by: PSYCHOLOGIST
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
## 2023-02-10 ENCOUNTER — VIRTUAL VISIT (OUTPATIENT)
Dept: PSYCHIATRY | Facility: CLINIC | Age: 63
End: 2023-02-10
Attending: PSYCHOLOGIST
Payer: COMMERCIAL

## 2023-02-10 DIAGNOSIS — F41.1 GENERALIZED ANXIETY DISORDER: ICD-10-CM

## 2023-02-10 DIAGNOSIS — F33.0 MILD EPISODE OF RECURRENT MAJOR DEPRESSION DURING INFANCY TO EARLY CHILDHOOD (H): Primary | ICD-10-CM

## 2023-02-10 PROCEDURE — 90837 PSYTX W PT 60 MINUTES: CPT | Mod: VID | Performed by: PSYCHOLOGIST
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
## 2023-03-10 ENCOUNTER — VIRTUAL VISIT (OUTPATIENT)
Dept: PSYCHIATRY | Facility: CLINIC | Age: 63
End: 2023-03-10
Attending: PSYCHOLOGIST
Payer: COMMERCIAL

## 2023-03-10 DIAGNOSIS — F41.1 GENERALIZED ANXIETY DISORDER: ICD-10-CM

## 2023-03-10 DIAGNOSIS — F33.0 MILD EPISODE OF RECURRENT MAJOR DEPRESSION DURING INFANCY TO EARLY CHILDHOOD (H): Primary | ICD-10-CM

## 2023-03-10 PROCEDURE — 90837 PSYTX W PT 60 MINUTES: CPT | Mod: VID | Performed by: PSYCHOLOGIST
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
## 2023-04-07 ENCOUNTER — VIRTUAL VISIT (OUTPATIENT)
Dept: PSYCHIATRY | Facility: CLINIC | Age: 63
End: 2023-04-07
Attending: PSYCHOLOGIST
Payer: COMMERCIAL

## 2023-04-07 DIAGNOSIS — F33.0 MILD EPISODE OF RECURRENT MAJOR DEPRESSION DURING INFANCY TO EARLY CHILDHOOD (H): Primary | ICD-10-CM

## 2023-04-07 DIAGNOSIS — F41.1 GENERALIZED ANXIETY DISORDER: ICD-10-CM

## 2023-04-07 DIAGNOSIS — F41.9 ANXIETY DISORDER, UNSPECIFIED TYPE: ICD-10-CM

## 2023-04-07 PROCEDURE — 90837 PSYTX W PT 60 MINUTES: CPT | Mod: VID | Performed by: PSYCHOLOGIST
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
## 2023-05-12 ENCOUNTER — VIRTUAL VISIT (OUTPATIENT)
Dept: PSYCHIATRY | Facility: CLINIC | Age: 63
End: 2023-05-12
Attending: PSYCHOLOGIST
Payer: COMMERCIAL

## 2023-05-12 DIAGNOSIS — F41.1 GENERALIZED ANXIETY DISORDER: ICD-10-CM

## 2023-05-12 DIAGNOSIS — F33.0 MILD EPISODE OF RECURRENT MAJOR DEPRESSION DURING INFANCY TO EARLY CHILDHOOD (H): Primary | ICD-10-CM

## 2023-05-12 DIAGNOSIS — F41.9 ANXIETY DISORDER, UNSPECIFIED TYPE: ICD-10-CM

## 2023-05-12 PROCEDURE — 90837 PSYTX W PT 60 MINUTES: CPT | Mod: VID | Performed by: PSYCHOLOGIST

## 2023-05-22 NOTE — PROGRESS NOTES
"Individual Therapy, 55 minutes  [facility]  Date: 23    Diagonosis:  Major Depression, moderate, Generalized anxiety, Alcohol dependency, in full Remissionk    Video-Visit Details    Type of service:  Video Visit    Video Start Time (time video started):4:00pm     Video End Time (time video stopped) 5:00pm    Originating Location (pt. Location): [facility]/workplace      Distant Location (provider location):  Off-site    Mode of Communication:  Video Conference via [facility]    Physician has received verbal consent for a Video Visit from the patient? Yes      Rocio Benavides, PhD LP          S/O:\"Radha decided that she did not want to work with our couple's  therapis,t Demmi, any longer and I ended up agreeing with her. We need someone who is more hands on and give us ideas what to do.\"   I never  Did  ask to meet with her alone.\" \"I know that I am guarded and conflict avoidant--real progress has not been made.\" Discussed again that  that after now 8 months months of couples therapy he  hasn't mentioned any more to therapist about his wife's quantity of alcohol use. ?Bert acknowledged his continued  \"avoidane.\"        Other issues discussed.  1.   . Long hx of \"being a people pleaser. And avoidant. He did stand up to Corina recently and was very proud of himself.     2.  Termination as  transition to couples' therapy--may eventually meet monthly and then terminate. He has made so much progress and still needs 2 male friends he can see regularly --not just AA.   3. Bert said that he has initiated doing Step work in AA. He needs to do the 5th step--he holds resentments against  Corina-  5. Corina's mother drank and Not close to her. Dad was a doctor--Corina's sister completed  suicide at age of 50 after their mother .--she was just a year younger than Corina. This sister went to a convent and then had an  which she reportedly \"never got over.\"  6.  " "Daughter Radha had psychotic episode and last year before baby born so dropped out of medical school and completed  her MA in public health. She is still floundering a bit reportedly but moved to a new [facility] with a yard for the baby.Still concerned that his wife Corina still keeps \"Blaming Cristo.-for Radha's psychotic breakdown.    7. Mother bipolar--depressive. Younger brother--manic Oldest brother both manic and depressive.  .   .    Pt did not discuss any changes in his medications.    Assessment;  Bert came on time-. Verbal and engaging. He reported they discharge couples therapy but do wanted recommendations for a new one. Followed up  regarding his fears and the possiblity that a legal separation or just a separation need in order for wife to see how he has made changes, can stand up for himself and that she is an alcoholic. Somehow need to change the patterns or he will continue to feel depress and sad and not want to retire or go home? Sponsor is  due to wife continued to drink when he became sober--does he need a second sponsor.    Plan: Continue to see bi-weekly to work on goals.Courage and manage fears of wife during couple's counseling. Find a new couples therapist.  Continued need to work on 2 friendships and talking to sponsor about wife's drinking and/or get a second sponsor.       Goals: Treatment plan:      Treatment Plan          SYMPTOMS; PROBLEMS   MEASURABLE GOALS;    FUNCTIONAL IMPROVEMENT INTERVENTIONS;   GAINS MADE DISCHARGE CRITERIA   Anxiety and fears dealing with his wife. Passivity and then anger and frustrations.  Tired of verbal abuse of wife and her telling him she wants a divorce   1. Continue to learn and practice praful to manage his anxiety and depression.'  A. Increase assertivenss and standing up for himself  B. Work on self -validation.  C. Continue sobriety and being  Active in AA.  D. Couples therapy - (2/10/23)be more honest, open and descriptive . The counselor " "needs the facts on alcohol use, wife's dependency on him yet ongoing threats of divorce. Do they need a legal separation in order for wife to know she loves him and wants to work on the marriage?  Progress noted in not being reactive to wife. INcrease confidence and assertiveness. Rates self 7 (10 hi) on assertiveness. Started couples therapy Nov. 6-8 session.  Replace individual therapy with couples therapy and 2 male friends.    Social isolation in terms of outside of AA. No close male friends he can call and do something with socially outside of AA.    2. Make 2 friends whom he can spend time with outside of work and/or AA.  A. Reach out and connect on a deeper level with male friends.  B. Stand up for self more around \"couples friends\" when wife around.  C. Reconnect with friends in [place]. 6-8 core friends. Song for each of them.    Speaking up with wife when with couples. The next step is finding a hobby or activity to do with males.Bert from old job & _____.  If wife chooses not to work on the marriage--he needs a support system outside of work.  Progress noted in terms of identifying a few males.  He does have brothers he has connected more and they have sobriety In common.      Date of most recent treatment plan:  2/10/2023  Date next treatment plan due: 5/10/2023     Psychotherapy services during this visit included myself and the patient. Patient agreed with treatment plan on 2/10/2023 .  Unable to sign in person due to visit being conducted through telehealth due to COVID-19.   "

## 2023-06-09 ENCOUNTER — VIRTUAL VISIT (OUTPATIENT)
Dept: PSYCHIATRY | Facility: CLINIC | Age: 63
End: 2023-06-09
Attending: PSYCHOLOGIST
Payer: COMMERCIAL

## 2023-06-09 DIAGNOSIS — F33.0 MILD EPISODE OF RECURRENT MAJOR DEPRESSION DURING INFANCY TO EARLY CHILDHOOD (H): Primary | ICD-10-CM

## 2023-06-09 DIAGNOSIS — F41.1 GENERALIZED ANXIETY DISORDER: ICD-10-CM

## 2023-06-09 PROCEDURE — 90837 PSYTX W PT 60 MINUTES: CPT | Mod: VID | Performed by: PSYCHOLOGIST

## 2023-06-19 NOTE — PROGRESS NOTES
"Individual Therapy, 55 minutes  [facility]  Date: 6/09/23    Diagonosis:  Major Depression, moderate, Generalized anxiety, Alcohol dependency, in full Remissionk    Video-Visit Details    Type of service:  Video Visit    Video Start Time (time video started):2:05:00pm     Video End Time (time video stopped )3:00pm    Originating Location (pt. Location): [facility]workplace      Distant Location (provider location):  Off-site    Mode of Communication:  Video Conference via [facility]    Physician has received verbal consent for a Video Visit from the patient? Yes      Rocio Benavides, PhD LP          S/O:\"Corina gave me another ultimatum--the same stuff we have been talking about. She was probably intoxicated again.\" \"We were out to dinner with that couple and the  made comments about Jaime talking over me and for me.\"  Bert came in and stated above and talked about how he is \"getting tired\" of how he has been treated by his wife and the ongoing threats to leave him. He talked again how he \"avoids conflict' but that doesn't do any good. He reported that his wife will probably not want to go back to couple's counseling since they fired their marriage counselor.We talked about how the marriage counselor never did have all the facts--Bert never  I never asked  to meet with her alone.\" \"I know that I am guarded and conflict avoidant--real progress has not been made.\" Discussed again that  that after now 9 months months of couples therapy he  hasn't mentioned any more to therapist about his wife's quantity of alcohol use. ?Bert acknowledged his continued  \"avoidance.\"        Other issues discussed.  1.   . Long hx of \"being a people pleaser. And avoidant. He did stand up to Croina 2 weeks ago and now is gearing up to be more specific with her.     2.  Termination issues--until he utilizes the assertiiveness and interrupts avoidance--we may eventually meet monthly and then " "terminate. He made so much progress and now in a holding pattern with marriage. The focus needs to now be on making 2 male friends he can see regularly --not just AA.   3.Last session  Bert said that he had initiated doing Step work in AA.He is meeting on Tuesday with his colleague Christopher who had to fire him back when they found out he was not licensed.  He needs to do the 5th step--he holds resentments against  Corina-  4.Last session:  Corina's mother drank and Not close to her. Dad was a doctor--Corina's sister completed  suicide at age of 50 after their mother .--she was just a year younger than Corina. This sister went to a convent and then had an  which she reportedly \"never got over.\"  5. Wife did not go to the Mother's Day gathering at their daughters because she was mad at [place]. Bert went anyways. His wife had started drinking in the morning before the BBQ.   6 last session talked about how his mother was Mother bipolar--depressive. Younger brother--manic Oldest brother both manic and depressive.  7. Meeting with sponsor re: marriage and how to handle wife's drinking and ongoing threats for a divorce. . Is he too tolerant--quality of life. Bert reported \"no leora at home\" only with daughter and granddaughter.  8. Reviewed goals and treatment plan.    .   .    Pt did not discuss any changes in his medications.    Assessment;  Bert came on time-. Verbal and engaging. He reported that his wife does not want to go back to jacob marriage counselor. She threatened Leaving [facility] beginning of July \"if nothing changes. He doesn't know what he has to change. Followed up  regarding his fears and the possiblity that a legal separation or just a separation need in order for wife to see how he has made changes, can stand up for himself and that she is an alcoholic. Somehow need to change the patterns or he will continue to feel depression  and sad and not want to retire and doesn't like to  go home? Sponsor " "is  due to wife continued to drink when he became sober--does he need a second sponsor.    Plan: Continue to see bi-weekly to work on goals.Courage and manage fears of wife during couple's counseling. Find a new couples therapist-Therapist recommends : Gal Serrato-- Continued need to work on 2 friendships and talking to sponsor about wife's drinking and/or get a second sponsor.       Goals: Treatment plan:      Treatment Plan          SYMPTOMS; PROBLEMS   MEASURABLE GOALS;    FUNCTIONAL IMPROVEMENT INTERVENTIONS;   GAINS MADE DISCHARGE CRITERIA   Anxiety and fears dealing with his wife. Passivity and then anger and frustrations.  Tired of verbal abuse of wife and her telling him she wants a divorce   1. Continue to learn and practice praful to manage his anxiety and depression.'  A. Increase assertivenss and standing up for himself  B. Work on self -validation.  C. Continue sobriety and being  Active in AA.  D. Couples therapy - (2/10/23)be more honest, open and descriptive . The counselor needs the facts on alcohol use, wife's dependency on him yet ongoing threats of divorce. Do they need a legal separation in order for wife to know she loves him and wants to work on the marriage?  Progress noted in not being reactive to wife. INcrease confidence and assertiveness. Rates self 7 (10 hi) on assertiveness. Started couples therapy Nov. 6-8 session.  Replace individual therapy with couples therapy and 2 male friends.    Social isolation in terms of outside of AA. No close male friends he can call and do something with socially outside of AA.    2. Make 2 friends whom he can spend time with outside of work and/or AA.  A. Reach out and connect on a deeper level with male friends.  B. Stand up for self more around \"couples friends\" when wife around.  C. Reconnect with friends in [place]. 6-8 core friends. Song for each of them.    Speaking up with wife when with couples. The next step is finding a hobby or " activity to do with males.Bert from old job & _____.  If wife chooses not to work on the marriage--he needs a support system outside of work.  Progress noted in terms of identifying a few males.  He does have brothers he has connected more and they have sobriety In common.      Date of most recent treatment plan: 6/09/2023  Date next treatment plan due: 9/09/2023     Psychotherapy services during this visit included myself and the patient. Patient agreed with treatment plan on 6/09/2023 .  Unable to sign in person due to visit being conducted through telehealth due to COVID-19.

## 2023-06-23 ENCOUNTER — VIRTUAL VISIT (OUTPATIENT)
Dept: PSYCHIATRY | Facility: CLINIC | Age: 63
End: 2023-06-23
Attending: PSYCHOLOGIST
Payer: COMMERCIAL

## 2023-06-23 DIAGNOSIS — F33.0 MILD EPISODE OF RECURRENT MAJOR DEPRESSION DURING INFANCY TO EARLY CHILDHOOD (H): Primary | ICD-10-CM

## 2023-06-23 DIAGNOSIS — F41.1 GENERALIZED ANXIETY DISORDER: ICD-10-CM

## 2023-06-23 PROCEDURE — 90837 PSYTX W PT 60 MINUTES: CPT | Mod: VID | Performed by: PSYCHOLOGIST

## 2023-06-23 NOTE — PROGRESS NOTES
"Individual Therapy, 55 minutes  [facility]  Date: 6/23/23    Diagonosis:  Major Depression, moderate, Generalized anxiety, Alcohol dependency, in full Remissionk    Video-Visit Details    Type of service:  Video Visit    Video Start Time (time video started):3:10pm     Video End Time (time video stopped )4:15pm    Originating Location (pt. Location): [facility]/workplace      Distant Location (provider location):  work/onsite    Mode of Communication:  Video Conference via [employer]    Physician has received verbal consent for a Video Visit from the patient? Yes      Rocio Benavides, PhD LP          S/O:\"Corina seems like she is drinking less. She has been better.\" Bert came in and stated above and reported that  The   ultimatum- is up in July (\"if I don't change) but she hasn't said anything lately.Bert reported that he is working on developing male relationships--Goal to have 2 friends he can share with regularly 1) Bert from his old work that lives close; 2) Friend in [place]--\"I sent a father's Day greetings and picuurtes of Lisa--I had not told him that Deirdre is Black       Other issues discussed.  1.   . Long hx of \"being a people pleaser. And avoidant. He reported that he has been standing up for himself more at work .     2.  Termination issues--until he utilizes the assertiiveness and interrupts avoidance--we may eventually meet monthly and then terminate. He made so much progress and now in a holding pattern with marriage. The focus needs to now be on making 2 male friends he can see regularly --not just AA.   3.Bert has activated around his \"Step work in AA.\"  He met last Tuesday with his colleague Christopher who had to fire him back when they found out he was not licensed.  It went very well. He is now going to schedule 2 more. And eventually  resentments against  Corina-  4 previous session information:    Corina's mother drank and Not close to her. Dad was a " "doctor--Corina's sister completed  suicide at age of 50 after their mother .--she was just a year younger than Corina. This sister went to a convent and then had an  which she reportedly \"never got over.\"  5. Father's Day--his daughter, trell asked if he wanted to go to the dog park --just the 2 of them.  (Wife Corina  did not go to the Mother's Day gathering at their daughters because she was mad at Bert. Bert went anyways. His wife had started drinking in the morning before the BBQ.   6 last session talked about how his mother was Mother bipolar--depressive. Younger brother--manic Oldest brother both manic and depressive.  7. previous session:  Meeting with sponsor re: marriage and how to handle wife's drinking and ongoing threats for a divorce. . Is he too tolerant--quality of life. Bert reported \"no leora at home\" only with daughter and granddaughter.  8. Reviewed goals and treatment plan.    .   .    Pt did not discuss any changes in his medications.    Assessment;  Bert came on time-. Verbal and engaging. They are no longer in  marriage counseling. . Bert believes that his wife iw He doesn't know what he has to change. Followed up  regarding his fears and the possiblity that a legal separation or just a separation need in order for wife to see how he has made changes, can stand up for himself and that she is an alcoholic. Somehow need to change the patterns or he will continue to feel depression  and sad and not want to retire and doesn't like to  go home? Steph is  due to wife continued to drink when he became sober--does he need a second sponsor.    Plan: Continue to see bi-weekly to work on goals. Find a new couples therapist-Therapist recommends : Gal Serrato-- Continued need to work on 2 friendships and talking to sponsor about wife's drinking and/or get a second sponsor. He is doing the 5th step with Steph (Edilson?)     Goals: Treatment plan:      Treatment Plan       " "   SYMPTOMS; PROBLEMS   MEASURABLE GOALS;    FUNCTIONAL IMPROVEMENT INTERVENTIONS;   GAINS MADE DISCHARGE CRITERIA   Anxiety and fears dealing with his wife. Passivity and then anger and frustrations.  Tired of verbal abuse of wife and her telling him she wants a divorce   1. Continue to learn and practice praful to manage his anxiety and depression.'  A. Increase assertivenss and standing up for himself  B. Work on self -validation.  C. Continue sobriety and being  Active in AA.  D. Couples therapy - (2/10/23)be more honest, open and descriptive . The counselor needs the facts on alcohol use, wife's dependency on him yet ongoing threats of divorce. Do they need a legal separation in order for wife to know she loves him and wants to work on the marriage?  Progress noted in not being reactive to wife. INcrease confidence and assertiveness. Rates self 7 (10 hi) on assertiveness. Started couples therapy Nov. 6-8 session.  Replace individual therapy with couples therapy and 2 male friends.    Social isolation in terms of outside of AA. No close male friends he can call and do something with socially outside of AA.    2. Make 2 friends whom he can spend time with outside of work and/or AA.  A. Reach out and connect on a deeper level with male friends.  B. Stand up for self more around \"couples friends\" when wife around.  C. Reconnect with friends in [place]. 6-8 core friends. Song for each of them.    Speaking up with wife when with couples. The next step is finding a hobby or activity to do with males.Bert from old job & _____.  If wife chooses not to work on the marriage--he needs a support system outside of work.  Progress noted in terms of identifying a few males.  He does have brothers he has connected more and they have sobriety In common.      Date of most recent treatment plan: 6/09/2023  Date next treatment plan due: 9/09/2023     Psychotherapy services during this visit included myself and the patient. Patient " agreed with treatment plan on 6/09/2023 .  Unable to sign in person due to visit being conducted through telehealth due to COVID-19.

## 2023-07-14 ENCOUNTER — VIRTUAL VISIT (OUTPATIENT)
Dept: PSYCHIATRY | Facility: CLINIC | Age: 63
End: 2023-07-14
Attending: PSYCHOLOGIST
Payer: COMMERCIAL

## 2023-07-14 DIAGNOSIS — F50.89 ATYPICAL EATING DISORDER: ICD-10-CM

## 2023-07-14 DIAGNOSIS — F41.1 GENERALIZED ANXIETY DISORDER: ICD-10-CM

## 2023-07-14 DIAGNOSIS — F33.0 MILD EPISODE OF RECURRENT MAJOR DEPRESSIVE DISORDER (H): Primary | ICD-10-CM

## 2023-07-14 PROCEDURE — 90837 PSYTX W PT 60 MINUTES: CPT | Mod: VID | Performed by: PSYCHOLOGIST

## 2023-07-14 NOTE — PROGRESS NOTES
"Individual Therapy, 55 minutes  [facility] from [facility]  Date: 7/14/23    Diagonosis:  Major Depression, moderate, Generalized anxiety, Alcohol dependency, in full Remissionk    Video-Visit Details    Type of service:  Video Visit    Video Start Time (time video started):3:10pm     Video End Time (time video stopped )4:15pm    Originating Location (pt. Location): [facility]/workplace      Distant Location (provider location):  work/onsite    Mode of Communication:  Video Conference via [email or web address]    Physician has received verbal consent for a Video Visit from the patient? Yes      Rocio Benavides, PhD LP          S/O:\"Corina  Is still around--July 1st is past  (She  Had made 7/1 the deadline when the relationship would end). Bert reported that he talked to wife about not telling him the litany of things she has done when he walks int the home from work--which implies he does nothing.. )  I never really talked with the about that.\" Sheseems like she is drinking less. We discussed how it is his decision , of course, however, I want to assess \"avoidance\". He reports that he is NOT afraid aid of wife's anger.  Bert came in and stated above and reported that  Goal to have 2 friends he can share with regularly 1) Bert from his old work that lives close; 2) Friend in English--\"I sent a father's Day greetings and picuurtes of Lisa--I had not told him that Deirdre is Black       Other issues discussed.  1.   . Long hx of \"being a people pleaser. And avoidant. He reported that he has been standing up for himself more at work and at home recently with Corina. .     2.  Termination issues--until he utilizes the assertiiveness and interrupts avoidance--we may eventually meet monthly and then terminate. He made so much progress and now in a holding pattern with marriage. The focus needs to now be on making 2 male friends he can see regularly --not just AA.   3.Bert has activated around his \"Step work in " "AA.\"  He met yesterday with colleague Christopher and Yennifer  who had to fire him back when they found out he was not licensed.  It went very well. He has others to do And eventually  resentments against  Corina-  4 previous session information:    Corina's mother drank and Not close to her. Dad was a doctor--Corina's sister completed  suicide at age of 50 after their mother .--she was just a year younger than Corina. This sister went to a convent and then had an  which she reportedly \"never got over.\"  6 last session talked about how his mother was Mother bipolar--depressive. Younger brother--manic Oldest brother both manic and depressive.  5. previous session:  Meeting with sponsor re: marriage and how to handle wife's drinking and ongoing threats for a divorce. . Is he too tolerant--quality of life. Bert reported \"no leora at home\" only with daughter and granddaughter.      .   .    Pt did not discuss any changes in his medications.    Assessment;  Bert came on time-. Verbal and engaging. They are no longer in  marriage counseling and not lookng for one. . . Bert believes that his wife doesn't know what he has to change. Followed up  regarding his fears and the possiblity that a legal separation or just a separation need in order for wife to see how he has made changes, can stand up for himself and that she is an alcoholic. Somehow need to change the patterns or he will continue to feel depression  and sad and not want to retire and doesn't like to  go home? Steph is  due to wife continued to drink when he became sober--does he need a second sponsor.    Plan: Continue to see bi-weekly to work on goals. Find a new couples therapist-Therapist recommends : Gal Serrato-- Continued need to work on 2 friendships (one is Christopher the former president who is retired and talking to sponsor about wife's drinking and/or get a second sponsor.  Doing relationship repair with wife and daughter. Stand up for " "himself with wife--if she threatens a divorce again??  Goals: Treatment plan:      Treatment Plan          SYMPTOMS; PROBLEMS   MEASURABLE GOALS;    FUNCTIONAL IMPROVEMENT INTERVENTIONS;   GAINS MADE DISCHARGE CRITERIA   Anxiety and fears dealing with his wife. Passivity and then anger and frustrations.  Tired of verbal abuse of wife and her telling him she wants a divorce   1. Continue to learn and practice praful to manage his anxiety and depression.'  A. Increase assertivenss and standing up for himself  B. Work on self -validation.  C. Continue sobriety and being  Active in AA.  D. Couples therapy - (2/10/23)be more honest, open and descriptive . The counselor needs the facts on alcohol use, wife's dependency on him yet ongoing threats of divorce. Do they need a legal separation in order for wife to know she loves him and wants to work on the marriage?  Progress noted in not being reactive to wife. INcrease confidence and assertiveness. Rates self 7 (10 hi) on assertiveness. Started couples therapy Nov. 6-8 session.  Replace individual therapy with couples therapy and 2 male friends.    Social isolation in terms of outside of AA. No close male friends he can call and do something with socially outside of AA.    2. Make 2 friends whom he can spend time with outside of work and/or AA.  A. Reach out and connect on a deeper level with male friends.  B. Stand up for self more around \"couples friends\" when wife around.  C. Reconnect with friends in [place]. 6-8 core friends. Song for each of them.    Speaking up with wife when with couples. The next step is finding a hobby or activity to do with males.Bert from old job & _____.  If wife chooses not to work on the marriage--he needs a support system outside of work.  Progress noted in terms of identifying a few males.  He does have brothers he has connected more and they have sobriety In common.      Date of most recent treatment plan: 6/09/2023  Date next treatment " plan due: 9/09/2023     Psychotherapy services during this visit included myself and the patient. Patient agreed with treatment plan on 6/09/2023 .  Unable to sign in person due to visit being conducted through telehealth due to COVID-19.

## 2023-07-28 ENCOUNTER — VIRTUAL VISIT (OUTPATIENT)
Dept: PSYCHIATRY | Facility: CLINIC | Age: 63
End: 2023-07-28
Attending: PSYCHOLOGIST
Payer: COMMERCIAL

## 2023-07-28 DIAGNOSIS — F41.9 ANXIETY DISORDER, UNSPECIFIED TYPE: Primary | ICD-10-CM

## 2023-07-28 PROCEDURE — 90837 PSYTX W PT 60 MINUTES: CPT | Mod: VID | Performed by: PSYCHOLOGIST

## 2023-07-28 NOTE — PROGRESS NOTES
"Individual Therapy, 60 minutes  Ref[facility] from [facility]  Date: 23    Diagonosis:  Major Depression, moderate, Generalized anxiety, Alcohol dependency, in full Remissionk    Video-Visit Details    Type of service:  Video Visit    Video Start Time (time video started):3:05pm     Video End Time (time video stopped )4:05pm    Originating Location (pt. Location): [facility]workplace      Distant Location (provider location):  work/onsite    Mode of Communication:  Video Conference via [employer]    Physician has received verbal consent for a Video Visit from the patient? Yes      Rocio Benavides, PhD LP          S/O:Bert came in and reported that in some ways things are going better with Jaime yet she is still drinking.   She continues to threaten to leave him-\"but not as often!\". He has not spoken up to her when he comes  home and it seems like she has been drinking.  He says that he is \"speaking up more' but has not confronted her on drinking--recently he suspected that she drank before  before they went  to their daughter's and saw  their grand daughter Lisa.      Other issues discussed.  1.   Bert reported that he is noticing his conflict avoidance and ways for him to stand up for himself.     2.  Termination issues--until he utilizes the assertiiveness and interrupts avoidance--we may eventually meet monthly and then terminate. He made so much progress and now in a holding pattern with marriage. The focus needs to now be on making 2 male friends he can see regularly --not just AA.    4 previous session information:    Corina's mother drank and Not close to her.Her  Dad was a doctor--Corina's sister completed  suicide at age of 50 after their mother .--she was just a year younger than Corina. This sister went to a convent and then had an  which she reportedly \"never got over.\" .   .    Pt did not discuss any changes in his medications.    Assessment;  Bert came on " "time-. Verbal and engaging. They are no longer in  marriage counseling and not lookng for one. . . Bert believes that his wife doesn't know what she has to change.Patel seems pretty clear that he \"doesn't want to rock the boat\" and that if he is too confrontational  It would increase  the possiblity that a legal separation or just a separation need in order for wife to see how he has made changes, can stand up for himself and that she is an alcoholic. Somehow need to change the patterns or he will continue to feel depression  and sad and not want to retire and doesn't like to  go home? Sponsor is  due to wife continued to drink when he became sober--does he need a second sponsor.    Plan: Continue to see bi-weekly to work on goals. Find a new couples therapist-Therapist recommends : Gal Serrato-- Continued need to work on 2 friendships (one is Christopher the former president who is retired and talking to sponsor about wife's drinking and/or get a second sponsor.  Doing relationship repair with wife and daughter. Stand up for himself with wife--if she threatens a divorce again??  Goals: Treatment plan:      Treatment Plan          SYMPTOMS; PROBLEMS   MEASURABLE GOALS;    FUNCTIONAL IMPROVEMENT INTERVENTIONS;   GAINS MADE DISCHARGE CRITERIA   Anxiety and fears dealing with his wife. Passivity and then anger and frustrations.  Tired of verbal abuse of wife and her telling him she wants a divorce   1. Continue to learn and practice praful to manage his anxiety and depression.'  A. Increase assertivenss and standing up for himself  B. Work on self -validation.  C. Continue sobriety and being  Active in AA.  D. Couples therapy - (2/10/23)be more honest, open and descriptive . The counselor needs the facts on alcohol use, wife's dependency on him yet ongoing threats of divorce. Do they need a legal separation in order for wife to know she loves him and wants to work on the marriage?  Progress noted in not being " "reactive to wife. INcrease confidence and assertiveness. Rates self 7 (10 hi) on assertiveness. Started couples therapy Nov. 6-8 session.  Replace individual therapy with couples therapy and 2 male friends.    Social isolation in terms of outside of AA. No close male friends he can call and do something with socially outside of AA.    2. Make 2 friends whom he can spend time with outside of work and/or AA.  A. Reach out and connect on a deeper level with male friends.  B. Stand up for self more around \"couples friends\" when wife around.  C. Reconnect with friends in [place]. 6-8 core friends. Song for each of them.    Speaking up with wife when with couples. The next step is finding a hobby or activity to do with males.Bert from old job & _____.  If wife chooses not to work on the marriage--he needs a support system outside of work.  Progress noted in terms of identifying a few males.  He does have brothers he has connected more and they have sobriety In common.      Date of most recent treatment plan: 6/09/2023  Date next treatment plan due: 9/09/2023     Psychotherapy services during this visit included myself and the patient. Patient agreed with treatment plan on 6/09/2023 .  Unable to sign in person due to visit being conducted through telehealth due to COVID-19.   "

## 2023-08-18 ENCOUNTER — VIRTUAL VISIT (OUTPATIENT)
Dept: PSYCHIATRY | Facility: CLINIC | Age: 63
End: 2023-08-18
Attending: PSYCHOLOGIST
Payer: COMMERCIAL

## 2023-08-18 DIAGNOSIS — F33.0 MILD EPISODE OF RECURRENT MAJOR DEPRESSIVE DISORDER (H): Primary | ICD-10-CM

## 2023-08-18 DIAGNOSIS — F41.9 ANXIETY DISORDER, UNSPECIFIED TYPE: ICD-10-CM

## 2023-08-18 PROCEDURE — 90853 GROUP PSYCHOTHERAPY: CPT | Mod: VID | Performed by: PSYCHOLOGIST

## 2023-08-28 NOTE — PROGRESS NOTES
"Individual Therapy, 60 minutes  [facility]  Date: 23    Diagonosis:  Major Depression, moderate, Generalized anxiety, Alcohol dependency, in full Remissionk    Video-Visit Details    Type of service:  Video Visit    Video Start Time (time video started):3:05pm     Video End Time (time video stopped ):4:05pm    Originating Location (pt. Location): [facility]workplace      Distant Location (provider location):  work/onsite    Mode of Communication:  Video Conference via [employer]    Physician has received verbal consent for a Video Visit from the patient? Yes      Rocio Benavides, PhD LP          S/O: :Bert came in and reported that in some ways things are going better with Jaime yet she is still drinking.   She continues to threaten to leave him-\"but not as often!\". He has not spoken up to her when he comes  home and it seems like she has been drinking.  This past week Bert was surprised how his wife reached out to His daughters wife and offered to bring over dinner b/c So[hia was  out of town.  that she drank before  before they went  to their daughter's and saw  their grand daughter Lisa.      Other issues discussed.  1.   Bert reported that he is noticing his conflict avoidance and ways for him to stand up for himself.     2.  Termination issues--until he utilizes the assertiiveness and interrupts avoidance--we may eventually meet monthly and then terminate. He made so much progress and now in a holding pattern with marriage. The focus needs to now be on making 2 male friends he can see regularly --not just AA.    4 previous session information:    Corina's mother drank and Not close to her.Her  Dad was a doctor--Corina's sister completed  suicide at age of 50 after their mother .--she was just a year younger than Corina. This sister went to a convent and then had an  which she reportedly \"never got over.\" .   .    Pt did not discuss any changes in his " "medications.    Assessment;  Bert came on time-. Verbal and engaging. They are no longer in  marriage counseling and not lookng for one. . . Bert believes that his wife doesn't know what she has to change.Patel seems pretty clear that he \"doesn't want to rock the boat\" and that if he is too confrontational  It would increase  the possiblity that a legal separation or just a separation need in order for wife to see how he has made changes, can stand up for himself and that she is an alcoholic. Somehow need to change the patterns or he will continue to feel depression  and sad and not want to retire and doesn't like to  go home? Sponsor is  due to wife continued to drink when he became sober--does he need a second sponsor.    Plan: Continue to see bi-weekly to work on goals. Find a new couples therapist-Therapist recommends : Gal Serrato-- Continued need to work on 2 friendships (one is Christopher the former president who is retired and talking to sponsor about wife's drinking and/or get a second sponsor.  Doing relationship repair with wife and daughter. Stand up for himself with wife--if she threatens a divorce again??  Goals: Treatment plan:      Treatment Plan          SYMPTOMS; PROBLEMS   MEASURABLE GOALS;    FUNCTIONAL IMPROVEMENT INTERVENTIONS;   GAINS MADE DISCHARGE CRITERIA   Anxiety and fears dealing with his wife. Passivity and then anger and frustrations.  Tired of verbal abuse of wife and her telling him she wants a divorce   1. Continue to learn and practice praful to manage his anxiety and depression.'  A. Increase assertivenss and standing up for himself  B. Work on self -validation.  C. Continue sobriety and being  Active in AA.  D. Couples therapy - (2/10/23)be more honest, open and descriptive . The counselor needs the facts on alcohol use, wife's dependency on him yet ongoing threats of divorce. Do they need a legal separation in order for wife to know she loves him and wants to work on the " "marriage?  Progress noted in not being reactive to wife. INcrease confidence and assertiveness. Rates self 7 (10 hi) on assertiveness. Started couples therapy Nov. 6-8 session.  Replace individual therapy with couples therapy and 2 male friends.    Social isolation in terms of outside of AA. No close male friends he can call and do something with socially outside of AA.    2. Make 2 friends whom he can spend time with outside of work and/or AA.  A. Reach out and connect on a deeper level with male friends.  B. Stand up for self more around \"couples friends\" when wife around.  C. Reconnect with friends in [place]. 6-8 core friends. Song for each of them.    Speaking up with wife when with couples. The next step is finding a hobby or activity to do with males.Bert from old job & _____.  If wife chooses not to work on the marriage--he needs a support system outside of work.  Progress noted in terms of identifying a few males.  He does have brothers he has connected more and they have sobriety In common.      Date of most recent treatment plan: 6/09/2023  Date next treatment plan due: 9/09/2023     Psychotherapy services during this visit included myself and the patient. Patient agreed with treatment plan on 6/09/2023 .  Unable to sign in person due to visit being conducted through telehealth due to COVID-19.   "

## 2023-09-01 ENCOUNTER — VIRTUAL VISIT (OUTPATIENT)
Dept: PSYCHIATRY | Facility: CLINIC | Age: 63
End: 2023-09-01
Attending: PSYCHOLOGIST
Payer: COMMERCIAL

## 2023-09-01 DIAGNOSIS — F33.0 MILD EPISODE OF RECURRENT MAJOR DEPRESSIVE DISORDER (H): Primary | ICD-10-CM

## 2023-09-01 DIAGNOSIS — F41.1 GENERALIZED ANXIETY DISORDER: ICD-10-CM

## 2023-09-01 PROCEDURE — 90837 PSYTX W PT 60 MINUTES: CPT | Mod: VID | Performed by: PSYCHOLOGIST

## 2023-09-11 NOTE — PROGRESS NOTES
"Individual Therapy, 60 minutes  [facility]  Date: 23    Diagonosis:  Major Depression, moderate, Generalized anxiety, Alcohol dependency, in full Remissionk    Video-Visit Details    Type of service:  Video Visit    Video Start Time (time video started):3:05pm     Video End Time (time video stopped ):4:00pm    Originating Location (pt. Location): [facility]workplace      Distant Location (provider location):  work/onsite    Mode of Communication:  Video Conference via [facility]    Physician has received verbal consent for a Video Visit from the patient? Yes      Rocio Benavides, PhD LP          S/O: :Bert came on time. He reported that his brother came into town and spent several days with he and his wife. Bert described how he felt on edge with Jaime getting drunk and his brother noticing it and Jaime making negative comments about Bert in front of brother. He reported that he did speak up for himself and ended the conversation.  He reported that she is continuing to threaten \"leaving\" and that he is finding himself less tolerant. . He is working really hard not to yell or be rude to her.         Other issues discussed.  1.   Termination issues---we may eventually meet monthly and then terminate. He made so much progress and now in a holding pattern with marriage. The focus needs to now be on making 2 male friends he can see regularly --not just AA. Will discuss next session shifting to monthly and replacing therapy.     2. previous session information:    Corina's mother drank and Not close to her.Her  Dad was a doctor--Corina's sister completed  suicide at age of 50 after their mother .--she was just a year younger than Corina. This sister went to a convent and then had an  which she reportedly \"never got over.\" .    3. Work is going well. He is feeling real good about \"Making amends\" with some of his co-workers who supported him through his loss and " getting rehired after he got sober and got his license reinstated.    .    Pt did not discuss any changes in his medications.    Assessment;  Bert came on time-. Verbal and engaging. They are no longer in  marriage counseling and not lookng for one. . . Bert appears to be less tolerant of his wifes drinking and negative remarks and is trying to stand up for himself . Not talking or confronting her while she is drunk is important.  Somehow need to change the patterns or he will continue to feel depression  and sad and not want to retire and doesn't like to  go home? Sponsor is  due to wife continued to drink when he became sober--does he need a second sponsor.No movement towards making the 2 friends yet.     Plan: Continue to see bi-weekly to work on goals. Find a new couples therapist-Therapist recommends : Gal Serrato-- Continued need to work on 2 friendships (one is Christopher the former president who is retired and talking to sponsor about wife's drinking and/or get a second sponsor.  Doing relationship repair with wife and daughter. Stand up for himself with wife--if she threatens a divorce again??  Goals: Treatment plan:      Treatment Plan          SYMPTOMS; PROBLEMS   MEASURABLE GOALS;    FUNCTIONAL IMPROVEMENT INTERVENTIONS;   GAINS MADE DISCHARGE CRITERIA   Anxiety and fears dealing with his wife. Passivity and then anger and frustrations.  Tired of verbal abuse of wife and her telling him she wants a divorce   1. Continue to learn and practice praful to manage his anxiety and depression.'  A. Increase assertivenss and standing up for himself  B. Work on self -validation.  C. Continue sobriety and being  Active in AA.  D. Couples therapy - (2/10/23)be more honest, open and descriptive . The counselor needs the facts on alcohol use, wife's dependency on him yet ongoing threats of divorce. Do they need a legal separation in order for wife to know she loves him and wants to work on the marriage?  Progress  "noted in not being reactive to wife. INcrease confidence and assertiveness. Rates self 7 (10 hi) on assertiveness. Started couples therapy Nov. 6-8 session.  Replace individual therapy with couples therapy and 2 male friends.    Social isolation in terms of outside of AA. No close male friends he can call and do something with socially outside of AA.    2. Make 2 friends whom he can spend time with outside of work and/or AA.  A. Reach out and connect on a deeper level with male friends.  B. Stand up for self more around \"couples friends\" when wife around.  C. Reconnect with friends in [place]. 6-8 core friends. Song for each of them.    Speaking up with wife when with couples. The next step is finding a hobby or activity to do with males.Bert from old job & _____.  If wife chooses not to work on the marriage--he needs a support system outside of work.  Progress noted in terms of identifying a few males.  He does have brothers he has connected more and they have sobriety In common.      Date of most recent treatment plan: 6/09/2023  Date next treatment plan due: 9/09/2023     Psychotherapy services during this visit included myself and the patient. Patient agreed with treatment plan on 6/09/2023 .  Unable to sign in person due to visit being conducted through telehealth due to COVID-19.   "

## 2023-10-20 ENCOUNTER — VIRTUAL VISIT (OUTPATIENT)
Dept: PSYCHIATRY | Facility: CLINIC | Age: 63
End: 2023-10-20
Attending: PSYCHOLOGIST
Payer: COMMERCIAL

## 2023-10-20 DIAGNOSIS — F33.0 MILD EPISODE OF RECURRENT MAJOR DEPRESSIVE DISORDER (H): Primary | ICD-10-CM

## 2023-10-20 DIAGNOSIS — F41.1 GENERALIZED ANXIETY DISORDER: ICD-10-CM

## 2023-10-20 PROCEDURE — 90837 PSYTX W PT 60 MINUTES: CPT | Mod: VID | Performed by: PSYCHOLOGIST

## 2023-10-29 NOTE — PROGRESS NOTES
"Individual Therapy, 60 minutes  [facility]  Date:10/20/23    Diagonosis:  Major Depression, moderate, Generalized anxiety, Alcohol dependency, in full Remission    Video-Visit Details    Type of service:  Video Visit    Video Start Time (time video started):3:05pm     Video End Time (time video stopped ):4:00pm    Originating Location (pt. Location): [facility]/workplace      Distant Location (provider location):  work/onsite    Mode of Communication:  Video Conference via uShip    Physician has received verbal consent for a Video Visit from the patient? Yes      Rocio Benavides, PhD LP          S/O: \"Everything is about the same. I did get a chance to spend some time alone with Radha which was nice though.\"  :Bert came on time. He reported that  his grown daughter  is asking him questions about how her mom is doing and also how dad is doing with mom and their relationship is not very good..  Bert said that he talked to his daughter about \"mom's drinking\" and  how mom has been cancelling more events.  He reported that his daughter commented on the fact that he \"doesn't seem very happy.\"  The plan is for him to perhaps write out what he will say to his wife the next time she threatens divorce. What is the potential plan?     Other issues discussed.  1.   Termination issues---we may eventually meet monthly and then terminate. He made so much progress and now in a holding pattern with marriage. The focus needs to now be on making 2 male friends he can see regularly --not just AA. Will discuss next session shifting to monthly and replacing therapy.     2. previous session information:    Corina's mother drank and Not close to her.Her  Dad was a doctor--Corina's sister completed  suicide at age of 50 after their mother .--she was just a year younger than Corina. This sister went to a convent and then had an  which she reportedly \"never got over.\" .    3. Work is " "going well. He had to cancel last session due to a big contract deadlines.     .    Pt did not discuss any changes in his medications.    Assessment;  Bert came on time-. Verbal and engaging. They are no longer in  marriage counseling and not lookng for one. . . Bert turned 63 in July and appears to be evaluating his stage in life  and is less  tolerant of his wife's drinking and threatening divorce . He is being more open with daughter regarding Jami's increase alcohol . Daughter has been asking about his sadness/unhappiness. Bert reports that he has been avoidant again and knows he needs to \"stand up for himself.'  .  Although Bert does NOT want a divorc, he is now thinking that possibly a separation or legal separation so Corina can accurately assess how helpful Bert is and be \"appreciated.\" There seems be be an underlying thought that Corina is going to \"not  going to live past 68 ---her sister actually had a completed suicide when she was 50. .  We never solved the problem whether he needs a second sponsor.No movement towards making the 2 friends yet.     Plan: Continue to see bi-weekly to work on goals. Find a new couples therapist-Therapist recommends : Gal Serrato-- Continued need to work on 2 friendships (one is Christopher the former president who is retired and talking to sponsor about wife's drinking and/or get a second sponsor.  Writeout what he wants to  say to wife the next time she threatens divorce.  Stand up for himself with wife--if she threatens a divorce again??  Goals: Treatment plan:      Treatment Plan          SYMPTOMS; PROBLEMS   MEASURABLE GOALS;    FUNCTIONAL IMPROVEMENT INTERVENTIONS;   GAINS MADE DISCHARGE CRITERIA   Anxiety and fears dealing with his wife. Passivity and then anger and frustrations.  Tired of verbal abuse of wife and her telling him she wants a divorce   1. Continue to learn and practice praful to manage his anxiety and depression.'  A. Increase assertivenss and " "standing up for himself  B. Work on self -validation.  C. Continue sobriety and being  Active in AA.  D. Couples therapy - (2/10/23)be more honest, open and descriptive . The counselor needs the facts on alcohol use, wife's dependency on him yet ongoing threats of divorce. Do they need a legal separation in order for wife to know she loves him and wants to work on the marriage?  Progress noted in not being reactive to wife. INcrease confidence and assertiveness. Rates self 7 (10 hi) on assertiveness. Started couples therapy Nov. 6-8 session.  Replace individual therapy with couples therapy and 2 male friends.    Social isolation in terms of outside of AA. No close male friends he can call and do something with socially outside of AA.    2. Make 2 friends whom he can spend time with outside of work and/or AA.  A. Reach out and connect on a deeper level with male friends.  B. Stand up for self more around \"couples friends\" when wife around.  C. Reconnect with friends in [place]. 6-8 core friends. Song for each of them.    Speaking up with wife when with couples. The next step is finding a hobby or activity to do with males.Bert from old job & _____.  If wife chooses not to work on the marriage--he needs a support system outside of work.  Progress noted in terms of identifying a few males.  He does have brothers he has connected more and they have sobriety In common.      Date of most recent treatment plan: 6/09/2023  Date next treatment plan due: 9/09/2023--next session.      Psychotherapy services during this visit included myself and the patient. Patient agreed with treatment plan on 6/09/2023 .  Unable to sign in person due to visit being conducted through telehealth due to COVID-19.   "

## 2023-11-03 ENCOUNTER — VIRTUAL VISIT (OUTPATIENT)
Dept: PSYCHIATRY | Facility: CLINIC | Age: 63
End: 2023-11-03
Attending: PSYCHOLOGIST
Payer: COMMERCIAL

## 2023-11-03 DIAGNOSIS — F33.0 MILD EPISODE OF RECURRENT MAJOR DEPRESSIVE DISORDER (H): Primary | ICD-10-CM

## 2023-11-03 DIAGNOSIS — F41.1 GENERALIZED ANXIETY DISORDER: ICD-10-CM

## 2023-11-03 PROCEDURE — 90837 PSYTX W PT 60 MINUTES: CPT | Mod: VID | Performed by: PSYCHOLOGIST
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
## 2023-11-17 ENCOUNTER — VIRTUAL VISIT (OUTPATIENT)
Dept: PSYCHIATRY | Facility: CLINIC | Age: 63
End: 2023-11-17
Attending: PSYCHOLOGIST
Payer: COMMERCIAL

## 2023-11-17 DIAGNOSIS — F41.1 GENERALIZED ANXIETY DISORDER: ICD-10-CM

## 2023-11-17 DIAGNOSIS — F33.0 MILD EPISODE OF RECURRENT MAJOR DEPRESSIVE DISORDER (H): Primary | ICD-10-CM

## 2023-11-17 PROCEDURE — 90837 PSYTX W PT 60 MINUTES: CPT | Mod: VID | Performed by: PSYCHOLOGIST
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
## 2024-01-26 ENCOUNTER — VIRTUAL VISIT (OUTPATIENT)
Dept: PSYCHIATRY | Facility: CLINIC | Age: 64
End: 2024-01-26
Attending: PSYCHOLOGIST
Payer: COMMERCIAL

## 2024-01-26 DIAGNOSIS — F41.1 GENERALIZED ANXIETY DISORDER: ICD-10-CM

## 2024-01-26 DIAGNOSIS — F33.0 MILD EPISODE OF RECURRENT MAJOR DEPRESSIVE DISORDER (H): Primary | ICD-10-CM

## 2024-01-26 PROCEDURE — 90837 PSYTX W PT 60 MINUTES: CPT | Mod: 95 | Performed by: PSYCHOLOGIST
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
## 2024-02-09 ENCOUNTER — VIRTUAL VISIT (OUTPATIENT)
Dept: PSYCHIATRY | Facility: CLINIC | Age: 64
End: 2024-02-09
Attending: PSYCHOLOGIST
Payer: COMMERCIAL

## 2024-02-09 DIAGNOSIS — F33.0 MILD EPISODE OF RECURRENT MAJOR DEPRESSIVE DISORDER (H): Primary | ICD-10-CM

## 2024-02-09 DIAGNOSIS — F41.1 GENERALIZED ANXIETY DISORDER: ICD-10-CM

## 2024-02-09 PROCEDURE — 90837 PSYTX W PT 60 MINUTES: CPT | Mod: 95 | Performed by: PSYCHOLOGIST
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
## 2024-02-23 ENCOUNTER — VIRTUAL VISIT (OUTPATIENT)
Dept: PSYCHIATRY | Facility: CLINIC | Age: 64
End: 2024-02-23
Attending: PSYCHOLOGIST
Payer: COMMERCIAL

## 2024-02-23 DIAGNOSIS — F41.1 GENERALIZED ANXIETY DISORDER: ICD-10-CM

## 2024-02-23 DIAGNOSIS — F33.0 MILD EPISODE OF RECURRENT MAJOR DEPRESSIVE DISORDER (H): Primary | ICD-10-CM

## 2024-02-23 DIAGNOSIS — F10.11 ALCOHOL ABUSE, IN REMISSION: ICD-10-CM

## 2024-02-23 PROCEDURE — 90837 PSYTX W PT 60 MINUTES: CPT | Mod: 95 | Performed by: PSYCHOLOGIST

## 2024-03-03 ENCOUNTER — OFFICE VISIT (OUTPATIENT)
Dept: URGENT CARE | Facility: URGENT CARE | Age: 64
End: 2024-03-03
Payer: COMMERCIAL

## 2024-03-03 VITALS
OXYGEN SATURATION: 97 % | SYSTOLIC BLOOD PRESSURE: 130 MMHG | HEART RATE: 76 BPM | TEMPERATURE: 97.6 F | HEIGHT: 67 IN | BODY MASS INDEX: 23.54 KG/M2 | DIASTOLIC BLOOD PRESSURE: 73 MMHG | WEIGHT: 150 LBS

## 2024-03-03 DIAGNOSIS — T16.1XXA FOREIGN BODY OF RIGHT EAR, INITIAL ENCOUNTER: Primary | ICD-10-CM

## 2024-03-03 PROBLEM — F41.1 GENERALIZED ANXIETY DISORDER: Status: ACTIVE | Noted: 2024-03-03

## 2024-03-03 PROCEDURE — 69200 CLEAR OUTER EAR CANAL: CPT | Mod: LT | Performed by: INTERNAL MEDICINE

## 2024-03-03 RX ORDER — SERTRALINE HYDROCHLORIDE 25 MG/1
1 TABLET, FILM COATED ORAL EVERY MORNING
COMMUNITY
Start: 2024-02-14

## 2024-03-03 NOTE — PROGRESS NOTES
"ASSESSMENT AND PLAN:      ICD-10-CM    1. Foreign body of right ear, initial encounter  T16.1XXA REMOVE FOREIGN BODY SIMPLE      Successful removal of foreign body which was the rubber end of his hearing aid.  Call or return to clinic if symptoms worsen or fail to improve as anticipated.      Verena Sadler MD  [facility]    Subjective     Bert NEPTALI Deady is a 63 year old who presents for Patient presents with:  Urgent Care  Foreign Body in Ear: Pt in clinic to have eval for possible foreign body in ear.      a new patient of [facility].      Patient cleaned hearing aid yesterday evening.  He replaced the hearing aid in his left ear and when he  removed it again, the rubber end remained in the canal.  He was unable to remove the rubber part of the hearing aid himself  He is here today requesting removal      Review of Systems        Objective    /73   Pulse 76   Temp 97.6  F (36.4  C) (Temporal)   Ht 1.702 m (5' 7\")   Wt 68 kg (150 lb)   SpO2 97%   BMI 23.49 kg/m    Physical Exam  Vitals reviewed.   Constitutional:       Appearance: Normal appearance.   HENT:      Right Ear: Tympanic membrane normal. There is no impacted cerumen.      Left Ear: There is no impacted cerumen.      Ears:      Comments: Black rubber and noted in his left ear canal.  Removed with alligator clamp successfully.  Patient tolerated procedure well.    Able to visualize normal tympanic membrane after removal.  Neurological:      Mental Status: He is alert.                  "
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
## 2024-03-17 ENCOUNTER — HEALTH MAINTENANCE LETTER (OUTPATIENT)
Age: 64
End: 2024-03-17

## 2024-03-22 ENCOUNTER — VIRTUAL VISIT (OUTPATIENT)
Dept: PSYCHIATRY | Facility: CLINIC | Age: 64
End: 2024-03-22
Attending: PSYCHOLOGIST
Payer: COMMERCIAL

## 2024-03-22 DIAGNOSIS — F43.10 PTSD (POST-TRAUMATIC STRESS DISORDER): ICD-10-CM

## 2024-03-22 DIAGNOSIS — F41.1 GENERALIZED ANXIETY DISORDER: Primary | ICD-10-CM

## 2024-03-22 PROCEDURE — 90837 PSYTX W PT 60 MINUTES: CPT | Mod: 95 | Performed by: PSYCHOLOGIST
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
## 2024-04-26 ENCOUNTER — VIRTUAL VISIT (OUTPATIENT)
Dept: PSYCHIATRY | Facility: CLINIC | Age: 64
End: 2024-04-26
Attending: PSYCHOLOGIST
Payer: COMMERCIAL

## 2024-04-26 DIAGNOSIS — F43.10 PTSD (POST-TRAUMATIC STRESS DISORDER): ICD-10-CM

## 2024-04-26 DIAGNOSIS — F41.1 GENERALIZED ANXIETY DISORDER: Primary | ICD-10-CM

## 2024-04-26 PROCEDURE — 90837 PSYTX W PT 60 MINUTES: CPT | Mod: 95 | Performed by: PSYCHOLOGIST
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
## 2024-05-17 ENCOUNTER — VIRTUAL VISIT (OUTPATIENT)
Dept: PSYCHIATRY | Facility: CLINIC | Age: 64
End: 2024-05-17
Attending: PSYCHOLOGIST
Payer: COMMERCIAL

## 2024-05-17 DIAGNOSIS — F41.1 GENERALIZED ANXIETY DISORDER: Primary | ICD-10-CM

## 2024-05-17 DIAGNOSIS — F43.10 PTSD (POST-TRAUMATIC STRESS DISORDER): ICD-10-CM

## 2024-05-17 PROCEDURE — 90837 PSYTX W PT 60 MINUTES: CPT | Mod: 95 | Performed by: PSYCHOLOGIST
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
## 2024-06-28 ENCOUNTER — VIRTUAL VISIT (OUTPATIENT)
Dept: PSYCHIATRY | Facility: CLINIC | Age: 64
End: 2024-06-28
Attending: PSYCHOLOGIST
Payer: COMMERCIAL

## 2024-06-28 DIAGNOSIS — F43.10 PTSD (POST-TRAUMATIC STRESS DISORDER): ICD-10-CM

## 2024-06-28 DIAGNOSIS — F33.0 MILD EPISODE OF RECURRENT MAJOR DEPRESSIVE DISORDER (H): Primary | ICD-10-CM

## 2024-06-28 DIAGNOSIS — F41.1 GENERALIZED ANXIETY DISORDER: ICD-10-CM

## 2024-06-28 PROCEDURE — 90837 PSYTX W PT 60 MINUTES: CPT | Mod: 95 | Performed by: PSYCHOLOGIST
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
## 2024-07-19 ENCOUNTER — VIRTUAL VISIT (OUTPATIENT)
Dept: PSYCHIATRY | Facility: CLINIC | Age: 64
End: 2024-07-19
Attending: PSYCHOLOGIST
Payer: COMMERCIAL

## 2024-07-19 DIAGNOSIS — F43.10 PTSD (POST-TRAUMATIC STRESS DISORDER): ICD-10-CM

## 2024-07-19 DIAGNOSIS — F10.11 ALCOHOL ABUSE, IN REMISSION: ICD-10-CM

## 2024-07-19 DIAGNOSIS — F41.1 GENERALIZED ANXIETY DISORDER: ICD-10-CM

## 2024-07-19 DIAGNOSIS — F33.0 MILD EPISODE OF RECURRENT MAJOR DEPRESSIVE DISORDER (H): Primary | ICD-10-CM

## 2024-07-19 PROCEDURE — 90837 PSYTX W PT 60 MINUTES: CPT | Mod: 95 | Performed by: PSYCHOLOGIST
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
## 2024-08-16 ENCOUNTER — VIRTUAL VISIT (OUTPATIENT)
Dept: PSYCHIATRY | Facility: CLINIC | Age: 64
End: 2024-08-16
Attending: PSYCHOLOGIST
Payer: COMMERCIAL

## 2024-08-16 DIAGNOSIS — F10.11 ALCOHOL ABUSE, IN REMISSION: ICD-10-CM

## 2024-08-16 DIAGNOSIS — F33.0 MILD EPISODE OF RECURRENT MAJOR DEPRESSIVE DISORDER (H): Primary | ICD-10-CM

## 2024-08-16 DIAGNOSIS — F41.1 GENERALIZED ANXIETY DISORDER: ICD-10-CM

## 2024-08-16 PROCEDURE — 90837 PSYTX W PT 60 MINUTES: CPT | Mod: 95 | Performed by: PSYCHOLOGIST
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
## 2024-09-13 ENCOUNTER — VIRTUAL VISIT (OUTPATIENT)
Dept: PSYCHIATRY | Facility: CLINIC | Age: 64
End: 2024-09-13
Attending: PSYCHOLOGIST
Payer: COMMERCIAL

## 2024-09-13 DIAGNOSIS — F33.0 MILD EPISODE OF RECURRENT MAJOR DEPRESSIVE DISORDER (H): Primary | ICD-10-CM

## 2024-09-13 PROCEDURE — 90837 PSYTX W PT 60 MINUTES: CPT | Mod: 95 | Performed by: PSYCHOLOGIST
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
## 2024-10-04 ENCOUNTER — VIRTUAL VISIT (OUTPATIENT)
Dept: PSYCHIATRY | Facility: CLINIC | Age: 64
End: 2024-10-04
Attending: PSYCHOLOGIST
Payer: COMMERCIAL

## 2024-10-04 DIAGNOSIS — F41.1 GENERALIZED ANXIETY DISORDER: ICD-10-CM

## 2024-10-04 DIAGNOSIS — F10.11 ALCOHOL ABUSE, IN REMISSION: ICD-10-CM

## 2024-10-04 DIAGNOSIS — F43.10 PTSD (POST-TRAUMATIC STRESS DISORDER): ICD-10-CM

## 2024-10-04 DIAGNOSIS — F33.0 MILD EPISODE OF RECURRENT MAJOR DEPRESSIVE DISORDER (H): Primary | ICD-10-CM

## 2024-10-04 PROCEDURE — 90837 PSYTX W PT 60 MINUTES: CPT | Mod: 95 | Performed by: PSYCHOLOGIST
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
## 2024-10-25 ENCOUNTER — VIRTUAL VISIT (OUTPATIENT)
Dept: PSYCHIATRY | Facility: CLINIC | Age: 64
End: 2024-10-25
Attending: PSYCHOLOGIST
Payer: COMMERCIAL

## 2024-10-25 DIAGNOSIS — F10.11 ALCOHOL ABUSE, IN REMISSION: ICD-10-CM

## 2024-10-25 DIAGNOSIS — F41.1 GENERALIZED ANXIETY DISORDER: ICD-10-CM

## 2024-10-25 DIAGNOSIS — F33.0 MILD EPISODE OF RECURRENT MAJOR DEPRESSIVE DISORDER (H): Primary | ICD-10-CM

## 2024-10-25 PROCEDURE — 90837 PSYTX W PT 60 MINUTES: CPT | Mod: 95 | Performed by: PSYCHOLOGIST
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
## 2024-11-22 ENCOUNTER — APPOINTMENT (OUTPATIENT)
Dept: PSYCHIATRY | Facility: CLINIC | Age: 64
End: 2024-11-22
Attending: PSYCHOLOGIST
Payer: COMMERCIAL

## 2024-11-22 DIAGNOSIS — F10.11 ALCOHOL ABUSE, IN REMISSION: ICD-10-CM

## 2024-11-22 DIAGNOSIS — F41.1 GENERALIZED ANXIETY DISORDER: ICD-10-CM

## 2024-11-22 DIAGNOSIS — F43.10 PTSD (POST-TRAUMATIC STRESS DISORDER): ICD-10-CM

## 2024-11-22 DIAGNOSIS — F33.0 MILD EPISODE OF RECURRENT MAJOR DEPRESSIVE DISORDER (H): Primary | ICD-10-CM

## 2024-11-22 PROCEDURE — 90837 PSYTX W PT 60 MINUTES: CPT | Mod: 95 | Performed by: PSYCHOLOGIST
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
## 2024-12-13 ENCOUNTER — APPOINTMENT (OUTPATIENT)
Dept: PSYCHIATRY | Facility: CLINIC | Age: 64
End: 2024-12-13
Attending: PSYCHOLOGIST
Payer: COMMERCIAL

## 2024-12-13 DIAGNOSIS — F10.939 ALCOHOL WITHDRAWAL WITH COMPLICATION WITH INPATIENT TREATMENT, WITH UNSPECIFIED COMPLICATION (H): ICD-10-CM

## 2024-12-13 DIAGNOSIS — F41.1 GENERALIZED ANXIETY DISORDER: ICD-10-CM

## 2024-12-13 DIAGNOSIS — F33.0 MILD EPISODE OF RECURRENT MAJOR DEPRESSION DURING INFANCY TO EARLY CHILDHOOD (H): Primary | ICD-10-CM

## 2024-12-13 PROCEDURE — 90837 PSYTX W PT 60 MINUTES: CPT | Mod: 95 | Performed by: PSYCHOLOGIST

## 2024-12-17 ENCOUNTER — TRANSCRIBE ORDERS (OUTPATIENT)
Dept: OTHER | Age: 64
End: 2024-12-17

## 2024-12-17 DIAGNOSIS — F52.21 ED (ERECTILE DYSFUNCTION) OF NON-ORGANIC ORIGIN: Primary | ICD-10-CM
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
## 2024-12-22 NOTE — PROGRESS NOTES
UROLOGY CLINIC VISIT    Chief Complaint:   Erectile dysfunction      Referring Provider:  Chela Brambila    Subjective:     Bert Aburto is a 64 year old male with a PMHx significant for those items listed, who is presenting to clinic today to discuss erectile dysfunction post prostatectomy.    S/p radical prostatectomy, had robotic surgery on 7/16/18. Has had ED since his RALP.   Has not tried oral PDE5 or other ED therapies  Has good continence post prostatectomy no longer wearing pads.   PSA checks with PCP, reported undetectable by patient     PD/Curvature none present     He has appropriate METS and good exercise tolerance reported.     PMH: [facility] (incarcerated), RALP, alcohol abuse in remission     Currently the patient has no fever, chills, nausea, vomiting or other signs/symptoms suggestive of acute systemic infection.    PMH  Past Medical History:   Diagnosis Date    Cancer (H)     Substance abuse (H)      PSH  Past Surgical History:   Procedure Laterality Date    GENITOURINARY SURGERY       FAMHx  Family History   Problem Relation Age of Onset    Bipolar Disorder Maternal Grandmother     Mental Illness Maternal Grandmother     Mental Illness Sister     Bipolar Disorder Sister     Mental Illness Brother     Bipolar Disorder Brother      ALLERGY  No Known Allergies  MEDICATIONS  has a current medication list which includes the following prescription(s): sertraline, tadalafil, and amlodipine, and the following Facility-Administered Medications: Self Administer Medications: Behavioral Services.  ROS:  Constitutional: negative  Eyes: negative  Ears/Nose/Throat/Mouth: negative  Respiratory: negative  Cardiovascular: negative  Gastrointestinal: negative  Genito-Urinary: as documented above in HPI  Musculoskeletal: negative  Neurological: negative  Integumentary: negative      Objective:     There were no vitals taken for this visit.   General appearance: Healthy, in no apparent distress  Eyes: No conjunctival  "erythema or drainage.   Pulmonary: Unlabored breathing.  Abdomen: Prior scars from incisions noted: none  Skin: No rashes or ulcers visible  Neuro/Psych: Alert, oriented to person and place, appropriate affect    LABORATORY:  No results found for: \"CREATININE\"  No results found for: \"PSA\"      Assessment:     Bert Aburto is a 64 year old male with a PMHx significant for those items listed, who is presenting to clinic today to discuss erectile dysfunction post prostatectomy.    Plan:      ED (erectile dysfunction) of non-organic origin  Erectile dysfunction after radical prostatectomy    We discussed the pathophysiology of erectile dysfunction and the potential causes including psychogenic, neurogenic, vasculogenic, endocrine, and iatrogenic causes. We discussed the different treatment options including PDE 5 inhibitors, vacuum erection device, intraurethral suppository, intracavernosal injection therapy, and penile implants. We discussed the risks and benefits of each and potential side effects of treatment.   PDE 5 inhibitors: headaches, stuffy nose, visual disturbances, myalgias, heartburn, priapism. He was given instructions to take the medications 1-3 hrs prior to sexual activity and on an empty stomach  ALTHEA: pain, bruising, hematoma, numbness  Intraurethral suppository: pain, urethral burning, light headedness, priapism  Intracavernosal injection: pain, bruising, priapism, build up of scar tissue  IPP: pain, infection bleeding, injury to surrounding structures, possible revision surgery, possible anesthetic complications, possible device failure, possible erosion. Reviewed with patient the important of life style modifications, and exercise to help with the treatment of ED. Discussed the need to follow up with the primary care physician to evaluate and treat cardiovascular disease risk factors.     After a thorough discussion of treatment options he would like to proceed with    - Trial of PDE5i and ALTHEA   - " Plan for Cialis 20, can start at 10 and titrate up. Precautions discussed   - If doing well and stable on oral meds/ALTHEA then can follow up with PCP for medication refills. If medications are not working, patient to follow up in office for examination and further discussion of options     Prostate cancer s/p RALP  - PSA undetectable   - Continue annual PSA checks with PCP

## 2024-12-26 ENCOUNTER — VIRTUAL VISIT (OUTPATIENT)
Dept: UROLOGY | Facility: CLINIC | Age: 64
End: 2024-12-26
Attending: INTERNAL MEDICINE
Payer: COMMERCIAL

## 2024-12-26 DIAGNOSIS — N52.31 ERECTILE DYSFUNCTION AFTER RADICAL PROSTATECTOMY: ICD-10-CM

## 2024-12-26 DIAGNOSIS — F52.21 ED (ERECTILE DYSFUNCTION) OF NON-ORGANIC ORIGIN: Primary | ICD-10-CM

## 2024-12-26 RX ORDER — TADALAFIL 20 MG/1
20 TABLET ORAL DAILY PRN
Qty: 90 TABLET | Refills: 3 | Status: SHIPPED | OUTPATIENT
Start: 2024-12-26 | End: 2025-12-21

## 2024-12-26 ASSESSMENT — PAIN SCALES - GENERAL: PAINLEVEL_OUTOF10: NO PAIN (0)

## 2024-12-26 NOTE — PROGRESS NOTES
Virtual Visit Details    Type of service:  Video Visit     Originating Location (pt. Location): Other work    Distant Location (provider location):  Off-site  Platform used for Video Visit: Rocky     Admission

## 2024-12-26 NOTE — NURSING NOTE
Current patient location: [address] [place] [id number]    Is the patient currently in the state of [place]? YES    Visit mode:VIDEO    If the visit is dropped, the patient can be reconnected by:VIDEO VISIT: Text to cell phone:   Telephone Information:   Mobile [phone]       Will anyone else be joining the visit? NO  (If patient encounters technical issues they should call [phone]    Are changes needed to the allergy or medication list? No    Are refills needed on medications prescribed by this physician? NO    Rooming Documentation:  Questionnaire(s) not done per department protocol    Reason for visit: Consult    Shelby Kocher VVF

## 2025-01-17 ENCOUNTER — VIRTUAL VISIT (OUTPATIENT)
Dept: PSYCHIATRY | Facility: CLINIC | Age: 65
End: 2025-01-17
Attending: PSYCHOLOGIST
Payer: COMMERCIAL

## 2025-01-17 DIAGNOSIS — F41.1 GENERALIZED ANXIETY DISORDER: Primary | ICD-10-CM

## 2025-01-17 DIAGNOSIS — F19.20 CHEMICAL DEPENDENCY (H): ICD-10-CM

## 2025-01-17 PROCEDURE — 90837 PSYTX W PT 60 MINUTES: CPT | Mod: 95 | Performed by: PSYCHOLOGIST
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
## 2025-01-20 NOTE — PROGRESS NOTES
UROLOGY CLINIC VISIT    Chief Complaint:   Erectile dysfunction      Referring Provider:  Chela Brambila    Subjective:     Bert Aburto is a 64 year old male with a PMHx significant for those items listed, who is presenting to clinic today to discuss erectile dysfunction post prostatectomy.    S/p radical prostatectomy, had robotic surgery on 7/16/18. Has had ED since his RALP.   Has not tried oral PDE5 or other ED therapies  Has good continence post prostatectomy no longer wearing pads.   PSA checks with PCP, reported undetectable by patient     PD/Curvature none present     He has appropriate METS and good exercise tolerance reported.     PMH: [facility] (incarcerated), RALP, alcohol abuse in remission     1/21/24  Has gotten up to 20 mg multiple times, didn't work fullness not enough for penetration. Otherwise tolerated well.   No noted penile curvature.     Currently the patient has no fever, chills, nausea, vomiting or other signs/symptoms suggestive of acute systemic infection.    PMH  Past Medical History:   Diagnosis Date    Cancer (H)     Substance abuse (H)      PSH  Past Surgical History:   Procedure Laterality Date    GENITOURINARY SURGERY       FAMHx  Family History   Problem Relation Age of Onset    Bipolar Disorder Maternal Grandmother     Mental Illness Maternal Grandmother     Mental Illness Sister     Bipolar Disorder Sister     Mental Illness Brother     Bipolar Disorder Brother      ALLERGY  No Known Allergies  MEDICATIONS  has a current medication list which includes the following prescription(s): compounded non-controlled substance, amlodipine, and sertraline, and the following Facility-Administered Medications: Self Administer Medications: Behavioral Services.  ROS:  Constitutional: negative  Eyes: negative  Ears/Nose/Throat/Mouth: negative  Respiratory: negative  Cardiovascular: negative  Gastrointestinal: negative  Genito-Urinary: as documented above in HPI  Musculoskeletal: negative  Neurological:  "negative  Integumentary: negative      Objective:     BP (!) 165/75 (BP Location: Right arm, Patient Position: Sitting, Cuff Size: Adult Regular)   Pulse 81   Temp 97.9  F (36.6  C) (Tympanic)    General appearance: Healthy, in no apparent distress  Eyes: No conjunctival erythema or drainage.   Pulmonary: Unlabored breathing.  Abdomen: Prior scars from incisions noted: none  Skin: No rashes or ulcers visible  Neuro/Psych: Alert, oriented to person and place, appropriate affect  : Defer per request    LABORATORY:  No results found for: \"CREATININE\"  No results found for: \"PSA\"      Assessment:     Bert Aburto is a 64 year old male with a PMHx significant for those items listed, who is presenting to clinic today to discuss erectile dysfunction post prostatectomy.    Plan:      Combined arterial insufficiency and corporo-venous occlusive erectile dysfunction  Erectile dysfunction after radical prostatectomy    We discussed the pathophysiology of erectile dysfunction and the potential causes including psychogenic, neurogenic, vasculogenic, endocrine, and iatrogenic causes. We discussed the different treatment options including PDE 5 inhibitors, vacuum erection device, intraurethral suppository, intracavernosal injection therapy, and penile implants. We discussed the risks and benefits of each and potential side effects of treatment.   PDE 5 inhibitors: headaches, stuffy nose, visual disturbances, myalgias, heartburn, priapism. He was given instructions to take the medications 1-3 hrs prior to sexual activity and on an empty stomach  ALTHEA: pain, bruising, hematoma, numbness  Intraurethral suppository: pain, urethral burning, light headedness, priapism  Intracavernosal injection: pain, bruising, priapism, build up of scar tissue  IPP: pain, infection bleeding, injury to surrounding structures, possible revision surgery, possible anesthetic complications, possible device failure, possible erosion. Reviewed with patient " the important of life style modifications, and exercise to help with the treatment of ED. Discussed the need to follow up with the primary care physician to evaluate and treat cardiovascular disease risk factors.     After a thorough discussion of treatment options he would like to proceed with    - Medications not working for ED   - Would like [facility], plan for Trimix [facility] teaching, referral sent. No noted curvature, discussed if develops curvature to stop medication and alert urology. Will  med post teaching. Discussed in detail the risks, benefits, alternatives and precautions. He voiced understanding of the plan and agrees to proceed.    Prostate cancer s/p RALP  - PSA undetectable   - Continue annual PSA checks with PCP     ROV 3-6 month, or sooner if [facility] are not working for ED

## 2025-01-21 ENCOUNTER — OFFICE VISIT (OUTPATIENT)
Dept: UROLOGY | Facility: CLINIC | Age: 65
End: 2025-01-21
Payer: COMMERCIAL

## 2025-01-21 VITALS — DIASTOLIC BLOOD PRESSURE: 75 MMHG | HEART RATE: 81 BPM | SYSTOLIC BLOOD PRESSURE: 165 MMHG | TEMPERATURE: 97.9 F

## 2025-01-21 DIAGNOSIS — N52.03 COMBINED ARTERIAL INSUFFICIENCY AND CORPORO-VENOUS OCCLUSIVE ERECTILE DYSFUNCTION: Primary | ICD-10-CM

## 2025-01-21 DIAGNOSIS — N52.31 ERECTILE DYSFUNCTION AFTER RADICAL PROSTATECTOMY: ICD-10-CM

## 2025-01-21 PROCEDURE — 99214 OFFICE O/P EST MOD 30 MIN: CPT | Performed by: STUDENT IN AN ORGANIZED HEALTH CARE EDUCATION/TRAINING PROGRAM

## 2025-02-03 ENCOUNTER — MYC MEDICAL ADVICE (OUTPATIENT)
Dept: UROLOGY | Facility: CLINIC | Age: 65
End: 2025-02-03
Payer: COMMERCIAL

## 2025-02-12 NOTE — TELEPHONE ENCOUNTER
Left Voicemail (1st Attempt) for the patient to call back and schedule the following:    Appointment type: nurse only  Provider: Nurse  Return date: Wednesday or Thursday between 930 and 230  Specialty phone number: [phone] (direct)  Additional appointment(s) needed: n/a  Additonal Notes: pt needs to bring medication with for Teaching.

## 2025-02-20 ENCOUNTER — ALLIED HEALTH/NURSE VISIT (OUTPATIENT)
Dept: UROLOGY | Facility: CLINIC | Age: 65
End: 2025-02-20
Payer: COMMERCIAL

## 2025-02-20 DIAGNOSIS — N52.31 ERECTILE DYSFUNCTION AFTER RADICAL PROSTATECTOMY: Primary | ICD-10-CM

## 2025-02-20 NOTE — PROGRESS NOTES
"Erectile Dysfunction    Patient Active Problem List   Diagnosis    Alcohol withdrawal with complication with inpatient treatment, with unspecified complication (H)    Chemical dependency (H)    Generalized anxiety disorder       No Known Allergies    Current Outpatient Medications   Medication Sig Dispense Refill    amLODIPine (NORVASC) 5 MG tablet Take 1 tablet (5 mg) by mouth daily 30 tablet 0    COMPOUNDED NON-CONTROLLED SUBSTANCE (CMPD RX) - PHARMACY TO MIX COMPOUNDED MEDICATION \"[place] Trimix #8\" Trimix intercavernosal injection, per mL: PGE1 20 mcg Papaverine 27mg Phentolamine 1 mg May titrate in 0.1mL increments to lowest effective dose. Max use one daily, and 3x/week. 5 mL 99    sertraline (ZOLOFT) 25 MG tablet Take 1 tablet by mouth every morning         Social History     Tobacco Use    Smoking status: Every Day     Current packs/day: 1.00     Types: Cigarettes    Smokeless tobacco: Never   Substance Use Topics    Alcohol use: Yes     Comment: \"12 beers a day\"    Drug use: Never       Bert Aburto comes into clinic today at the request of Dr Zhang  for intracavernosal injection teaching.    Diagnosis: Erectile dysfunction    This service provided today was under the direct supervision of Dr Savage, who was available if needed.    Bert Aburto presents to clinic for Trimix injection teaching.  Reviewed usage and possible side effects.  Questions answered appropriately.  Patient injected 0.2 mcg without assistance.  After 20 minutes, patient reassessed.  Patient rates his erection a 7/10. Patient stated that it would be firm enough for penetration.  Patient educated on alternating sites for injection, left and right side of shaft. Also discussed not to use more than 3 times per week, at least 24 hours between each injection. Also discussed prolonged erections and need to go to ER if that happens.  Patient verbalized understanding. No further questions.  Patient to call if he has any questions or " concerns.    Patient self injected 0.2 mcg of his own Trimix that he brought into clinic.    The following medication was given:     MEDICATION:  Trimix  ROUTE:  intracavernosal  SITE: penile  DOSE: 0.2 mcg  LOT #: [id number]  : mixed by [facility]  EXPIRATION DATE: 3/19/25  NDC#: none   Was there drug waste? No  Multi-dose vial: Yes    Susannah Reyna RN  February 20, 2025     Prior to injection, verified patient identity using patient's name and date of birth.  Due to injection administration, patient instructed to remain in clinic for 15 minutes  afterwards, and to report any adverse reaction to me immediately.        Susannah Reyna RN  2/20/2025  9:54 AM

## 2025-02-21 ENCOUNTER — APPOINTMENT (OUTPATIENT)
Dept: PSYCHIATRY | Facility: CLINIC | Age: 65
End: 2025-02-21
Attending: PSYCHOLOGIST
Payer: COMMERCIAL

## 2025-02-21 DIAGNOSIS — F10.939 ALCOHOL WITHDRAWAL WITH COMPLICATION WITH INPATIENT TREATMENT, WITH UNSPECIFIED COMPLICATION (H): ICD-10-CM

## 2025-02-21 DIAGNOSIS — F33.0 MILD EPISODE OF RECURRENT MAJOR DEPRESSIVE DISORDER: ICD-10-CM

## 2025-02-21 DIAGNOSIS — F19.20 CHEMICAL DEPENDENCY (H): Primary | ICD-10-CM

## 2025-02-21 DIAGNOSIS — F41.1 GENERALIZED ANXIETY DISORDER: ICD-10-CM

## 2025-02-21 PROCEDURE — 90837 PSYTX W PT 60 MINUTES: CPT | Mod: 95 | Performed by: PSYCHOLOGIST
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
## 2025-03-14 ENCOUNTER — VIRTUAL VISIT (OUTPATIENT)
Dept: PSYCHIATRY | Facility: CLINIC | Age: 65
End: 2025-03-14
Attending: PSYCHOLOGIST
Payer: COMMERCIAL

## 2025-03-14 DIAGNOSIS — F41.1 GENERALIZED ANXIETY DISORDER: ICD-10-CM

## 2025-03-14 DIAGNOSIS — F19.20 CHEMICAL DEPENDENCY (H): Primary | ICD-10-CM
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
## 2025-03-31 ENCOUNTER — VIRTUAL VISIT (OUTPATIENT)
Dept: PSYCHIATRY | Facility: CLINIC | Age: 65
End: 2025-03-31
Attending: PSYCHOLOGIST
Payer: COMMERCIAL

## 2025-03-31 DIAGNOSIS — F19.20 CHEMICAL DEPENDENCY (H): Primary | ICD-10-CM

## 2025-03-31 DIAGNOSIS — F41.1 GENERALIZED ANXIETY DISORDER: ICD-10-CM

## 2025-03-31 DIAGNOSIS — F32.1 CURRENT MODERATE EPISODE OF MAJOR DEPRESSIVE DISORDER, UNSPECIFIED WHETHER RECURRENT (H): ICD-10-CM

## 2025-03-31 PROCEDURE — 90837 PSYTX W PT 60 MINUTES: CPT | Mod: 95 | Performed by: PSYCHOLOGIST
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
## 2025-04-11 ENCOUNTER — VIRTUAL VISIT (OUTPATIENT)
Dept: PSYCHIATRY | Facility: CLINIC | Age: 65
End: 2025-04-11
Attending: PSYCHOLOGIST
Payer: COMMERCIAL

## 2025-04-11 DIAGNOSIS — F10.21 ALCOHOLISM IN REMISSION (H): ICD-10-CM

## 2025-04-11 DIAGNOSIS — F41.1 GENERALIZED ANXIETY DISORDER: ICD-10-CM

## 2025-04-11 DIAGNOSIS — F33.0 MILD EPISODE OF RECURRENT MAJOR DEPRESSIVE DISORDER: Primary | ICD-10-CM

## 2025-04-17 PROBLEM — F33.0 MILD EPISODE OF RECURRENT MAJOR DEPRESSIVE DISORDER: Status: ACTIVE | Noted: 2025-04-17

## 2025-04-17 PROBLEM — F10.21 ALCOHOLISM IN REMISSION (H): Status: ACTIVE | Noted: 2025-04-17
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
## 2025-04-25 ENCOUNTER — APPOINTMENT (OUTPATIENT)
Dept: PSYCHIATRY | Facility: CLINIC | Age: 65
End: 2025-04-25
Attending: PSYCHOLOGIST
Payer: COMMERCIAL

## 2025-05-09 ENCOUNTER — APPOINTMENT (OUTPATIENT)
Dept: PSYCHIATRY | Facility: CLINIC | Age: 65
End: 2025-05-09
Attending: PSYCHOLOGIST
Payer: COMMERCIAL

## 2025-05-09 DIAGNOSIS — F33.0 MILD EPISODE OF RECURRENT MAJOR DEPRESSIVE DISORDER: Primary | ICD-10-CM

## 2025-05-09 DIAGNOSIS — F41.1 GENERALIZED ANXIETY DISORDER: ICD-10-CM

## 2025-05-09 DIAGNOSIS — F10.21 ALCOHOLISM IN REMISSION (H): ICD-10-CM

## 2025-05-09 PROCEDURE — 90837 PSYTX W PT 60 MINUTES: CPT | Mod: 95 | Performed by: PSYCHOLOGIST

## 2025-05-09 ASSESSMENT — PATIENT HEALTH QUESTIONNAIRE - PHQ9
10. IF YOU CHECKED OFF ANY PROBLEMS, HOW DIFFICULT HAVE THESE PROBLEMS MADE IT FOR YOU TO DO YOUR WORK, TAKE CARE OF THINGS AT HOME, OR GET ALONG WITH OTHER PEOPLE: SOMEWHAT DIFFICULT
SUM OF ALL RESPONSES TO PHQ QUESTIONS 1-9: 2
SUM OF ALL RESPONSES TO PHQ QUESTIONS 1-9: 2
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
## 2025-05-21 ENCOUNTER — APPOINTMENT (OUTPATIENT)
Dept: PSYCHIATRY | Facility: CLINIC | Age: 65
End: 2025-05-21
Attending: PSYCHOLOGIST
Payer: COMMERCIAL

## 2025-05-21 DIAGNOSIS — F19.20 CHEMICAL DEPENDENCY (H): ICD-10-CM

## 2025-05-21 DIAGNOSIS — F41.1 GENERALIZED ANXIETY DISORDER: ICD-10-CM

## 2025-05-21 DIAGNOSIS — F33.0 MILD EPISODE OF RECURRENT MAJOR DEPRESSIVE DISORDER: Primary | ICD-10-CM

## 2025-06-06 ENCOUNTER — APPOINTMENT (OUTPATIENT)
Dept: PSYCHIATRY | Facility: CLINIC | Age: 65
End: 2025-06-06
Attending: PSYCHOLOGIST
Payer: COMMERCIAL

## 2025-06-06 DIAGNOSIS — F41.1 GENERALIZED ANXIETY DISORDER: ICD-10-CM

## 2025-06-06 DIAGNOSIS — F19.20 CHEMICAL DEPENDENCY (H): ICD-10-CM

## 2025-06-06 DIAGNOSIS — F43.21 GRIEVING: ICD-10-CM

## 2025-06-06 DIAGNOSIS — F33.0 MILD EPISODE OF RECURRENT MAJOR DEPRESSIVE DISORDER: Primary | ICD-10-CM
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
## 2025-07-01 ENCOUNTER — VIRTUAL VISIT (OUTPATIENT)
Dept: PSYCHIATRY | Facility: CLINIC | Age: 65
End: 2025-07-01
Attending: PSYCHOLOGIST
Payer: COMMERCIAL

## 2025-07-01 DIAGNOSIS — F41.1 GENERALIZED ANXIETY DISORDER: Primary | ICD-10-CM

## 2025-07-01 DIAGNOSIS — F33.0 MILD EPISODE OF RECURRENT MAJOR DEPRESSIVE DISORDER: ICD-10-CM

## 2025-07-01 DIAGNOSIS — F43.21 GRIEVING: ICD-10-CM

## 2025-07-01 PROCEDURE — 90837 PSYTX W PT 60 MINUTES: CPT | Mod: 95 | Performed by: PSYCHOLOGIST
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
## 2025-07-25 ENCOUNTER — VIRTUAL VISIT (OUTPATIENT)
Dept: PSYCHIATRY | Facility: CLINIC | Age: 65
End: 2025-07-25
Attending: PSYCHOLOGIST
Payer: COMMERCIAL

## 2025-07-25 ASSESSMENT — PATIENT HEALTH QUESTIONNAIRE - PHQ9
SUM OF ALL RESPONSES TO PHQ QUESTIONS 1-9: 2
SUM OF ALL RESPONSES TO PHQ QUESTIONS 1-9: 2
10. IF YOU CHECKED OFF ANY PROBLEMS, HOW DIFFICULT HAVE THESE PROBLEMS MADE IT FOR YOU TO DO YOUR WORK, TAKE CARE OF THINGS AT HOME, OR GET ALONG WITH OTHER PEOPLE: NOT DIFFICULT AT ALL
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
## 2025-08-08 ENCOUNTER — APPOINTMENT (OUTPATIENT)
Dept: PSYCHIATRY | Facility: CLINIC | Age: 65
End: 2025-08-08
Attending: PSYCHOLOGIST
Payer: COMMERCIAL

## 2025-08-29 ENCOUNTER — VIRTUAL VISIT (OUTPATIENT)
Dept: PSYCHIATRY | Facility: CLINIC | Age: 65
End: 2025-08-29
Attending: PSYCHOLOGIST
Payer: COMMERCIAL

## 2025-08-29 DIAGNOSIS — F32.1 CURRENT MODERATE EPISODE OF MAJOR DEPRESSIVE DISORDER, UNSPECIFIED WHETHER RECURRENT (H): Primary | ICD-10-CM

## 2025-08-29 DIAGNOSIS — F33.0 MILD EPISODE OF RECURRENT MAJOR DEPRESSIVE DISORDER: ICD-10-CM

## 2025-08-29 DIAGNOSIS — F41.1 GENERALIZED ANXIETY DISORDER: ICD-10-CM

## 2025-08-29 PROCEDURE — 90837 PSYTX W PT 60 MINUTES: CPT | Mod: 95 | Performed by: PSYCHOLOGIST

## 2025-08-29 ASSESSMENT — PATIENT HEALTH QUESTIONNAIRE - PHQ9
SUM OF ALL RESPONSES TO PHQ QUESTIONS 1-9: 1
SUM OF ALL RESPONSES TO PHQ QUESTIONS 1-9: 1
10. IF YOU CHECKED OFF ANY PROBLEMS, HOW DIFFICULT HAVE THESE PROBLEMS MADE IT FOR YOU TO DO YOUR WORK, TAKE CARE OF THINGS AT HOME, OR GET ALONG WITH OTHER PEOPLE: NOT DIFFICULT AT ALL